# Patient Record
Sex: FEMALE | Race: BLACK OR AFRICAN AMERICAN | NOT HISPANIC OR LATINO | Employment: FULL TIME | URBAN - METROPOLITAN AREA
[De-identification: names, ages, dates, MRNs, and addresses within clinical notes are randomized per-mention and may not be internally consistent; named-entity substitution may affect disease eponyms.]

---

## 2017-01-03 ENCOUNTER — OFFICE VISIT (OUTPATIENT)
Dept: DERMATOLOGY | Facility: CLINIC | Age: 42
End: 2017-01-03
Payer: COMMERCIAL

## 2017-01-03 VITALS — BODY MASS INDEX: 25.34 KG/M2 | HEIGHT: 63 IN | WEIGHT: 143 LBS

## 2017-01-03 DIAGNOSIS — L82.1 SEBORRHEIC KERATOSES: ICD-10-CM

## 2017-01-03 DIAGNOSIS — L98.9 DISEASE OF SKIN AND SUBCUTANEOUS TISSUE: Primary | ICD-10-CM

## 2017-01-03 DIAGNOSIS — L91.8 SKIN TAGS, MULTIPLE ACQUIRED: ICD-10-CM

## 2017-01-03 DIAGNOSIS — L82.1 DERMATOSIS PAPULOSA NIGRA: ICD-10-CM

## 2017-01-03 PROCEDURE — 11100 PR BIOPSY OF SKIN LESION: CPT | Mod: S$GLB,,, | Performed by: DERMATOLOGY

## 2017-01-03 PROCEDURE — 99999 PR PBB SHADOW E&M-EST. PATIENT-LVL III: CPT | Mod: PBBFAC,,, | Performed by: DERMATOLOGY

## 2017-01-03 PROCEDURE — 88312 SPECIAL STAINS GROUP 1: CPT | Mod: 26,,, | Performed by: PATHOLOGY

## 2017-01-03 PROCEDURE — 88305 TISSUE EXAM BY PATHOLOGIST: CPT | Performed by: PATHOLOGY

## 2017-01-03 PROCEDURE — 1159F MED LIST DOCD IN RCRD: CPT | Mod: S$GLB,,, | Performed by: DERMATOLOGY

## 2017-01-03 PROCEDURE — 99202 OFFICE O/P NEW SF 15 MIN: CPT | Mod: 25,S$GLB,, | Performed by: DERMATOLOGY

## 2017-01-03 NOTE — PROGRESS NOTES
Subjective:       Patient ID:  Preeti Garvey is a 41 y.o. female who presents for   Chief Complaint   Patient presents with    Rash     Left breast, 1 month, redness, no tx     HPI Comments: Initial visit  Noticed red spot on inferior aspect of left breast one month ago, has been changing in size  Non pruritic, non bleeding, no other similar lesion  No h/o skin cancer         Current Outpatient Prescriptions: , non e new  Also OTC B complex vitamin supplement  Occasional aleve for HA    norgestimate-ethinyl estradiol (ORTHO TRI-CYCLEN LO) 0.18/0.215/0.25 mg-25 mcg (28) tablet, Take 1 tablet by mouth once daily., Disp: , Rfl:     cetirizine (ZYRTEC) 10 mg Cap, Take 1 capsule by mouth once daily., Disp: 30 capsule, Rfl: 11        Rash  - Initial  Affected locations: Left breast.  Duration: 1 month  Signs / symptoms: redness  Severity: mild  Timing: constant  Aggravated by: nothing  Relieving factors/Treatments tried: nothing        Review of Systems   Constitutional: Negative for fever and chills.   Skin: Positive for rash, daily sunscreen use (in makeup) and activity-related sunscreen use. Negative for itching.   Hematologic/Lymphatic: Does not bruise/bleed easily.        Objective:    Physical Exam   Constitutional: She appears well-developed and well-nourished. No distress.   Neurological: She is alert and oriented to person, place, and time. She is not disoriented.   Psychiatric: She has a normal mood and affect.   Skin:   Areas Examined (abnormalities noted in diagram):   Head / Face Inspection Performed  Neck Inspection Performed  Chest / Axilla Inspection Performed  Abdomen Inspection Performed                   Diagram Legend     Erythematous scaling macule/papule c/w actinic keratosis       Vascular papule c/w angioma      Pigmented verrucoid papule/plaque c/w seborrheic keratosis      Yellow umbilicated papule c/w sebaceous hyperplasia      Irregularly shaped tan macule c/w lentigo     1-2 mm  smooth white papules consistent with Milia      Movable subcutaneous cyst with punctum c/w epidermal inclusion cyst      Subcutaneous movable cyst c/w pilar cyst      Firm pink to brown papule c/w dermatofibroma      Pedunculated fleshy papule(s) c/w skin tag(s)      Evenly pigmented macule c/w junctional nevus     Mildly variegated pigmented, slightly irregular-bordered macule c/w mildly atypical nevus      Flesh colored to evenly pigmented papule c/w intradermal nevus       Pink pearly papule/plaque c/w basal cell carcinoma      Erythematous hyperkeratotic cursted plaque c/w SCC      Surgical scar with no sign of skin cancer recurrence      Open and closed comedones      Inflammatory papules and pustules      Verrucoid papule consistent consistent with wart     Erythematous eczematous patches and plaques     Dystrophic onycholytic nail with subungual debris c/w onychomycosis     Umbilicated papule    Erythematous-base heme-crusted tan verrucoid plaque consistent with inflamed seborrheic keratosis     Erythematous Silvery Scaling Plaque c/w Psoriasis     See annotation          Assessment / Plan:      Pathology Orders:      Normal Orders This Visit    Tissue Specimen To Pathology, Dermatology     Questions:    Directional Terms:  Other(comment)    Clinical information:  Isolated well circumscribed oval pink purple plaque with peripheral scale, non pruritic, EAC vs FDR vs other    Specific Site:  left breast        Disease of skin and subcutaneous tissue  -     Tissue Specimen To Pathology, Dermatology  Punch biopsy procedure note:  Punch biopsy performed after verbal consent obtained. Area marked and prepped with alcohol. Approximately 1cc of 1% lidocaine with epinephrine injected. 3 mm disposable punch used to remove lesion. Hemostasis obtained and biopsy site closed with 1 - 2 Prolene sutures. Wound care instructions reviewed with patient and handout given.    Seborrheic keratoses, Dermatosis papulosa  nigra  These are benign inherited growths without a malignant potential. Reassurance given to patient. No treatment is necessary.       Skin tags, multiple acquired  Reassurance given to patient. No treatment is necessary.   Treatment of benign, asymptomatic lesions may be considered cosmetic.    Patient instructed in importance in daily sun protection of at least spf 30. Sun avoidance and topical protection discussed.   Recommend Elta MD (physician office) COTZ sensitive (available online) for daily use on face and neck.  Patient encouraged to wear hat for all outdoor exposure.   Also discussed sun protective clothing.           Return in about 10 days (around 1/13/2017).

## 2017-01-03 NOTE — MR AVS SNAPSHOT
[facility]  [address]  [place] [address]  Phone: [phone]                  Preeti Gavrey   1/3/2017 10:45 AM   Office Visit    Description:  Female : 1975   Provider:  Jessica London MD   Department:  [facility]           Reason for Visit     Rash           Diagnoses this Visit        Comments    Disease of skin and subcutaneous tissue    -  Primary     Seborrheic keratoses         Dermatosis papulosa nigra         Skin tags, multiple acquired                To Do List           Future Appointments        Provider Department Dept Phone    2017 1:00 PM Jessica London MD [facility] [phone]      Goals (5 Years of Data)     None      Follow-Up and Disposition     Return in about 10 days (around 2017).      [facility]     [facility] Care [facility] - 24/ Assistance  Registered nurses in the [facility] On Call Center provide clinical advisement, health education, appointment booking, and other advisory services.  Call for this free service at [phone].             Medications           Message regarding Medications     Verify the changes and/or additions to your medication regime listed below are the same as discussed with your clinician today.  If any of these changes or additions are incorrect, please notify your healthcare provider.        STOP taking these medications     fluticasone (FLONASE) 50 mcg/actuation nasal spray 1 spray by Each Nare route once daily.           Verify that the below list of medications is an accurate representation of the medications you are currently taking.  If none reported, the list may be blank. If incorrect, please contact your healthcare provider. Carry this list with you in case of emergency.           Current Medications     norgestimate-ethinyl estradiol (ORTHO TRI-CYCLEN LO) 0.18/0.215/0.25 mg-25 mcg (28) tablet Take 1 tablet by mouth once daily.    cetirizine (ZYRTEC) 10 mg  "Cap Take 1 capsule by mouth once daily.           Clinical Reference Information           Vital Signs - Last Recorded  Most recent update: 1/3/2017 10:45 AM by Angelic Cantrell MA    Ht Wt LMP BMI       5' 3" (1.6 m) 64.9 kg (143 lb) 12/19/2016 25.33 kg/m2       Allergies as of 1/3/2017     Bactrim [Sulfamethoxazole-trimethoprim]      Immunizations Administered on Date of Encounter - 1/3/2017     None      Orders Placed During Today's Visit      Normal Orders This Visit    Tissue Specimen To Pathology, Dermatology       Instructions    Punch Biopsy Wound Care    Your doctor has performed a punch biopsy today.  A band aid and antibiotic ointment has been placed over the site.  This should remain in place for 24 hours.  It is recommended that you keep the area dry for the first 24 hours.  After 24 hours, you may remove the band aid and wash the area with warm soap and water and apply Vaseline jelly.  Many patients prefer to use Neosporin or Bacitracin ointment.  This is acceptable; however know that you can develop an allergy to this medication even if you have used it safely for years.  It is important to keep the area moist.  Letting it dry out and get air slows healing time, will worsen the scar, and make it more difficult to remove the stitches if they were placed.  Band aid is optional after first 24 hours.      If you notice increasing redness, tenderness, pain, or yellow drainage at the biopsy or surgical site, please notify your doctor.  These are signs of an infection.    If your biopsy/surgical site is bleeding, apply firm pressure for 15 minutes straight.  Repeat for another 15 minutes, if it is still bleeding.   If the surgical site continues to bleed, then please contact your doctor.     [facility]  [facility]  [address] [place] [address]  Dept: [phone]                "

## 2017-01-03 NOTE — PATIENT INSTRUCTIONS
Punch Biopsy Wound Care    Your doctor has performed a punch biopsy today.  A band aid and antibiotic ointment has been placed over the site.  This should remain in place for 24 hours.  It is recommended that you keep the area dry for the first 24 hours.  After 24 hours, you may remove the band aid and wash the area with warm soap and water and apply Vaseline jelly.  Many patients prefer to use Neosporin or Bacitracin ointment.  This is acceptable; however know that you can develop an allergy to this medication even if you have used it safely for years.  It is important to keep the area moist.  Letting it dry out and get air slows healing time, will worsen the scar, and make it more difficult to remove the stitches if they were placed.  Band aid is optional after first 24 hours.      If you notice increasing redness, tenderness, pain, or yellow drainage at the biopsy or surgical site, please notify your doctor.  These are signs of an infection.    If your biopsy/surgical site is bleeding, apply firm pressure for 15 minutes straight.  Repeat for another 15 minutes, if it is still bleeding.   If the surgical site continues to bleed, then please contact your doctor.     [facility]  [facility]  [address] [place] [address]  Dept: [phone]

## 2017-01-12 ENCOUNTER — TELEPHONE (OUTPATIENT)
Dept: DERMATOLOGY | Facility: CLINIC | Age: 42
End: 2017-01-12

## 2017-01-12 NOTE — TELEPHONE ENCOUNTER
----- Message from Maritza Garcia sent at 1/12/2017  1:39 PM CST -----  Call [phone]  Asking to reschedule Tuesday 01/17 appt to 01/16 th ...

## 2017-01-13 ENCOUNTER — OFFICE VISIT (OUTPATIENT)
Dept: DERMATOLOGY | Facility: CLINIC | Age: 42
End: 2017-01-13
Payer: COMMERCIAL

## 2017-01-13 VITALS — BODY MASS INDEX: 25.34 KG/M2 | HEIGHT: 63 IN | WEIGHT: 143 LBS

## 2017-01-13 DIAGNOSIS — T50.905D DRUG REACTION, SUBSEQUENT ENCOUNTER: ICD-10-CM

## 2017-01-13 DIAGNOSIS — T50.905A FIXED DRUG REACTION: Primary | ICD-10-CM

## 2017-01-13 PROCEDURE — 99999 PR PBB SHADOW E&M-EST. PATIENT-LVL II: CPT | Mod: PBBFAC,,, | Performed by: DERMATOLOGY

## 2017-01-13 PROCEDURE — 99213 OFFICE O/P EST LOW 20 MIN: CPT | Mod: S$GLB,,, | Performed by: DERMATOLOGY

## 2017-01-13 PROCEDURE — 1159F MED LIST DOCD IN RCRD: CPT | Mod: S$GLB,,, | Performed by: DERMATOLOGY

## 2017-01-13 RX ORDER — TRIAMCINOLONE ACETONIDE 1 MG/G
OINTMENT TOPICAL
Qty: 15 G | Refills: 1 | Status: SHIPPED | OUTPATIENT
Start: 2017-01-13 | End: 2017-03-24

## 2017-01-13 NOTE — PROGRESS NOTES
Subjective:       Patient ID:  Preeti Garvey is a 42 y.o. female who presents for   Chief Complaint   Patient presents with    Follow-up     BX results     HPI Comments: Patient last seen 01/03/2017 with isolated lesion left breast  Biopsy obtained    FINAL PATHOLOGIC DIAGNOSIS  1. Skin, left breast, punch biopsy:  - FEATURES MOST SUGGESTIVE OF A FIXED DRUG ERUPTION.  MICROSCOPIC DESCRIPTION: The biopsy shows compact orthokeratosis with focal parakeratosis. There is mild  epidermal acanthosis with elongation of rete ridges, mild spongiosis with exocytosis of lymphocytes and focal  vacuolar interface alteration along the dermoepidermal junction and the follicular epithelium with rare associated  apoptotic keratinocytes. There is an underlying superficial perivascular and perifollicular dermal lymphohistiocytic  infiltrate with scattered neutrophils and a few interstitial eosinophils. PAS stain is negative for fungi. Appropriately  reactive controls were reviewed. Multiple levels were examined.  Diagnosed by: Evy Caban M.D.  (Electronically Signed: 2017-01-09 12:34:28)      Review of Systems   Constitutional: Negative for fever and chills.   Skin: Negative for itching.        Objective:    Physical Exam   Constitutional: She appears well-developed and well-nourished. No distress.   Neurological: She is alert and oriented to person, place, and time. She is not disoriented.   Psychiatric: She has a normal mood and affect.   Skin:   Areas Examined (abnormalities noted in diagram):   Chest / Axilla Inspection Performed                   Diagram Legend     Erythematous scaling macule/papule c/w actinic keratosis       Vascular papule c/w angioma      Pigmented verrucoid papule/plaque c/w seborrheic keratosis      Yellow umbilicated papule c/w sebaceous hyperplasia      Irregularly shaped tan macule c/w lentigo     1-2 mm smooth white papules consistent with Milia      Movable subcutaneous cyst with punctum  c/w epidermal inclusion cyst      Subcutaneous movable cyst c/w pilar cyst      Firm pink to brown papule c/w dermatofibroma      Pedunculated fleshy papule(s) c/w skin tag(s)      Evenly pigmented macule c/w junctional nevus     Mildly variegated pigmented, slightly irregular-bordered macule c/w mildly atypical nevus      Flesh colored to evenly pigmented papule c/w intradermal nevus       Pink pearly papule/plaque c/w basal cell carcinoma      Erythematous hyperkeratotic cursted plaque c/w SCC      Surgical scar with no sign of skin cancer recurrence      Open and closed comedones      Inflammatory papules and pustules      Verrucoid papule consistent consistent with wart     Erythematous eczematous patches and plaques     Dystrophic onycholytic nail with subungual debris c/w onychomycosis     Umbilicated papule    Erythematous-base heme-crusted tan verrucoid plaque consistent with inflamed seborrheic keratosis     Erythematous Silvery Scaling Plaque c/w Psoriasis     See annotation      Assessment / Plan:        Fixed drug reaction  -     triamcinolone acetonide 0.1% (KENALOG) 0.1 % ointment; AAA bid  Dispense: 15 g; Refill: 1    Drug reaction, subsequent encounter    suspect aleve  Discussed possible culprits (NSAIDs, antibiotics, pseudoephedrine, fluconazole)  Medication avoidance  Topical steroid to attempt to speed resolution; asymptomatic           Return if symptoms worsen or fail to improve.

## 2017-01-23 ENCOUNTER — NURSE TRIAGE (OUTPATIENT)
Dept: ADMINISTRATIVE | Facility: CLINIC | Age: 42
End: 2017-01-23

## 2017-01-23 NOTE — TELEPHONE ENCOUNTER
Reason for Disposition   Severe eye pain    Protocols used:  EYE PAIN-A-OH    Preeti is calling to report she has pain behind the right eye that is unrelieved by OTC pain meds.  States has a pressure in the right side of her face and when she bends over feels she will fall over.  Advised ER.

## 2017-01-24 ENCOUNTER — TELEPHONE (OUTPATIENT)
Dept: FAMILY MEDICINE | Facility: CLINIC | Age: 42
End: 2017-01-24

## 2017-01-24 NOTE — TELEPHONE ENCOUNTER
----- Message from Elaina Galloway sent at 1/23/2017 12:06 PM CST -----  Contact: self: [phone]  Patient has an appointment scheduled tomorrow morning but her side of head, eye is hurting really bad at this point. She states she needs to be seen today. Please call with availability.

## 2017-03-22 ENCOUNTER — TELEPHONE (OUTPATIENT)
Dept: FAMILY MEDICINE | Facility: CLINIC | Age: 42
End: 2017-03-22

## 2017-03-22 NOTE — TELEPHONE ENCOUNTER
----- Message from Verito Chris sent at 3/21/2017  4:46 PM CDT -----  Contact: self  Patient called regarding a copy of immunizations records. Please fax # [phone] and contact before faxing or if possible to email : [email or web address]. Please contact [phone] (home)

## 2017-03-24 ENCOUNTER — OFFICE VISIT (OUTPATIENT)
Dept: DERMATOLOGY | Facility: CLINIC | Age: 42
End: 2017-03-24
Payer: COMMERCIAL

## 2017-03-24 VITALS — WEIGHT: 143 LBS | HEIGHT: 63 IN | BODY MASS INDEX: 25.34 KG/M2

## 2017-03-24 DIAGNOSIS — L27.1 DRUG ERUPTION, FIXED: ICD-10-CM

## 2017-03-24 DIAGNOSIS — B35.1 ONYCHOMYCOSIS: Primary | ICD-10-CM

## 2017-03-24 DIAGNOSIS — L91.8 SKIN TAGS, MULTIPLE ACQUIRED: ICD-10-CM

## 2017-03-24 PROCEDURE — 1160F RVW MEDS BY RX/DR IN RCRD: CPT | Mod: S$GLB,,, | Performed by: DERMATOLOGY

## 2017-03-24 PROCEDURE — 99213 OFFICE O/P EST LOW 20 MIN: CPT | Mod: 25,S$GLB,, | Performed by: DERMATOLOGY

## 2017-03-24 PROCEDURE — 11200 RMVL SKIN TAGS UP TO&INC 15: CPT | Mod: S$GLB,,, | Performed by: DERMATOLOGY

## 2017-03-24 PROCEDURE — 99999 PR PBB SHADOW E&M-EST. PATIENT-LVL II: CPT | Mod: PBBFAC,,, | Performed by: DERMATOLOGY

## 2017-03-24 RX ORDER — EFINACONAZOLE 100 MG/ML
SOLUTION TOPICAL
Qty: 8 ML | Refills: 3 | Status: SHIPPED | OUTPATIENT
Start: 2017-03-24 | End: 2017-07-27

## 2017-03-24 NOTE — PROGRESS NOTES
Subjective:       Patient ID:  Preeti Garvey is a 42 y.o. female who presents for   Chief Complaint   Patient presents with    Rash     R breast, L inner thigh    Skin Tags     neck, r side    Spot     L foot big toe     HPI Comments: Patient last seen 01/03/17 and 01/13/2017 with lesion clinically and dermpath c/w fixed drug reaction  Attributed to aleve, stopped  Now recall that aleve D was taken and that recent mucinex D taken  New spot on R inner thigh, similar to previous    aslo c/o bothersome tag on R neck, ithcy, cathces in jewelry  Lastly, c/o changes to Left big toenail, lifts nail, tender    Rash  - Initial  Affected locations: right upper leg (L breast)  Duration: 4 weeks  Signs / symptoms: redness and itching  Severity: mild to moderate  Timing: constant  Aggravated by: nothing  Treatments tried: OTC Aveno cream.  Improvement on treatment: mild    Skin Tags  - Initial  Affected locations: neck  Duration: 30 years  Signs / symptoms: irritated  Severity: mild to moderate  Timing: constant  Exacerbated by: necklace around neck.  Relieving factors/Treatments tried: nothing    Spot  - Initial  Affected locations: left foot (Big toenail)  Duration: 6 months  Signs and Symptoms: discoloration.  Severity: mild to moderate  Timing: constant  Aggravated by: nothing  Relieving factors/Treatments tried: nothing        Review of Systems   Constitutional: Negative for fever and chills.   Skin: Positive for rash. Negative for itching.        Objective:    Physical Exam   Constitutional: She appears well-developed and well-nourished. No distress.   Neurological: She is alert and oriented to person, place, and time. She is not disoriented.   Psychiatric: She has a normal mood and affect.   Skin:   Areas Examined (abnormalities noted in diagram):   Chest / Axilla Inspection Performed  Genitals / Buttocks / Groin Inspection Performed  RUE Inspected  LUE Inspection Performed                       Diagram Legend      Erythematous scaling macule/papule c/w actinic keratosis       Vascular papule c/w angioma      Pigmented verrucoid papule/plaque c/w seborrheic keratosis      Yellow umbilicated papule c/w sebaceous hyperplasia      Irregularly shaped tan macule c/w lentigo     1-2 mm smooth white papules consistent with Milia      Movable subcutaneous cyst with punctum c/w epidermal inclusion cyst      Subcutaneous movable cyst c/w pilar cyst      Firm pink to brown papule c/w dermatofibroma      Pedunculated fleshy papule(s) c/w skin tag(s)      Evenly pigmented macule c/w junctional nevus     Mildly variegated pigmented, slightly irregular-bordered macule c/w mildly atypical nevus      Flesh colored to evenly pigmented papule c/w intradermal nevus       Pink pearly papule/plaque c/w basal cell carcinoma      Erythematous hyperkeratotic cursted plaque c/w SCC      Surgical scar with no sign of skin cancer recurrence      Open and closed comedones      Inflammatory papules and pustules      Verrucoid papule consistent consistent with wart     Erythematous eczematous patches and plaques     Dystrophic onycholytic nail with subungual debris c/w onychomycosis     Umbilicated papule    Erythematous-base heme-crusted tan verrucoid plaque consistent with inflamed seborrheic keratosis     Erythematous Silvery Scaling Plaque c/w Psoriasis     See annotation      Assessment / Plan:        Fixed drug reaction  Avoid D part of medications (pseudoephedrin)    Onychomycosis, left big toe, tender nail, lifted by subungual debirs  -     JUBLIA 10 % Vianney; Apply to affected nail(s) qd  Dispense: 8 mL; Refill: 3    Skin tags, multiple acquired  On lesion R neck catches in clothing and jewelry, requests tx  Verbal consent obtained. 1 lesions removed with scissor snip removal after anesthesia with 1% lidocaine with epinephrine. Hemostasis achieved with aluminum chloride and hyfrecation. No complications.           Return if symptoms worsen or fail  to improve.

## 2017-03-24 NOTE — MR AVS SNAPSHOT
[facility]  [address] [place] [address]  Phone: [phone]                  Preeti Garvey   3/24/2017 8:15 AM   Office Visit    Description:  Female : 1975   Provider:  Jessica London MD   Department:  [facility]           Reason for Visit     Rash     Skin Tags     Spot           Diagnoses this Visit        Comments    Onychomycosis    -  Primary     Skin tags, multiple acquired         Drug eruption, fixed                To Do List           Goals (5 Years of Data)     None      Follow-Up and Disposition     Return if symptoms worsen or fail to improve.    Follow-up and Disposition History       These Medications        Disp Refills Start End    JUBLIA 10 % Vianney 8 mL 3 3/24/2017     Apply to affected nail(s) qd    Pharmacy: [id number]  [place], [place] - [address] [facility] [address] Ph #: [phone]         [facility] On Call     [facility] On Call Nurse [facility] -  Assistance  Registered nurses in the [facility] On Call Center provide clinical advisement, health education, appointment booking, and other advisory services.  Call for this free service at [phone].             Medications           Message regarding Medications     Verify the changes and/or additions to your medication regime listed below are the same as discussed with your clinician today.  If any of these changes or additions are incorrect, please notify your healthcare provider.        START taking these NEW medications        Refills    JUBLIA 10 % Vianney 3    Sig: Apply to affected nail(s) qd    Class: Normal      STOP taking these medications     triamcinolone acetonide 0.1% (KENALOG) 0.1 % ointment AAA bid           Verify that the below list of medications is an accurate representation of the medications you are currently taking.  If none reported, the list may be blank. If incorrect, please contact your healthcare provider. Carry this list with  "you in case of emergency.           Current Medications     cetirizine (ZYRTEC) 10 mg Cap Take 1 capsule by mouth once daily.    JUBLIA 10 % Vianney Apply to affected nail(s) qd    norgestimate-ethinyl estradiol (ORTHO TRI-CYCLEN LO) 0.18/0.215/0.25 mg-25 mcg (28) tablet Take 1 tablet by mouth once daily.           Clinical Reference Information           Your Vitals Were     Height Weight BMI          5' 3" (1.6 m) 64.9 kg (143 lb) 25.33 kg/m2        Allergies as of 3/24/2017     Bactrim [Sulfamethoxazole-trimethoprim]      Immunizations Administered on Date of Encounter - 3/24/2017     None      Language Assistance Services     ATTENTION: Language assistance services are available, free of charge. Please call [phone].      ATENCIÓN: Si felicitas caicedo, tiene a zaldivar disposición servicios gratuitos de asistencia lingüística. Llame al [phone].     CHÚ Ý: N?u b?n nói Ti?ng Vi?t, có các d?ch v? h? tr? ngôn ng? mi?n phí dành cho b?n. G?i s? [phone].         [facility] complies with applicable Federal civil rights laws and does not discriminate on the basis of race, color, national origin, age, disability, or sex.        "

## 2017-05-01 ENCOUNTER — TELEPHONE (OUTPATIENT)
Dept: DERMATOLOGY | Facility: CLINIC | Age: 42
End: 2017-05-01

## 2017-05-01 NOTE — TELEPHONE ENCOUNTER
Spoke to pt. Informed we didn't have anything available today. Offered appt tomorrow. Pt states she will be out of town. Advised pt to call back if it hasn't cleared once she comes back. Verbalized understanding.

## 2017-05-01 NOTE — TELEPHONE ENCOUNTER
----- Message from Tonja Ivey sent at 5/1/2017 11:51 AM CDT -----  Contact: Patient  Patient called advising that she has a rash on her right foot that is rising up her leg.  I advised the first available appointment is in June with Dr. London.  Patient advised she wishes to be seen today.  Patient advised that she does not want to see a Family Medicine doctor as well.  Please call patient back at [phone] to discuss.  Thank you!

## 2017-05-05 ENCOUNTER — TELEPHONE (OUTPATIENT)
Dept: DERMATOLOGY | Facility: CLINIC | Age: 42
End: 2017-05-05

## 2017-05-05 NOTE — TELEPHONE ENCOUNTER
----- Message from Lissy Arenas sent at 5/5/2017  8:09 AM CDT -----  Contact: patient  Patient calling in regards to scheduling a same day appt today. She has a rash on her right foot that is spreading up leg. Please advise.   Call back .  Thanks!

## 2017-05-08 ENCOUNTER — OFFICE VISIT (OUTPATIENT)
Dept: DERMATOLOGY | Facility: CLINIC | Age: 42
End: 2017-05-08
Payer: COMMERCIAL

## 2017-05-08 VITALS — BODY MASS INDEX: 25.34 KG/M2 | WEIGHT: 143 LBS | HEIGHT: 63 IN

## 2017-05-08 DIAGNOSIS — L27.1 DRUG ERUPTION, FIXED: Primary | ICD-10-CM

## 2017-05-08 DIAGNOSIS — L98.9 DISEASE OF SKIN AND SUBCUTANEOUS TISSUE: ICD-10-CM

## 2017-05-08 PROCEDURE — 99214 OFFICE O/P EST MOD 30 MIN: CPT | Mod: S$GLB,,, | Performed by: DERMATOLOGY

## 2017-05-08 PROCEDURE — 99999 PR PBB SHADOW E&M-EST. PATIENT-LVL III: CPT | Mod: PBBFAC,,, | Performed by: DERMATOLOGY

## 2017-05-08 PROCEDURE — 1160F RVW MEDS BY RX/DR IN RCRD: CPT | Mod: S$GLB,,, | Performed by: DERMATOLOGY

## 2017-05-08 NOTE — PROGRESS NOTES
Subjective:       Patient ID:  Preeti Garvey is a 42 y.o. female who presents for   Chief Complaint   Patient presents with    Rash     R arm, R leg, abdomen    Spot     boht underarms    Follow-up     drug reaction inner thigh     HPI Comments: Patient last seen 3/2017 with fixed drug reaction, presumed to pseudoephedrine  New spots on axillae and under R breast    C/o new rash, since Easter, started on R dorsal foot, now spreading up leg and R inner arm, itchy      No episodic medication (NSAIDs, no decongestant, no OTC supplement except briefly protein powder, has since stopped taking)  Current Outpatient Prescriptions:     JUBLIA 10 % Vianney, Apply to affected nail(s) qd, Disp: 8 mL, Rfl: 3    norgestimate-ethinyl estradiol (ORTHO TRI-CYCLEN LO) 0.18/0.215/0.25 mg-25 mcg (28) tablet, Take 1 tablet by mouth once daily., Disp: , Rfl:     cetirizine (ZYRTEC) 10 mg Cap, Take 1 capsule by mouth once daily., Disp: 30 capsule, Rfl: 11- TAKES PRN for seasonal allergies, not in recent months      Rash  - Initial  Affected locations: abdomen, right lower leg and right arm  Duration: 1 month  Signs / symptoms: spreading and itching  Severity: mild to moderate  Timing: constant  Aggravated by: nothing  Treatments tried: OTC aveeno.  Improvement on treatment: no relief    Spot  - Initial  Affected locations: right axilla and left axilla  Duration: 2 weeks  Signs / symptoms: redness  Severity: mild  Timing: constant  Aggravated by: nothing  Relieving factors/Treatments tried: nothing        Review of Systems   Constitutional: Negative for fever, chills, weight loss, weight gain, fatigue, night sweats and malaise.   Skin: Positive for itching, rash and wears hat.   Hematologic/Lymphatic: Does not bruise/bleed easily.        Objective:    Physical Exam   Constitutional: She appears well-developed and well-nourished. No distress.   Neurological: She is alert and oriented to person, place, and time. She is not  disoriented.   Psychiatric: She has a normal mood and affect.   Skin:   Areas Examined (abnormalities noted in diagram):   Chest / Axilla Inspection Performed  Genitals / Buttocks / Groin Inspection Performed  RUE Inspected  LUE Inspection Performed                       Diagram Legend     Erythematous scaling macule/papule c/w actinic keratosis       Vascular papule c/w angioma      Pigmented verrucoid papule/plaque c/w seborrheic keratosis      Yellow umbilicated papule c/w sebaceous hyperplasia      Irregularly shaped tan macule c/w lentigo     1-2 mm smooth white papules consistent with Milia      Movable subcutaneous cyst with punctum c/w epidermal inclusion cyst      Subcutaneous movable cyst c/w pilar cyst      Firm pink to brown papule c/w dermatofibroma      Pedunculated fleshy papule(s) c/w skin tag(s)      Evenly pigmented macule c/w junctional nevus     Mildly variegated pigmented, slightly irregular-bordered macule c/w mildly atypical nevus      Flesh colored to evenly pigmented papule c/w intradermal nevus       Pink pearly papule/plaque c/w basal cell carcinoma      Erythematous hyperkeratotic cursted plaque c/w SCC      Surgical scar with no sign of skin cancer recurrence      Open and closed comedones      Inflammatory papules and pustules      Verrucoid papule consistent consistent with wart     Erythematous eczematous patches and plaques     Dystrophic onycholytic nail with subungual debris c/w onychomycosis     Umbilicated papule    Erythematous-base heme-crusted tan verrucoid plaque consistent with inflamed seborrheic keratosis     Erythematous Silvery Scaling Plaque c/w Psoriasis     See annotation                          Assessment / Plan:        Drug eruption, fixed  New lesions axillae, inframammary folds, R inguinal fold  Had suspected pseudoephedrine, now presists recur off med x months  OCP next likely culprit  Plan to see GYN this week, discuss non OCP options (copper IUD vs  other)    Disease of skin and subcutaneous tissue, LP like, abdomen, lower leg, forearm  Declines bx at this time  Will try TAC ointment BID  Hold new [employer] protein supplement    Patient instructed in importance in daily sun protection of at least spf 30. Sun avoidance and topical protection discussed.   Recommend Elta MD (physician office) COTZ sensitive (available online) for daily use on face and neck.  Patient encouraged to wear hat for all outdoor exposure.   Also discussed sun protective clothing.             No Follow-up on file.

## 2017-06-27 ENCOUNTER — TELEPHONE (OUTPATIENT)
Dept: DERMATOLOGY | Facility: CLINIC | Age: 42
End: 2017-06-27

## 2017-06-27 NOTE — TELEPHONE ENCOUNTER
----- Message from Wendi Segundo sent at 6/26/2017  2:50 PM CDT -----  Contact: self  Patient needs first available appointment due to rash between her leg spreading. Please call patient at [phone]. Thanks!

## 2017-06-27 NOTE — TELEPHONE ENCOUNTER
Called and offered patient apt for today patient denied stated she was leaving for out of town will call back Friday to see if there are any cancelations.

## 2017-07-03 ENCOUNTER — TELEPHONE (OUTPATIENT)
Dept: DERMATOLOGY | Facility: CLINIC | Age: 42
End: 2017-07-03

## 2017-07-03 NOTE — TELEPHONE ENCOUNTER
----- Message from Pablo Miller sent at 7/3/2017  8:22 AM CDT -----  Contact: pt  Pt is calling to see if she can be seen before 9-1-17(rash on leg stomach and arm)  Call Back#[phone]  Thanks

## 2017-07-27 ENCOUNTER — OFFICE VISIT (OUTPATIENT)
Dept: DERMATOLOGY | Facility: CLINIC | Age: 42
End: 2017-07-27
Payer: COMMERCIAL

## 2017-07-27 VITALS — BODY MASS INDEX: 25.34 KG/M2 | WEIGHT: 143 LBS | HEIGHT: 63 IN

## 2017-07-27 DIAGNOSIS — D48.9 NEOPLASM OF UNCERTAIN BEHAVIOR: Primary | ICD-10-CM

## 2017-07-27 DIAGNOSIS — L98.9 DISEASE OF SKIN AND SUBCUTANEOUS TISSUE: ICD-10-CM

## 2017-07-27 PROCEDURE — 11100 PR BIOPSY OF SKIN LESION: CPT | Mod: S$GLB,,, | Performed by: DERMATOLOGY

## 2017-07-27 PROCEDURE — 88305 TISSUE EXAM BY PATHOLOGIST: CPT | Performed by: PATHOLOGY

## 2017-07-27 PROCEDURE — 99213 OFFICE O/P EST LOW 20 MIN: CPT | Mod: 25,S$GLB,, | Performed by: DERMATOLOGY

## 2017-07-27 PROCEDURE — 99999 PR PBB SHADOW E&M-EST. PATIENT-LVL III: CPT | Mod: PBBFAC,,, | Performed by: DERMATOLOGY

## 2017-07-27 RX ORDER — PREDNISONE 20 MG/1
TABLET ORAL
Qty: 25 TABLET | Refills: 0 | Status: SHIPPED | OUTPATIENT
Start: 2017-07-27 | End: 2017-09-01 | Stop reason: SDUPTHER

## 2017-07-27 NOTE — PROGRESS NOTES
"  Subjective:       Patient ID:  Preeti Garvey is a 42 y.o. 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, culprit not identified,  Returns with new complaint of erutpion on R inner arm, R leg and abdomen, non itchy, "spreading"  attirbutes to eating nuts  Also co/o changes to eyelids, attributes to eyeliner      Spot  - Initial  Affected locations: abdomen, right arm, right upper leg and left upper leg  Duration: 3 months  Signs / symptoms: spreading and redness (bumpy)  Severity: mild to moderate  Timing: constant  Aggravated by: nothing  Relieving factors/Treatments tried: nothing    Rash  - Initial  Affected locations: face  Duration: 2 months  Signs / symptoms: redness  Severity: mild  Timing: constant  Aggravated by: nothing  Treatments tried: elidel, triamcinolone ointment.      Current Outpatient Prescriptions:     norgestimate-ethinyl estradiol (ORTHO TRI-CYCLEN LO) 0.18/0.215/0.25 mg-25 mcg (28) tablet, Take 1 tablet by mouth once daily., Disp: , Rfl:     cetirizine (ZYRTEC) 10 mg Cap, Take 1 capsule by mouth once daily., Disp: 30 capsule, Rfl: 11          Spot     Rash         Review of Systems   Skin: Positive for rash.        Objective:    Physical Exam   Constitutional: She appears well-developed and well-nourished. No distress.   Neurological: She is alert and oriented to person, place, and time. She is not disoriented.   Psychiatric: She has a normal mood and affect.   Skin:   Areas Examined (abnormalities noted in diagram):   Head / Face Inspection Performed  Chest / Axilla Inspection Performed  Abdomen Inspection Performed  RUE Inspected  LUE Inspection Performed  RLE Inspected  LLE Inspection Performed                   Diagram Legend     Erythematous scaling macule/papule c/w actinic keratosis       Vascular papule c/w angioma      Pigmented verrucoid papule/plaque " c/w seborrheic keratosis      Yellow umbilicated papule c/w sebaceous hyperplasia      Irregularly shaped tan macule c/w lentigo     1-2 mm smooth white papules consistent with Milia      Movable subcutaneous cyst with punctum c/w epidermal inclusion cyst      Subcutaneous movable cyst c/w pilar cyst      Firm pink to brown papule c/w dermatofibroma      Pedunculated fleshy papule(s) c/w skin tag(s)      Evenly pigmented macule c/w junctional nevus     Mildly variegated pigmented, slightly irregular-bordered macule c/w mildly atypical nevus      Flesh colored to evenly pigmented papule c/w intradermal nevus       Pink pearly papule/plaque c/w basal cell carcinoma      Erythematous hyperkeratotic cursted plaque c/w SCC      Surgical scar with no sign of skin cancer recurrence      Open and closed comedones      Inflammatory papules and pustules      Verrucoid papule consistent consistent with wart     Erythematous eczematous patches and plaques     Dystrophic onycholytic nail with subungual debris c/w onychomycosis     Umbilicated papule    Erythematous-base heme-crusted tan verrucoid plaque consistent with inflamed seborrheic keratosis     Erythematous Silvery Scaling Plaque c/w Psoriasis     See annotation                  Assessment / Plan:      Pathology Orders:      Normal Orders This Visit    Tissue Specimen To Pathology, Dermatology     Questions:    Directional Terms:  Other(comment)    Clinical information:  Scattered lichenoid papules, some scaling, R dorsal foot and ankle, R inner forearm, abdomen, lichen planus vs lichenoid drug vs spong    Specific Site:  R dorsal foot        -     Tissue Specimen To Pathology, Dermatology    Disease of skin and subcutaneous tissue  -     predniSONE (DELTASONE) 20 MG tablet; 2 tabs PO QAM x 7 days, then 1 tab PO QAM x 7 days then 1/2 tab PO QAM x 4 days  Dispense: 25 tablet; Refill: 0  Favor LP, although not pruritic and more scaly     Patient will avoid nuts as she  feels this is a trigger         No Follow-up on file.   FINAL PATHOLOGIC DIAGNOSIS  1. Skin, right dorsal foot, shave biopsy:  - SPONGIOTIC AND LICHENOID DERMATITIS (SEE COMMENT).  MICROSCOPIC DESCRIPTION: The biopsy shows parakeratosis that is focally associated with neutrophils. The  epidermis is remarkable for prominent exocytosis of lymphocytes, mild spongiosis and vacuolar-interface changes  with dyskeratotic cells located at various levels throughout the epidermis. There is a superficial lichenoid and  perivascular lymphohistiocytic infiltrate with scattered eosinophils.  COMMENT: These histologic features are most suggestive of a lichenoid drug eruption or a photo drug eruption.  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 Clinical correlation is advised.  Diagnosed by: Evy Caban M.D.  (Electronically Signed: 2017-08-01 18:06:31)

## 2017-07-27 NOTE — PROGRESS NOTES
Subjective:       Patient ID:  Preeti Garvey is a 42 y.o. female who presents for   Chief Complaint   Patient presents with    Spot     R arm, abdomen, R leg    Rash     face     Spot  - Initial  Affected locations: abdomen, right arm, right upper leg and left upper leg  Duration: 3 months  Signs / symptoms: spreading and redness (bumpy)  Severity: mild to moderate  Timing: constant  Aggravated by: nothing  Relieving factors/Treatments tried: nothing    Rash  - Initial  Affected locations: face  Duration: 2 months  Signs / symptoms: redness  Severity: mild  Timing: constant  Aggravated by: nothing  Treatments tried: elidel, triamcinolone ointment.        Review of Systems   Constitutional: Negative for fever, chills, weight loss and night sweats.   Skin: Positive for itching and rash.   Hematologic/Lymphatic: Does not bruise/bleed easily.        Objective:    Physical Exam       Diagram Legend     Erythematous scaling macule/papule c/w actinic keratosis       Vascular papule c/w angioma      Pigmented verrucoid papule/plaque c/w seborrheic keratosis      Yellow umbilicated papule c/w sebaceous hyperplasia      Irregularly shaped tan macule c/w lentigo     1-2 mm smooth white papules consistent with Milia      Movable subcutaneous cyst with punctum c/w epidermal inclusion cyst      Subcutaneous movable cyst c/w pilar cyst      Firm pink to brown papule c/w dermatofibroma      Pedunculated fleshy papule(s) c/w skin tag(s)      Evenly pigmented macule c/w junctional nevus     Mildly variegated pigmented, slightly irregular-bordered macule c/w mildly atypical nevus      Flesh colored to evenly pigmented papule c/w intradermal nevus       Pink pearly papule/plaque c/w basal cell carcinoma      Erythematous hyperkeratotic cursted plaque c/w SCC      Surgical scar with no sign of skin cancer recurrence      Open and closed comedones      Inflammatory papules and pustules      Verrucoid papule consistent consistent  with wart     Erythematous eczematous patches and plaques     Dystrophic onycholytic nail with subungual debris c/w onychomycosis     Umbilicated papule    Erythematous-base heme-crusted tan verrucoid plaque consistent with inflamed seborrheic keratosis     Erythematous Silvery Scaling Plaque c/w Psoriasis     See annotation      Assessment / Plan:        There are no diagnoses linked to this encounter.         No Follow-up on file.

## 2017-07-31 NOTE — PATIENT INSTRUCTIONS
Shave Biopsy Wound Care    Your doctor has performed a shave biopsy today.  A band aid and vaseline ointment has been placed over the site.  This should remain in place for 24 hours.  It is recommended that you keep the area dry for the first 24 hours.  After 24 hours, you may remove the band aid and wash the area with warm soap and water and apply Vaseline jelly.  Many patients prefer to use Neosporin or Bacitracin ointment.  This is acceptable; however, know that you can develop an allergy to this medication even if you have used it safely for years.  It is important to keep the area moist.  Letting it dry out and get air slows healing time, and will worsen the scar.  Band aid is optional after first 24 hours.      If you notice increasing redness, tenderness, pain, or yellow drainage at the biopsy site, please notify your doctor.  These are signs of an infection.    If your biopsy site is bleeding, apply firm pressure for 15 minutes straight.  Repeat for another 15 minutes, if it is still bleeding.   If the surgical site continues to bleed, then please contact your doctor.       [facility]  [facility]  [address] [place] [address]  Dept: [phone]

## 2017-08-01 ENCOUNTER — TELEPHONE (OUTPATIENT)
Dept: DERMATOLOGY | Facility: CLINIC | Age: 42
End: 2017-08-01

## 2017-08-01 NOTE — TELEPHONE ENCOUNTER
Spoke with patient advised results not in yet, will contact when it is resulted. Patient verbally understood

## 2017-08-01 NOTE — TELEPHONE ENCOUNTER
----- Message from Corin Vu sent at 8/1/2017  9:17 AM CDT -----  Contact: self  Patient [phone] is calling for her biopsy results from last week/please call

## 2017-08-15 ENCOUNTER — OFFICE VISIT (OUTPATIENT)
Dept: DERMATOLOGY | Facility: CLINIC | Age: 42
End: 2017-08-15
Payer: COMMERCIAL

## 2017-08-15 VITALS — BODY MASS INDEX: 25.34 KG/M2 | HEIGHT: 63 IN | WEIGHT: 143 LBS

## 2017-08-15 DIAGNOSIS — L43.2 LICHENOID DRUG REACTION: Primary | ICD-10-CM

## 2017-08-15 PROCEDURE — 99999 PR PBB SHADOW E&M-EST. PATIENT-LVL III: CPT | Mod: PBBFAC,,, | Performed by: DERMATOLOGY

## 2017-08-15 PROCEDURE — 99213 OFFICE O/P EST LOW 20 MIN: CPT | Mod: S$GLB,,, | Performed by: DERMATOLOGY

## 2017-08-15 PROCEDURE — 3008F BODY MASS INDEX DOCD: CPT | Mod: S$GLB,,, | Performed by: DERMATOLOGY

## 2017-08-15 NOTE — PROGRESS NOTES
Subjective:       Patient ID:  Preeti Garvey is a 42 y.o. 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 Skin, right dorsal foot, shave biopsy:  - SPONGIOTIC AND LICHENOID DERMATITIS (SEE COMMENT).  MICROSCOPIC DESCRIPTION: The biopsy shows parakeratosis that is focally associated with neutrophils. The  epidermis is remarkable for prominent exocytosis of lymphocytes, mild spongiosis and vacuolar-interface changes  with dyskeratotic cells located at various levels throughout the epidermis. There is a superficial lichenoid and  perivascular lymphohistiocytic infiltrate with scattered eosinophils.  COMMENT: These histologic features are most suggestive of a lichenoid drug eruption or a photo drug eruption.  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 Clinical correlation is advised.  Diagnosed by: Evy Caban M.D.  (Electronically Signed: 2017-08-01 18:06:31)      Current Outpatient Prescriptions:     norgestimate-ethinyl estradiol (ORTHO TRI-CYCLEN LO) 0.18/0.215/0.25 mg-25 mcg (28) tablet, Take 1 tablet by mouth once daily., Disp: , Rfl:     predniSONE (DELTASONE) 20 MG tablet, 2 tabs PO QAM x 7 days, then 1 tab PO QAM x 7 days then 1/2 tab PO QAM x 4 days, Disp: 25 tablet, Rfl: 0    cetirizine (ZYRTEC) 10 mg Cap, Take 1 capsule by mouth once daily., Disp: 30 capsule, Rfl: 11          Review of Systems   Constitutional: Negative for fever, chills, weight loss and night sweats.   Skin: Positive for itching (lower R leg,  occasionally), rash and dry skin.   Hematologic/Lymphatic: Does not bruise/bleed easily.        Objective:    Physical Exam   Constitutional: She appears well-developed and well-nourished. No distress.   Neurological: She is alert and oriented to person, place, and time. She is not disoriented.   Psychiatric: She has a normal mood and affect.   Skin:   Areas Examined (abnormalities noted in diagram):   Head / Face Inspection Performed  Chest / Axilla Inspection Performed  Abdomen Inspection Performed  RUE Inspected  LUE Inspection Performed  RLE Inspected  LLE Inspection Performed                   Diagram Legend     Erythematous scaling macule/papule c/w actinic keratosis       Vascular papule c/w angioma      Pigmented verrucoid papule/plaque c/w seborrheic keratosis      Yellow umbilicated papule c/w sebaceous hyperplasia      Irregularly shaped tan macule c/w lentigo     1-2 mm smooth white papules consistent with Milia      Movable subcutaneous cyst with punctum c/w epidermal inclusion cyst      Subcutaneous movable cyst c/w pilar cyst      Firm pink to brown papule c/w dermatofibroma      Pedunculated fleshy papule(s) c/w skin tag(s)      Evenly pigmented macule c/w junctional nevus     Mildly variegated pigmented, slightly irregular-bordered macule c/w mildly atypical nevus      Flesh colored to evenly pigmented papule c/w intradermal nevus       Pink pearly papule/plaque c/w basal cell carcinoma      Erythematous hyperkeratotic cursted plaque c/w SCC      Surgical scar with no sign of skin cancer recurrence      Open and closed comedones      Inflammatory papules and pustules      Verrucoid papule consistent consistent with wart     Erythematous eczematous patches and plaques     Dystrophic onycholytic nail with subungual debris c/w onychomycosis     Umbilicated papule    Erythematous-base heme-crusted tan verrucoid plaque consistent with inflamed seborrheic keratosis     Erythematous Silvery Scaling Plaque  c/w Psoriasis     See annotation      Assessment / Plan:        Lichenoid drug reaction, h/o FDR  only PO med is OCP, needed for menorrhagia  Improved on PO prednisone, completing taper now  May continue topical steroids  Discuss alternative contraception method/ menorrhagia management (tranexamic acid?)           Return if symptoms worsen or fail to improve.

## 2017-08-29 DIAGNOSIS — L43.2 LICHENOID DRUG REACTION: Primary | ICD-10-CM

## 2017-08-29 RX ORDER — TRIAMCINOLONE ACETONIDE 1 MG/G
OINTMENT TOPICAL 2 TIMES DAILY
Qty: 15 G | Refills: 2 | Status: SHIPPED | OUTPATIENT
Start: 2017-08-29 | End: 2017-09-07 | Stop reason: SDUPTHER

## 2017-08-29 NOTE — TELEPHONE ENCOUNTER
Seen 8/15/2017  Requesting refill on TAC    Lichenoid drug reaction, h/o FDR  only PO med is OCP, needed for menorrhagia  Improved on PO prednisone, completing taper now  May continue topical steroids  Discuss alternative contraception method/ menorrhagia management (tranexamic acid?)

## 2017-09-01 ENCOUNTER — TELEPHONE (OUTPATIENT)
Dept: DERMATOLOGY | Facility: CLINIC | Age: 42
End: 2017-09-01

## 2017-09-01 DIAGNOSIS — L43.2 LICHENOID DRUG REACTION: Primary | ICD-10-CM

## 2017-09-01 DIAGNOSIS — L98.9 DISEASE OF SKIN AND SUBCUTANEOUS TISSUE: ICD-10-CM

## 2017-09-01 RX ORDER — PREDNISONE 20 MG/1
TABLET ORAL
Qty: 25 TABLET | Refills: 0 | Status: SHIPPED | OUTPATIENT
Start: 2017-09-01 | End: 2017-09-22

## 2017-09-01 NOTE — TELEPHONE ENCOUNTER
Patient now off OCP x few days  falred badly upon steroid taper discontinuation  Will allow additional PO steroids  If linked to OCP, should improve with time away from last dose. Lichen planus and pityriasis lichenoides also in clinical and histopath ddx  Other therapeutic consideration include plaquenil, metronidazole, tetracyclines

## 2017-09-01 NOTE — TELEPHONE ENCOUNTER
discussed need to take daily Vit D3 (1000IU) and calcium (1g)  Patient with no h/o gastric ulcers or GERD

## 2017-09-01 NOTE — TELEPHONE ENCOUNTER
Last seen 8/15/17  Biopsy supports SPONGIOTIC AND LICHENOID DERMATITIS   Currently spot treating with TAC  Pt requesting another round of pred. Please advise...

## 2017-09-01 NOTE — TELEPHONE ENCOUNTER
----- Message from Pepper Ratliff sent at 9/1/2017 10:29 AM CDT -----  Contact: patient  Patient called wanted to know if she can get back on the oral steroid that she had taken in the past?please call back at [phone] to advise. Thanks,

## 2017-09-07 DIAGNOSIS — L43.2 LICHENOID DRUG REACTION: ICD-10-CM

## 2017-09-07 RX ORDER — TRIAMCINOLONE ACETONIDE 1 MG/G
OINTMENT TOPICAL
Qty: 15 G | Refills: 0 | Status: SHIPPED | OUTPATIENT
Start: 2017-09-07 | End: 2019-01-30

## 2017-09-22 ENCOUNTER — OFFICE VISIT (OUTPATIENT)
Dept: DERMATOLOGY | Facility: CLINIC | Age: 42
End: 2017-09-22
Payer: COMMERCIAL

## 2017-09-22 VITALS — WEIGHT: 143 LBS | BODY MASS INDEX: 25.34 KG/M2 | HEIGHT: 63 IN

## 2017-09-22 DIAGNOSIS — L98.9 DISEASE OF SKIN AND SUBCUTANEOUS TISSUE: Primary | ICD-10-CM

## 2017-09-22 PROCEDURE — 88305 TISSUE EXAM BY PATHOLOGIST: CPT | Performed by: PATHOLOGY

## 2017-09-22 PROCEDURE — 11100 PR BIOPSY OF SKIN LESION: CPT | Mod: S$GLB,,, | Performed by: DERMATOLOGY

## 2017-09-22 PROCEDURE — 99999 PR PBB SHADOW E&M-EST. PATIENT-LVL III: CPT | Mod: PBBFAC,,, | Performed by: DERMATOLOGY

## 2017-09-22 PROCEDURE — 3008F BODY MASS INDEX DOCD: CPT | Mod: S$GLB,,, | Performed by: DERMATOLOGY

## 2017-09-22 PROCEDURE — 99213 OFFICE O/P EST LOW 20 MIN: CPT | Mod: 25,S$GLB,, | Performed by: DERMATOLOGY

## 2017-09-22 NOTE — PROGRESS NOTES
Subjective:       Patient ID:  Preeti Garvey is a 42 y.o. female who presents for   Chief Complaint   Patient presents with    Spot     L lower leg    Lesion     R arm     Follow-up     rash under breast     Patient last sen 8/2017 with lichenoid drug eruption, cleared with PO steroids and discontinuation of OCP  H/o FDR  One month ago, shaving accident on left calf, pink scaly mildly itchy, steroid cream did not help      Spot  - Initial  Affected locations: left lower leg  Signs / symptoms: dryness, redness and non-healing  Severity: mild  Timing: constant  Aggravated by: nothing    Lesion  - Initial  Affected locations: right arm  Signs / symptoms: dryness and redness  Severity: mild  Timing: constant  Aggravated by: nothing  Relieving factors/Treatments tried: nothing        Review of Systems   Constitutional: Negative for fever, chills, weight loss, weight gain and malaise.   Skin: Positive for rash and dry skin. Negative for itching.   Hematologic/Lymphatic: Does not bruise/bleed easily.        Objective:    Physical Exam   Constitutional: She appears well-developed and well-nourished. No distress.   Neurological: She is alert and oriented to person, place, and time. She is not disoriented.   Psychiatric: She has a normal mood and affect.   Skin:   Areas Examined (abnormalities noted in diagram):   Scalp / Hair Palpated and Inspected  Head / Face Inspection Performed  Neck Inspection Performed  Chest / Axilla Inspection Performed  Abdomen Inspection Performed  Genitals / Buttocks / Groin Inspection Performed  Back Inspection Performed  RUE Inspected  LUE Inspection Performed  RLE Inspected  LLE Inspection Performed  Nails and Digits Inspection Performed              Diagram Legend     Erythematous scaling macule/papule c/w actinic keratosis       Vascular papule c/w angioma      Pigmented verrucoid papule/plaque c/w seborrheic keratosis      Yellow umbilicated papule c/w sebaceous hyperplasia       Irregularly shaped tan macule c/w lentigo     1-2 mm smooth white papules consistent with Milia      Movable subcutaneous cyst with punctum c/w epidermal inclusion cyst      Subcutaneous movable cyst c/w pilar cyst      Firm pink to brown papule c/w dermatofibroma      Pedunculated fleshy papule(s) c/w skin tag(s)      Evenly pigmented macule c/w junctional nevus     Mildly variegated pigmented, slightly irregular-bordered macule c/w mildly atypical nevus      Flesh colored to evenly pigmented papule c/w intradermal nevus       Pink pearly papule/plaque c/w basal cell carcinoma      Erythematous hyperkeratotic cursted plaque c/w SCC      Surgical scar with no sign of skin cancer recurrence      Open and closed comedones      Inflammatory papules and pustules      Verrucoid papule consistent consistent with wart     Erythematous eczematous patches and plaques     Dystrophic onycholytic nail with subungual debris c/w onychomycosis     Umbilicated papule    Erythematous-base heme-crusted tan verrucoid plaque consistent with inflamed seborrheic keratosis     Erythematous Silvery Scaling Plaque c/w Psoriasis     See annotation                  Assessment / Plan:      Pathology Orders:      Normal Orders This Visit    Tissue Specimen To Pathology, Dermatology     Questions:    Directional Terms:  Other(comment)    Clinical information:  Scattered circular and oval erythematous indurated plaques with superficial scaling, biopsied in the past as FDR- patient on no PO med or supplement; now more psoriasiform and arcuate, koebnerize to areas of trauma, psoriasis vs Sweet vs other    Specific Site:  R upper arm        Disease of skin and subcutaneous tissue  -     Tissue Specimen To Pathology, Dermatology  -     CBC auto differential; Future  -     Comprehensive metabolic panel; Future  -     C-reactive protein; Future  -     Sedimentation rate, manual; Future    Punch biopsy procedure note:  Punch biopsy performed after  verbal consent obtained. Area marked and prepped with alcohol. Approximately 1cc of 1% lidocaine with epinephrine injected. 3 mm disposable punch used to remove lesion. Hemostasis obtained and biopsy site closed with 1 - 2 Prolene sutures. Wound care instructions reviewed with patient and handout given.    Plan to discuss at upcoming derm grand rounds 9/27/17 at [facility]    Recent lichenoid drug vs LP on R foot lower leg abdomen not fully resolved post DC OCP  Not responsive to PO and topical steroids  No fever, malaise, arthralgia    UTD with age appropriate cancer screenings    KOH negative           Return in about 1 week (around 9/29/2017).

## 2017-09-27 ENCOUNTER — OFFICE VISIT (OUTPATIENT)
Dept: DERMATOLOGY | Facility: CLINIC | Age: 42
End: 2017-09-27
Payer: COMMERCIAL

## 2017-09-27 DIAGNOSIS — L40.9 PSORIASIS: Primary | ICD-10-CM

## 2017-09-27 PROCEDURE — 99214 OFFICE O/P EST MOD 30 MIN: CPT | Mod: S$GLB,,, | Performed by: DERMATOLOGY

## 2017-09-27 PROCEDURE — 99999 PR PBB SHADOW E&M-EST. PATIENT-LVL II: CPT | Mod: PBBFAC,,, | Performed by: DERMATOLOGY

## 2017-09-27 PROCEDURE — 3008F BODY MASS INDEX DOCD: CPT | Mod: S$GLB,,, | Performed by: DERMATOLOGY

## 2017-09-27 RX ORDER — CALCIPOTRIENE, BETAMETHASONE DIPROPIONATE 50; .643 UG/G; MG/G
OINTMENT TOPICAL
Qty: 60 G | Refills: 1 | Status: SHIPPED | OUTPATIENT
Start: 2017-09-27 | End: 2017-12-27 | Stop reason: SDUPTHER

## 2017-09-27 NOTE — PATIENT INSTRUCTIONS
STARTING METHOTREXATE  What you need to know    Methotrexate (MTX) is a potent medication that can be very effective in treating dermatological diseases that does not respond to topical medications or light therapy. Your skin will usually improve in approximately 6-8 weeks with the use of this medication. You should NOT see an immediate improvement with MTX, and if this occurs, you should contact your physician. Like most medications, MTX has risks and side effects associated with it. By following the guidelines, we hope to improve your psoriasis and minimize your risks associated with MTX therapy.    1. MTX is given WEEKLY not daily. The weekly dose is either a single or divided dose and should be taken on the same day of the week. If you discover that you are taking your dose incorrectly, you should notify your physician immediately so that antidote therapy for overdose can be started as soon as possible.  2. You must not become pregnant while on this medication and for at least 12 weeks afterward. This includes men or women trying to conceive a child as MTX is known to cause birth defects and may cause miscarriage or stillbirth.  3. You should avoid alcoholic beverages while on MTX.  4. Some medications can increase side effects of MTX or decrease its potency. Be sure to tell all other treating physicians that you are on MTX prior to their prescribing a new drug for you. If in doubt, call our office to inform the nurse PRIOR to starting any new medication to ensure that it is safe to take with MTX. Certain drugs such as aspirin, non-steroidal anti-inflammatory drugs, and antibiotics that contain trimethoprim (sulfa drugs) cannot be taken with MTX.  5. You MUST see the doctor approximately every 4-6 weeks for follow-up examination and lab work to continue receiving your MTX.  6. Should you develop any infection which requires treatment with antibiotics, please notify our office for instructions regarding holding  your MTX until your infection is clear.  7. Most common side effects of MTX therapy are loss of appetite, fatigue, nausea, mouth sores, and abnormal liver function tests. Please discuss any complaints of nausea as splitting your MTX dose may help to minimize this. Periodic lab test are REQUIRED to monitor liver function and blood counts.  8. Folic acid is usually prescribed during MTX therapy to help prevent anemia. Take your folic acid EVERY DAY.  9. Long term therapy with MTX may require that you have a liver biopsy performed to ensure that the liver does not become scarred. Your physician will monitor your total dose of drug and discuss this with you should this be required. However, once your lesions clear, or goal is to taper you off of MTX (or use the smallest maintenance does possible) to avoid this if possible.  10. Avoid sunbathing or exposure to ultraviolet light white on MTX as you may be more sensitive to sunburn while on this drug.  11. Inform the nurse or physician of any changes in your health while on MTX.  12. Always inform any doctor who cares for you that you are on Methotrexate. Some antibiotics like Bactrim (trimethoprim-sulfamethoxazole) and Dapsone should not be taken while on MTX.    Please call our office @ [phone] for any questions, concerns, or problems regarding your medication.

## 2017-09-27 NOTE — PROGRESS NOTES
Subjective:       Patient ID:  Preeti Garvey is a 42 y.o. 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        Objective:    Physical Exam   Constitutional: She appears well-developed and well-nourished. No distress.   Neurological: She is alert and oriented to person, place, and time. She is not disoriented.   Psychiatric: She has a normal mood and affect.   Skin:   Areas Examined (abnormalities noted in diagram):   Scalp / Hair Palpated and Inspected  Head / Face Inspection Performed  Neck Inspection Performed  Chest / Axilla Inspection Performed  Abdomen Inspection Performed  Genitals / Buttocks / Groin Inspection Performed  Back Inspection Performed  RUE Inspected  LUE Inspection Performed  RLE Inspected  LLE Inspection Performed  Nails and Digits Inspection Performed              Diagram Legend     Erythematous scaling macule/papule c/w actinic keratosis       Vascular papule c/w angioma      Pigmented verrucoid papule/plaque c/w seborrheic keratosis      Yellow umbilicated papule c/w sebaceous hyperplasia      Irregularly shaped tan macule c/w lentigo     1-2 mm smooth white papules consistent with Milia      Movable subcutaneous cyst with punctum c/w epidermal inclusion cyst      Subcutaneous movable cyst c/w pilar cyst      Firm pink to brown papule c/w dermatofibroma      Pedunculated fleshy papule(s) c/w skin tag(s)      Evenly pigmented macule c/w junctional nevus     Mildly variegated pigmented, slightly irregular-bordered macule c/w mildly atypical nevus      Flesh  colored to evenly pigmented papule c/w intradermal nevus       Pink pearly papule/plaque c/w basal cell carcinoma      Erythematous hyperkeratotic cursted plaque c/w SCC      Surgical scar with no sign of skin cancer recurrence      Open and closed comedones      Inflammatory papules and pustules      Verrucoid papule consistent consistent with wart     Erythematous eczematous patches and plaques     Dystrophic onycholytic nail with subungual debris c/w onychomycosis     Umbilicated papule    Erythematous-base heme-crusted tan verrucoid plaque consistent with inflamed seborrheic keratosis     Erythematous Silvery Scaling Plaque c/w Psoriasis     See annotation      Assessment / Plan:        Psoriasis  approx 2% BSA, trunk extremities, inverse in axillae and inguinal folds  Discussed benefits and risks of MTX therapy including but not limited to oral ulcers, GI upset, fatigue, bone marrow suppression, pulmonary fibrosis, hepatotoxicity.  MTX brochure discussed and provided to patient.  Patient expresses understanding.    Check baseline CBC, CMP, Hep B, Hep C and Quantiferon gold.     If labs are WNL, start MTX at 10mg qwk taken in divided dose each week, Folic acid 1mg qd except on day taking MTX.    Will need CBC at 1 weeks after first dose then will need CBC and LFTs q 3 months and CMP q 6 months. Will need Quantiferon gold annually.    -     CBC auto differential; Future  -     Comprehensive metabolic panel; Future  -     Quantiferon Gold TB; Future  -     Hepatitis B surface antibody; Future  -     Hepatitis C antibody; Future  -     HEPATITIS B CORE ANTIBODY, TOTAL; Future    Addendum: FINAL PATHOLOGIC DIAGNOSIS  1. Skin, right upper arm, punch biopsy:  - PSORIASIFORM DERMATITIS (SEE COMMENT).  MICROSCOPIC DESCRIPTION: The biopsy shows confluent parakeratosis associated with neutrophils. The viable  epidermis exhibits hypogranulosis and acanthosis with regular elongation of the rete ridges with superimposed  mild  spongiosis. The epidermal suprapapillary plates appear thin and there are tortuous vessels in the dermal papillae.  Within the superficial dermis there is a fairly brisk superficial perivascular and focally band-like lymphohistiocytic  infiltrate with a few scattered neutrophils and a rare eosinophil.  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  Clinical correlation is advised.  Diagnosed by: Evy Caban M.D.  (Electronically Signed: 2017-09-27 09:35:03)         No Follow-up on file.

## 2017-09-29 ENCOUNTER — LAB VISIT (OUTPATIENT)
Dept: LAB | Facility: HOSPITAL | Age: 42
End: 2017-09-29
Attending: DERMATOLOGY
Payer: COMMERCIAL

## 2017-09-29 DIAGNOSIS — L40.9 PSORIASIS: ICD-10-CM

## 2017-09-29 DIAGNOSIS — L98.9 DISEASE OF SKIN AND SUBCUTANEOUS TISSUE: ICD-10-CM

## 2017-09-29 PROCEDURE — 36415 COLL VENOUS BLD VENIPUNCTURE: CPT | Mod: PO

## 2017-09-29 PROCEDURE — 86704 HEP B CORE ANTIBODY TOTAL: CPT

## 2017-09-29 PROCEDURE — 86803 HEPATITIS C AB TEST: CPT

## 2017-09-29 PROCEDURE — 85025 COMPLETE CBC W/AUTO DIFF WBC: CPT

## 2017-09-29 PROCEDURE — 80053 COMPREHEN METABOLIC PANEL: CPT

## 2017-09-29 PROCEDURE — 85651 RBC SED RATE NONAUTOMATED: CPT | Mod: PO

## 2017-09-29 PROCEDURE — 86706 HEP B SURFACE ANTIBODY: CPT

## 2017-10-02 ENCOUNTER — DOCUMENTATION ONLY (OUTPATIENT)
Dept: FAMILY MEDICINE | Facility: CLINIC | Age: 42
End: 2017-10-02

## 2017-10-02 LAB
ALBUMIN SERPL BCP-MCNC: 3.5 G/DL
ALP SERPL-CCNC: 100 U/L
ALT SERPL W/O P-5'-P-CCNC: 18 U/L
ANION GAP SERPL CALC-SCNC: 7 MMOL/L
AST SERPL-CCNC: 20 U/L
BASOPHILS # BLD AUTO: 0.05 K/UL
BASOPHILS NFR BLD: 1 %
BILIRUB SERPL-MCNC: 0.3 MG/DL
BUN SERPL-MCNC: 11 MG/DL
CALCIUM SERPL-MCNC: 9.4 MG/DL
CHLORIDE SERPL-SCNC: 104 MMOL/L
CO2 SERPL-SCNC: 28 MMOL/L
CREAT SERPL-MCNC: 0.8 MG/DL
DIFFERENTIAL METHOD: NORMAL
EOSINOPHIL # BLD AUTO: 0.3 K/UL
EOSINOPHIL NFR BLD: 5.6 %
ERYTHROCYTE [DISTWIDTH] IN BLOOD BY AUTOMATED COUNT: 12.5 %
ERYTHROCYTE [SEDIMENTATION RATE] IN BLOOD BY WESTERGREN METHOD: 15 MM/HR
EST. GFR  (AFRICAN AMERICAN): >60 ML/MIN/1.73 M^2
EST. GFR  (NON AFRICAN AMERICAN): >60 ML/MIN/1.73 M^2
GLUCOSE SERPL-MCNC: 96 MG/DL
HBV CORE AB SERPL QL IA: NEGATIVE
HBV SURFACE AB SER-ACNC: POSITIVE M[IU]/ML
HCT VFR BLD AUTO: 41.5 %
HCV AB SERPL QL IA: NEGATIVE
HGB BLD-MCNC: 13.6 G/DL
LYMPHOCYTES # BLD AUTO: 1.9 K/UL
LYMPHOCYTES NFR BLD: 37.3 %
MCH RBC QN AUTO: 28.9 PG
MCHC RBC AUTO-ENTMCNC: 32.8 G/DL
MCV RBC AUTO: 88 FL
MONOCYTES # BLD AUTO: 0.4 K/UL
MONOCYTES NFR BLD: 8.7 %
NEUTROPHILS # BLD AUTO: 2.3 K/UL
NEUTROPHILS NFR BLD: 47.2 %
PLATELET # BLD AUTO: 344 K/UL
PMV BLD AUTO: 9.3 FL
POTASSIUM SERPL-SCNC: 3.7 MMOL/L
PROT SERPL-MCNC: 7.4 G/DL
RBC # BLD AUTO: 4.71 M/UL
SODIUM SERPL-SCNC: 139 MMOL/L
WBC # BLD AUTO: 4.96 K/UL

## 2017-10-02 NOTE — PROGRESS NOTES
Health Maintenance Due   Topic Date Due    Lipid Panel  1974    TETANUS VACCINE  01/26/1992    Pap Smear with HPV Cotest  01/26/1995    Mammogram  01/26/2014    Influenza Vaccine  08/01/2017

## 2017-10-03 DIAGNOSIS — L40.9 PSORIASIS: Primary | ICD-10-CM

## 2017-10-03 DIAGNOSIS — L40.9 PSORIASIS: ICD-10-CM

## 2017-10-03 RX ORDER — FOLIC ACID 1 MG/1
TABLET ORAL
Qty: 30 TABLET | Refills: 5 | Status: SHIPPED | OUTPATIENT
Start: 2017-10-03 | End: 2017-10-03 | Stop reason: SDUPTHER

## 2017-10-03 RX ORDER — METHOTREXATE 2.5 MG/1
TABLET ORAL
Qty: 16 TABLET | Refills: 2 | Status: SHIPPED | OUTPATIENT
Start: 2017-10-03 | End: 2019-01-30

## 2017-10-03 NOTE — PROGRESS NOTES
CBC CMP hepatitis profile and quantiferon gold reviewed  Appears immunized against Hep B  No prior TB exposure  Okay to start MTX, medication sent to pharmacy  As discussed, weekly medication (can split 4 tabs between AM lunch and dinner), daily folic acid while on this medication  Test dose, then day prior to next dose, come to lab for CBC, wait for our call to take second dose.

## 2017-10-06 RX ORDER — FOLIC ACID 1 MG/1
TABLET ORAL
Qty: 90 TABLET | Refills: 5 | Status: SHIPPED | OUTPATIENT
Start: 2017-10-06 | End: 2019-01-30

## 2017-12-27 DIAGNOSIS — L40.9 PSORIASIS: ICD-10-CM

## 2017-12-27 NOTE — TELEPHONE ENCOUNTER
----- Message from Dahlia Mathews sent at 12/27/2017 11:33 AM CST -----  Contact: Preeti  Calling to get a refill on   Rx calcipotriene-betamethasone (TACLONEX) ointment    [facility] [id number]  [address] [address] [facility] [address] [place] [address]  Phone: [phone] Fax: [phone]

## 2017-12-29 RX ORDER — CALCIPOTRIENE, BETAMETHASONE DIPROPIONATE 50; .643 UG/G; MG/G
OINTMENT TOPICAL
Qty: 60 G | Refills: 1 | Status: SHIPPED | OUTPATIENT
Start: 2017-12-29 | End: 2019-01-30

## 2018-01-19 ENCOUNTER — OFFICE VISIT (OUTPATIENT)
Dept: FAMILY MEDICINE | Facility: CLINIC | Age: 43
End: 2018-01-19
Payer: COMMERCIAL

## 2018-01-19 ENCOUNTER — HOSPITAL ENCOUNTER (OUTPATIENT)
Dept: RADIOLOGY | Facility: CLINIC | Age: 43
Discharge: HOME | End: 2018-01-19
Attending: PHYSICIAN ASSISTANT
Payer: COMMERCIAL

## 2018-01-19 ENCOUNTER — HOSPITAL ENCOUNTER (EMERGENCY)
Facility: HOSPITAL | Age: 43
Discharge: HOME | End: 2018-01-19
Attending: EMERGENCY MEDICINE
Payer: COMMERCIAL

## 2018-01-19 VITALS
DIASTOLIC BLOOD PRESSURE: 54 MMHG | BODY MASS INDEX: 24.25 KG/M2 | HEART RATE: 81 BPM | HEIGHT: 63 IN | SYSTOLIC BLOOD PRESSURE: 84 MMHG | TEMPERATURE: 99 F | WEIGHT: 136.88 LBS

## 2018-01-19 VITALS
TEMPERATURE: 99 F | WEIGHT: 137 LBS | HEART RATE: 77 BPM | OXYGEN SATURATION: 98 % | DIASTOLIC BLOOD PRESSURE: 63 MMHG | HEIGHT: 62 IN | RESPIRATION RATE: 18 BRPM | SYSTOLIC BLOOD PRESSURE: 112 MMHG | BODY MASS INDEX: 25.21 KG/M2

## 2018-01-19 DIAGNOSIS — R05.9 COUGH: ICD-10-CM

## 2018-01-19 DIAGNOSIS — I95.9 HYPOTENSION, UNSPECIFIED HYPOTENSION TYPE: ICD-10-CM

## 2018-01-19 DIAGNOSIS — R68.89 FLU-LIKE SYMPTOMS: Primary | ICD-10-CM

## 2018-01-19 DIAGNOSIS — J11.1 INFLUENZA: Primary | ICD-10-CM

## 2018-01-19 LAB
ALBUMIN SERPL BCP-MCNC: 3.8 G/DL
ALP SERPL-CCNC: 101 U/L
ALT SERPL W/O P-5'-P-CCNC: 22 U/L
ANION GAP SERPL CALC-SCNC: 7 MMOL/L
AST SERPL-CCNC: 24 U/L
B-HCG UR QL: NEGATIVE
BASOPHILS # BLD AUTO: 0 K/UL
BASOPHILS NFR BLD: 0.2 %
BILIRUB SERPL-MCNC: 0.1 MG/DL
BUN SERPL-MCNC: 10 MG/DL
CALCIUM SERPL-MCNC: 9.3 MG/DL
CHLORIDE SERPL-SCNC: 103 MMOL/L
CO2 SERPL-SCNC: 24 MMOL/L
CREAT SERPL-MCNC: 0.8 MG/DL
CTP QC/QA: YES
DIFFERENTIAL METHOD: ABNORMAL
EOSINOPHIL # BLD AUTO: 0.1 K/UL
EOSINOPHIL NFR BLD: 2.8 %
ERYTHROCYTE [DISTWIDTH] IN BLOOD BY AUTOMATED COUNT: 11.9 %
EST. GFR  (AFRICAN AMERICAN): >60 ML/MIN/1.73 M^2
EST. GFR  (NON AFRICAN AMERICAN): >60 ML/MIN/1.73 M^2
GLUCOSE SERPL-MCNC: 100 MG/DL
HCT VFR BLD AUTO: 41.6 %
HGB BLD-MCNC: 14.1 G/DL
LYMPHOCYTES # BLD AUTO: 0.6 K/UL
LYMPHOCYTES NFR BLD: 13.6 %
MCH RBC QN AUTO: 29 PG
MCHC RBC AUTO-ENTMCNC: 34 G/DL
MCV RBC AUTO: 85 FL
MONOCYTES # BLD AUTO: 0.4 K/UL
MONOCYTES NFR BLD: 9.5 %
NEUTROPHILS # BLD AUTO: 3.3 K/UL
NEUTROPHILS NFR BLD: 73.9 %
PLATELET # BLD AUTO: 261 K/UL
PMV BLD AUTO: 7.7 FL
POTASSIUM SERPL-SCNC: 3.7 MMOL/L
PROT SERPL-MCNC: 7.8 G/DL
RBC # BLD AUTO: 4.88 M/UL
SODIUM SERPL-SCNC: 134 MMOL/L
TSH SERPL DL<=0.005 MIU/L-ACNC: 1.86 UIU/ML
WBC # BLD AUTO: 4.4 K/UL

## 2018-01-19 PROCEDURE — 71046 X-RAY EXAM CHEST 2 VIEWS: CPT | Mod: TC,FY,PO

## 2018-01-19 PROCEDURE — 99283 EMERGENCY DEPT VISIT LOW MDM: CPT

## 2018-01-19 PROCEDURE — 99214 OFFICE O/P EST MOD 30 MIN: CPT | Mod: S$GLB,,, | Performed by: PHYSICIAN ASSISTANT

## 2018-01-19 PROCEDURE — 36415 COLL VENOUS BLD VENIPUNCTURE: CPT

## 2018-01-19 PROCEDURE — 25000003 PHARM REV CODE 250: Performed by: EMERGENCY MEDICINE

## 2018-01-19 PROCEDURE — 80053 COMPREHEN METABOLIC PANEL: CPT

## 2018-01-19 PROCEDURE — 71046 X-RAY EXAM CHEST 2 VIEWS: CPT | Mod: 26,,, | Performed by: RADIOLOGY

## 2018-01-19 PROCEDURE — 84443 ASSAY THYROID STIM HORMONE: CPT

## 2018-01-19 PROCEDURE — 85025 COMPLETE CBC W/AUTO DIFF WBC: CPT

## 2018-01-19 PROCEDURE — 99999 PR PBB SHADOW E&M-EST. PATIENT-LVL IV: CPT | Mod: PBBFAC,,, | Performed by: PHYSICIAN ASSISTANT

## 2018-01-19 PROCEDURE — 81025 URINE PREGNANCY TEST: CPT | Performed by: EMERGENCY MEDICINE

## 2018-01-19 RX ORDER — ACETAMINOPHEN 500 MG
1000 TABLET ORAL
Status: COMPLETED | OUTPATIENT
Start: 2018-01-19 | End: 2018-01-19

## 2018-01-19 RX ORDER — OSELTAMIVIR PHOSPHATE 75 MG/1
75 CAPSULE ORAL 2 TIMES DAILY
Qty: 10 CAPSULE | Refills: 0 | Status: SHIPPED | OUTPATIENT
Start: 2018-01-19 | End: 2018-01-24

## 2018-01-19 RX ADMIN — ACETAMINOPHEN 1000 MG: 500 TABLET, FILM COATED ORAL at 10:01

## 2018-01-19 RX ADMIN — SODIUM CHLORIDE 1000 ML: 0.9 INJECTION, SOLUTION INTRAVENOUS at 10:01

## 2018-01-19 NOTE — ED NOTES
"Onset of flu like symptoms 2 or 3 days ago.  Sent by clinic for "low blood pressure".  Requesting labs for "feeling tired lately".  Unsure of reason for being tired.  Denies any nausea, vomiting, or diarrhea. Decreased appetite.  No SOB.  No chest pain.  Reports chills, body aches.  Family present during exam.    "

## 2018-01-19 NOTE — ED NOTES
Upon discharge, patient is AAOx4, no cardiac or respiratory complications. Follow up care and  Medications have been reviewed with patient and has been instructed to return to the ER if needed. Patient verbalized understanding and ambulated to the lobby without difficulty. [facility].

## 2018-01-19 NOTE — PROGRESS NOTES
Subjective:       Patient ID: Preeti Garvey is a 42 y.o. female.    Chief Complaint: Chest Pain; Nasal Congestion; Dizziness; Sinus Problem; Sore Throat; and Cough    Mrs. Garvey is a 42 year old female who presents to clinic for evaluation of cough. She complains of abrupt onset of fever (100.8), chills, cough, shortness of breath, ear fullness, body aches, fatigue, and lightheadedness. She states she typically eats a lot of food, but she has not been doing so due to her symptoms. She admits to mild sinus pressure and drainage and headache, which has now resolved. She states that her  and son were recently ill as well. She has tried OTC mucinex and theraflu with mild improvement.       Review of Systems   Constitutional: Positive for chills and fever. Negative for fatigue.   HENT: Positive for congestion, ear pain, rhinorrhea and sore throat. Negative for ear discharge, nosebleeds, postnasal drip, sinus pressure, sneezing, trouble swallowing and voice change.    Eyes: Negative.    Respiratory: Positive for cough and shortness of breath. Negative for chest tightness and wheezing.    Cardiovascular: Negative for palpitations and leg swelling.   Gastrointestinal: Negative for abdominal pain, constipation, diarrhea, nausea and vomiting.   Musculoskeletal: Positive for myalgias. Negative for arthralgias. Joint swelling: chest wall pain.   Allergic/Immunologic: Negative for environmental allergies and immunocompromised state.   Neurological: Positive for headaches. Negative for dizziness, syncope and light-headedness.   Hematological: Negative for adenopathy.       Objective:      Vitals:    01/19/18 0827   BP: (!) 84/54   Pulse:    Temp:      Physical Exam   Constitutional: She is oriented to person, place, and time. She appears well-developed and well-nourished. She appears ill.   HENT:   Head: Normocephalic and atraumatic.   Right Ear: Hearing, tympanic membrane, external ear and ear canal normal.    Left Ear: Hearing, tympanic membrane, external ear and ear canal normal.   Nose: Nose normal. Right sinus exhibits no maxillary sinus tenderness and no frontal sinus tenderness. Left sinus exhibits no maxillary sinus tenderness and no frontal sinus tenderness.   Mouth/Throat: Mucous membranes are normal. Posterior oropharyngeal erythema present.   Fluid behind bilateral TM.   Eyes: Conjunctivae, EOM and lids are normal. Pupils are equal, round, and reactive to light.   Cardiovascular: Normal rate, regular rhythm, normal heart sounds and intact distal pulses.    Pulmonary/Chest: Effort normal and breath sounds normal.   Lymphadenopathy:     She has no cervical adenopathy.   Neurological: She is alert and oriented to person, place, and time.   Skin: Skin is warm.       Assessment:       1. Influenza    2. Cough    3. Hypotension, unspecified hypotension type        Plan:       Influenza  -     oseltamivir (TAMIFLU) 75 MG capsule; Take 1 capsule (75 mg total) by mouth 2 (two) times daily.  Dispense: 10 capsule; Refill: 0    Cough  -     X-Ray Chest PA And Lateral; Future; Expected date: 01/19/2018. CXR is negative for pneumonia.     Hypotension, unspecified hypotension type        - Likely related to dehydration.     Return to clinic if symptoms worsen or do not improve with treatment    Addendum: Patient went to CXR and nearly had a near syncopal episode. Repeat BP in the clinic 84/54. Advised patient to go to ER for IV fluid hydration and further evaluation.

## 2018-01-19 NOTE — ED PROVIDER NOTES
Encounter Date: 1/19/2018    SCRIBE #1 NOTE: I, Luisa Rios, am scribing for, and in the presence of, Dr. Ramos.       History     Chief Complaint   Patient presents with    Influenza     fever, sore throat, cough & bodyaches x 2 days       01/19/2018 9:04 AM     Chief complaint: Low blood pressure      Preeti Garvey is a 42 y.o. female with a history of psoriasis who presents to the ED for evaluation of episodes of low blood pressure x1 day per advice from her PCP. She reports that her lowest known blood pressure reading was about 84/54. She also reports constant fatigue x1 month, and states that her blood pressure is typically within normal limits when she has it taken at the doctor. The patient reports that she went to the physician for treatment of flu-like symptoms that she had x2 days including mild fever, chills, headache, chest tightness when breathing, and cough. She denies dysuria, chest pain, melena, blood in stools, and loss of appetite. Her  and son are both experiencing flu-like symptoms. There is no pertinent SHx noted.      The history is provided by the patient.     Review of patient's allergies indicates:   Allergen Reactions    Bactrim [sulfamethoxazole-trimethoprim] Rash     History reviewed. No pertinent past medical history.  History reviewed. No pertinent surgical history.  Family History   Problem Relation Age of Onset    Melanoma Neg Hx     Psoriasis Neg Hx     Lupus Neg Hx     Eczema Neg Hx      Social History   Substance Use Topics    Smoking status: Never Smoker    Smokeless tobacco: Never Used    Alcohol use Yes      Comment: once a month     Review of Systems   Constitutional: Positive for chills, fatigue and fever ( mild). Negative for appetite change.        Positive for low blood pressure reading PTA   HENT: Negative for sore throat.    Respiratory: Positive for cough and chest tightness ( when breathing). Negative for shortness of breath.    Cardiovascular:  Negative for chest pain.   Gastrointestinal: Negative for blood in stool and nausea.        Negative for melena.   Genitourinary: Negative for dysuria.   Musculoskeletal: Negative for back pain.   Skin: Negative for rash.   Neurological: Positive for headaches. Negative for weakness.   Hematological: Does not bruise/bleed easily.       Physical Exam     Initial Vitals [01/19/18 0846]   BP Pulse Resp Temp SpO2   (!) 110/57 83 18 99.1 °F (37.3 °C) 99 %      MAP       74.67         Physical Exam    Nursing note and vitals reviewed.  Constitutional: She appears well-developed.   HENT:   Head: Normocephalic and atraumatic.   Eyes: EOM are normal. Pupils are equal, round, and reactive to light.   Neck: Neck supple.   Cardiovascular: Normal rate, regular rhythm, normal heart sounds and intact distal pulses. Exam reveals no gallop and no friction rub.    No murmur heard.  There is no peripheral edema noted.   Pulmonary/Chest: Breath sounds normal. No respiratory distress. She has no decreased breath sounds. She has no wheezes. She has no rhonchi. She has no rales.   Abdominal: Soft. Bowel sounds are normal. There is no tenderness.   Musculoskeletal: Normal range of motion.   Neurological: She is alert and oriented to person, place, and time.   Skin: Skin is warm and dry.   There are psoriasis plaques noted on the right lower leg.   Psychiatric: She has a normal mood and affect.         ED Course   Procedures  Labs Reviewed - No data to display          Medical Decision Making:   History:   Old Medical Records: I decided to obtain old medical records.  Clinical Tests:   Lab Tests: Ordered and Reviewed  Patient appears much better.  She is likely slightly dehydrated from a viral illness that could be the flu but she is otherwise healthy and I don't think she is a Tamiflu candidate.  Her vital signs of improved.  Her labs all normal except for some very mild hyponatremia.  I feel that she can be safer discharge with  instructions to follow-up with primary care as needed with over-the-counter medicines.            Scribe Attestation:   Scribe #1: I performed the above scribed service and the documentation accurately describes the services I performed. I attest to the accuracy of the note.    I, Dr. Barrera Ramos personally performed the services described in this documentation. All medical record entries made by the scribe were at my direction and in my presence.  I have reviewed the chart and agree that the record reflects my personal performance and is accurate and complete. Barrera Ramos MD.  10:56 AM 01/19/2018    DISCLAIMER: This note was prepared with Dragon NaturallySpeaking voice recognition transcription software. Garbled syntax, mangled pronouns, and other bizarre constructions may be attributed to that software system         ED Course      Clinical Impression:   The encounter diagnosis was Flu-like symptoms.                           Barrera Ramos MD  01/19/18 5633

## 2018-05-14 LAB — HUMAN PAPILLOMAVIRUS (HPV): NORMAL

## 2018-09-14 ENCOUNTER — OFFICE VISIT (OUTPATIENT)
Dept: FAMILY MEDICINE | Facility: CLINIC | Age: 43
End: 2018-09-14
Payer: COMMERCIAL

## 2018-09-14 VITALS
HEIGHT: 62 IN | RESPIRATION RATE: 16 BRPM | TEMPERATURE: 98 F | OXYGEN SATURATION: 98 % | WEIGHT: 141.75 LBS | HEART RATE: 57 BPM | DIASTOLIC BLOOD PRESSURE: 64 MMHG | BODY MASS INDEX: 26.09 KG/M2 | SYSTOLIC BLOOD PRESSURE: 120 MMHG

## 2018-09-14 DIAGNOSIS — H69.92 ACUTE DYSFUNCTION OF LEFT EUSTACHIAN TUBE: Primary | ICD-10-CM

## 2018-09-14 PROCEDURE — 99213 OFFICE O/P EST LOW 20 MIN: CPT | Mod: S$GLB,,, | Performed by: PHYSICIAN ASSISTANT

## 2018-09-14 PROCEDURE — 3008F BODY MASS INDEX DOCD: CPT | Mod: CPTII,S$GLB,, | Performed by: PHYSICIAN ASSISTANT

## 2018-09-14 PROCEDURE — 99999 PR PBB SHADOW E&M-EST. PATIENT-LVL IV: CPT | Mod: PBBFAC,,, | Performed by: PHYSICIAN ASSISTANT

## 2018-09-14 NOTE — PROGRESS NOTES
Subjective:       Patient ID: Preeti Garvey is a 43 y.o. female.    Chief Complaint: Sore Throat and Otalgia (both ears )    Ear Fullness    There is pain in both ears. This is a new problem. The current episode started in the past 7 days. The problem occurs constantly. The problem has been gradually worsening. There has been no fever. The pain is moderate. Associated symptoms include a sore throat. Pertinent negatives include no abdominal pain, coughing, diarrhea, ear discharge, headaches, neck pain, rash, rhinorrhea or vomiting. She has tried nothing for the symptoms.     Review of Systems   Constitutional: Negative for activity change, appetite change and fever.   HENT: Positive for sinus pressure and sore throat. Negative for ear discharge, postnasal drip and rhinorrhea.    Eyes: Negative for visual disturbance.   Respiratory: Negative for cough and shortness of breath.    Cardiovascular: Negative for chest pain.   Gastrointestinal: Negative for abdominal distention, abdominal pain, diarrhea and vomiting.   Genitourinary: Negative for difficulty urinating and dysuria.   Musculoskeletal: Negative for arthralgias, myalgias and neck pain.   Skin: Negative for rash.   Neurological: Negative for headaches.   Hematological: Negative for adenopathy.   Psychiatric/Behavioral: The patient is not nervous/anxious.        Objective:      Physical Exam   Constitutional: She is oriented to person, place, and time.   HENT:   Mouth/Throat: Oropharynx is clear and moist. No oropharyngeal exudate.   Bilateral TM bulging with fluid bubbles present   Eyes: Conjunctivae are normal. Pupils are equal, round, and reactive to light.   Cardiovascular: Normal rate and regular rhythm.   Pulmonary/Chest: Effort normal and breath sounds normal. She has no wheezes.   Musculoskeletal: She exhibits no edema.   Lymphadenopathy:     She has no cervical adenopathy.   Neurological: She is alert and oriented to person, place, and time.   Skin:  No erythema.   Psychiatric: Her behavior is normal.       Assessment:       1. Acute dysfunction of left eustachian tube        Plan:   Preeti was seen today for sore throat and otalgia.    Diagnoses and all orders for this visit:    Acute dysfunction of left eustachian tube  Allegra 180mg OTC  Flonase OTC  If symptoms do not improve, call clinic for further evaluation and treatment.

## 2019-01-02 ENCOUNTER — TELEPHONE (OUTPATIENT)
Dept: FAMILY MEDICINE | Facility: CLINIC | Age: 44
End: 2019-01-02

## 2019-01-03 NOTE — TELEPHONE ENCOUNTER
----- Message from RT Alberto sent at 1/2/2019 11:27 AM CST -----  Contact: pt    pt , requesting an appt to be worked in today, injured her back and having pain issues, thanks.

## 2019-01-03 NOTE — TELEPHONE ENCOUNTER
Called and spoke to patient regarding appointment. Appointment scheduled to see Everett Mccormick NP on Friday 1/4/19

## 2019-01-16 ENCOUNTER — TELEPHONE (OUTPATIENT)
Dept: ADMINISTRATIVE | Facility: HOSPITAL | Age: 44
End: 2019-01-16

## 2019-01-16 ENCOUNTER — PATIENT OUTREACH (OUTPATIENT)
Dept: ADMINISTRATIVE | Facility: HOSPITAL | Age: 44
End: 2019-01-16

## 2019-01-16 NOTE — LETTER
January 16, 2019    Preeti Garvey  [address]  [place] [address]             [facility]  [address]  [facility] [address]  Phone: [phone] Dear Preeti Garvey,      [facility] is committed to your overall health.  To help you get the most out of each of your visits, we will review your information to make sure you are up to date on all of your recommended tests and/or procedures.      As a new patient to Dr. RAFA KIRK, we may not have your complete medical records.  She has found that your chart shows you may be due for a:    LIPID PANEL  PAP SMEAR  MAMMOGRAM    If you have had any of the above done at another facility, please bring the records or information with you so that your record at [facility] will be complete.      If you are currently taking medication, please bring it with you to your appointment for review.    Also, if you have any type of Advanced Directives, please bring them with you to your office visit so we may scan them into your chart.        Johanna Shields LPN Clinical Care Coordinator  [facility] [address] [place] [address]  Phone [phone]  Fax [phone]

## 2019-01-30 ENCOUNTER — OFFICE VISIT (OUTPATIENT)
Dept: FAMILY MEDICINE | Facility: CLINIC | Age: 44
End: 2019-01-30
Payer: COMMERCIAL

## 2019-01-30 VITALS
BODY MASS INDEX: 26.29 KG/M2 | RESPIRATION RATE: 12 BRPM | TEMPERATURE: 98 F | OXYGEN SATURATION: 98 % | DIASTOLIC BLOOD PRESSURE: 65 MMHG | HEIGHT: 62 IN | HEART RATE: 74 BPM | WEIGHT: 142.88 LBS | SYSTOLIC BLOOD PRESSURE: 109 MMHG

## 2019-01-30 DIAGNOSIS — Z12.39 SCREENING FOR BREAST CANCER: ICD-10-CM

## 2019-01-30 DIAGNOSIS — Z00.00 ANNUAL PHYSICAL EXAM: Primary | ICD-10-CM

## 2019-01-30 DIAGNOSIS — Z00.00 ROUTINE ADULT HEALTH MAINTENANCE: ICD-10-CM

## 2019-01-30 DIAGNOSIS — L40.9 PSORIASIS: ICD-10-CM

## 2019-01-30 DIAGNOSIS — J01.00 ACUTE NON-RECURRENT MAXILLARY SINUSITIS: ICD-10-CM

## 2019-01-30 PROCEDURE — 99999 PR PBB SHADOW E&M-EST. PATIENT-LVL IV: CPT | Mod: PBBFAC,,, | Performed by: FAMILY MEDICINE

## 2019-01-30 PROCEDURE — 99999 PR PBB SHADOW E&M-EST. PATIENT-LVL IV: ICD-10-PCS | Mod: PBBFAC,,, | Performed by: FAMILY MEDICINE

## 2019-01-30 PROCEDURE — 99396 PR PREVENTIVE VISIT,EST,40-64: ICD-10-PCS | Mod: S$GLB,,, | Performed by: FAMILY MEDICINE

## 2019-01-30 PROCEDURE — 99396 PREV VISIT EST AGE 40-64: CPT | Mod: S$GLB,,, | Performed by: FAMILY MEDICINE

## 2019-01-30 RX ORDER — AZITHROMYCIN 250 MG/1
TABLET, FILM COATED ORAL
Qty: 6 TABLET | Refills: 0 | Status: SHIPPED | OUTPATIENT
Start: 2019-01-30 | End: 2019-02-04

## 2019-01-30 NOTE — PROGRESS NOTES
Subjective:       Patient ID: Preeti Garvey is a 44 y.o. female.    Chief Complaint: Establish Care    HPI  Review of Systems   Constitutional: Positive for fatigue. Negative for chills and fever.   HENT: Positive for congestion, postnasal drip, rhinorrhea, sneezing and sore throat. Negative for ear discharge, ear pain and sinus pressure.    Respiratory: Positive for cough. Negative for shortness of breath and wheezing.        Patient Active Problem List   Diagnosis    Allergic rhinitis due to pollen    Muscle spasm    Psoriasis     Patient is here for a chronic conditions follow up.    On otezla for psoriasis Derm Peggy Santos- Daniel utd pap and mammo  TDap UTD 2 years ago    URI sx x 1 week-fever up to 100.6, chest congestion/tightness and occ wheezing. Prod yellow sputum. No HA. No n/v/d  Objective:      Physical Exam   Constitutional: She appears well-developed and well-nourished.   HENT:   Right Ear: Tympanic membrane and ear canal normal.   Left Ear: Tympanic membrane and ear canal normal.   Nose: Mucosal edema and rhinorrhea present. Right sinus exhibits no maxillary sinus tenderness and no frontal sinus tenderness. Left sinus exhibits no maxillary sinus tenderness and no frontal sinus tenderness.   Mouth/Throat: Posterior oropharyngeal erythema present. No oropharyngeal exudate, posterior oropharyngeal edema or tonsillar abscesses.   Cardiovascular: Normal rate, regular rhythm and normal heart sounds.   Pulmonary/Chest: Effort normal and breath sounds normal.   Skin: Skin is warm and dry.   Psychiatric: She has a normal mood and affect.   Nursing note and vitals reviewed.      Assessment:       1. Annual physical exam    2. Screening for breast cancer    3. Routine adult health maintenance    4. Psoriasis    5. Acute non-recurrent maxillary sinusitis        Plan:       1. Annual physical exam  Discussed health maintenance guidelines appropriate for age.  Printed materials  given.        2. Screening for breast cancer  Done 8/18    3. Routine adult health maintenance  See above    4. Psoriasis  Cont derm treatment    5. Acute non-recurrent maxillary sinusitis  I counseled the patient on general home care guidelines for cough and congestion including increasing fluid intake, getting plenty of rest and use of OTC cough and cold medications.  I recommended guafenesin for congestion and dextromethorphan as directed for cough.  A brand like Mucinex DM is recommended.  Avoidance of decongestants is recommended for patients with heart problems and hypertension.  Extra vitamin C may also benefit.  Return to clinic if symptoms last longer than 10 days or sooner if worsen with symptoms like fever > 100.4, severe sinus pain or headache, thick yellow nasal discharge or sputum, dehydration or lethargy.    If sx persist > 10 days or worsen then start  - azithromycin (Z-VASU) 250 MG tablet; Take 2 tablets by mouth on day 1; Take 1 tablet by mouth on days 2-5  Dispense: 6 tablet; Refill: 0        Time spent with patient: 20 minutes    Patient with be reevaluated in 1 year or sooner prn    Greater than 50% of this visit was spent counseling as described in above documentation:Yes

## 2019-11-22 ENCOUNTER — TELEPHONE (OUTPATIENT)
Dept: FAMILY MEDICINE | Facility: CLINIC | Age: 44
End: 2019-11-22

## 2019-11-22 ENCOUNTER — OFFICE VISIT (OUTPATIENT)
Dept: FAMILY MEDICINE | Facility: CLINIC | Age: 44
End: 2019-11-22
Payer: COMMERCIAL

## 2019-11-22 VITALS
HEIGHT: 62 IN | TEMPERATURE: 98 F | HEART RATE: 75 BPM | SYSTOLIC BLOOD PRESSURE: 100 MMHG | DIASTOLIC BLOOD PRESSURE: 70 MMHG | OXYGEN SATURATION: 98 % | WEIGHT: 139.31 LBS | RESPIRATION RATE: 12 BRPM | BODY MASS INDEX: 25.64 KG/M2

## 2019-11-22 DIAGNOSIS — J06.9 UPPER RESPIRATORY TRACT INFECTION, UNSPECIFIED TYPE: Primary | ICD-10-CM

## 2019-11-22 DIAGNOSIS — B35.1 ONYCHOMYCOSIS: ICD-10-CM

## 2019-11-22 LAB
INFLUENZA A, MOLECULAR: NEGATIVE
INFLUENZA B, MOLECULAR: NEGATIVE
SPECIMEN SOURCE: NORMAL

## 2019-11-22 PROCEDURE — 3008F PR BODY MASS INDEX (BMI) DOCUMENTED: ICD-10-PCS | Mod: CPTII,S$GLB,, | Performed by: FAMILY MEDICINE

## 2019-11-22 PROCEDURE — 99213 PR OFFICE/OUTPT VISIT, EST, LEVL III, 20-29 MIN: ICD-10-PCS | Mod: S$GLB,,, | Performed by: FAMILY MEDICINE

## 2019-11-22 PROCEDURE — 87502 INFLUENZA DNA AMP PROBE: CPT | Mod: PO

## 2019-11-22 PROCEDURE — 99999 PR PBB SHADOW E&M-EST. PATIENT-LVL III: ICD-10-PCS | Mod: PBBFAC,,, | Performed by: FAMILY MEDICINE

## 2019-11-22 PROCEDURE — 3008F BODY MASS INDEX DOCD: CPT | Mod: CPTII,S$GLB,, | Performed by: FAMILY MEDICINE

## 2019-11-22 PROCEDURE — 99213 OFFICE O/P EST LOW 20 MIN: CPT | Mod: S$GLB,,, | Performed by: FAMILY MEDICINE

## 2019-11-22 PROCEDURE — 99999 PR PBB SHADOW E&M-EST. PATIENT-LVL III: CPT | Mod: PBBFAC,,, | Performed by: FAMILY MEDICINE

## 2019-11-22 RX ORDER — BENZONATATE 200 MG/1
200 CAPSULE ORAL 3 TIMES DAILY PRN
Qty: 30 CAPSULE | Refills: 0 | Status: SHIPPED | OUTPATIENT
Start: 2019-11-22 | End: 2019-12-02

## 2019-11-22 RX ORDER — CICLOPIROX 80 MG/ML
SOLUTION TOPICAL NIGHTLY
Qty: 6.6 ML | Refills: 5 | Status: SHIPPED | OUTPATIENT
Start: 2019-11-22 | End: 2023-05-10

## 2019-11-22 NOTE — TELEPHONE ENCOUNTER
----- Message from Benji Urbina sent at 11/22/2019  1:37 PM CST -----  Contact: [phone] / self  Patient would like to get an antibiotic called in for discolored phlem. Please Advise.

## 2019-11-22 NOTE — PROGRESS NOTES
Subjective:       Patient ID: Preeti Garvey is a 44 y.o. female.    Chief Complaint: Cough congestion    HPI  Review of Systems   Constitutional: Positive for fatigue. Negative for chills and fever.   HENT: Positive for congestion, postnasal drip, rhinorrhea, sneezing and sore throat. Negative for ear discharge, ear pain and sinus pressure.    Respiratory: Positive for cough. Negative for shortness of breath and wheezing.        Patient Active Problem List   Diagnosis    Allergic rhinitis due to pollen    Muscle spasm    Psoriasis     Patient is here for URI sx x 2 days. Cough -wet, congestion, malaise. No HA. No fever. No sinus pain.  No ST. Mild left earache.      .    On otezla for psoriasis Derm Peggy Santos- Daniel utd pap and mammnnamdi  TDap UTD 2 years ago  Objective:      Physical Exam   Constitutional: She appears well-developed and well-nourished.   HENT:   Right Ear: Tympanic membrane and ear canal normal.   Left Ear: Tympanic membrane and ear canal normal.   Nose: Mucosal edema and rhinorrhea present. Right sinus exhibits no maxillary sinus tenderness and no frontal sinus tenderness. Left sinus exhibits no maxillary sinus tenderness and no frontal sinus tenderness.   Mouth/Throat: Posterior oropharyngeal erythema present. No oropharyngeal exudate, posterior oropharyngeal edema or tonsillar abscesses.   Cardiovascular: Normal rate, regular rhythm and normal heart sounds.   Pulmonary/Chest: Effort normal and breath sounds normal.   Skin: Skin is warm and dry.        Psychiatric: She has a normal mood and affect.   Nursing note and vitals reviewed.      Assessment:       1. Upper respiratory tract infection, unspecified type    2. Onychomycosis        Plan:       1. Upper respiratory tract infection, unspecified type  I counseled the patient on general home care guidelines for cough and congestion including increasing fluid intake, getting plenty of rest and use of OTC cough and cold  medications.  I recommended guafenesin for congestion and dextromethorphan as directed for cough.  A brand like Mucinex DM is recommended.  Avoidance of decongestants is recommended for patients with heart problems and hypertension.  Extra vitamin C may also benefit.  Return to clinic if symptoms last longer than 10 days or sooner if worsen with symptoms like fever > 100.4, severe sinus pain or headache, thick yellow nasal discharge or sputum, dehydration or lethargy.      - Influenza A & B by Molecular  - benzonatate (TESSALON) 200 MG capsule; Take 1 capsule (200 mg total) by mouth 3 (three) times daily as needed for Cough.  Dispense: 30 capsule; Refill: 0    2. Onychomycosis  Apply as directed until healthy nail grows out  - ciclopirox (PENLAC) 8 % Soln; Apply topically nightly.  Dispense: 6.6 mL; Refill: 5      Time spent with patient: 20 minutes    Patient with be reevaluated in 6 months or sooner prn    Greater than 50% of this visit was spent counseling as described in above documentation:Yes

## 2020-09-18 DIAGNOSIS — Z12.39 BREAST CANCER SCREENING: ICD-10-CM

## 2020-09-30 ENCOUNTER — PATIENT OUTREACH (OUTPATIENT)
Dept: ADMINISTRATIVE | Facility: HOSPITAL | Age: 45
End: 2020-09-30

## 2021-01-04 ENCOUNTER — PATIENT MESSAGE (OUTPATIENT)
Dept: ADMINISTRATIVE | Facility: HOSPITAL | Age: 46
End: 2021-01-04

## 2021-04-05 ENCOUNTER — PATIENT MESSAGE (OUTPATIENT)
Dept: ADMINISTRATIVE | Facility: HOSPITAL | Age: 46
End: 2021-04-05

## 2021-06-09 ENCOUNTER — TELEPHONE (OUTPATIENT)
Dept: FAMILY MEDICINE | Facility: CLINIC | Age: 46
End: 2021-06-09

## 2021-06-09 ENCOUNTER — OFFICE VISIT (OUTPATIENT)
Dept: FAMILY MEDICINE | Facility: CLINIC | Age: 46
End: 2021-06-09
Payer: COMMERCIAL

## 2021-06-09 VITALS
HEIGHT: 62 IN | TEMPERATURE: 98 F | WEIGHT: 138.88 LBS | SYSTOLIC BLOOD PRESSURE: 118 MMHG | HEART RATE: 67 BPM | OXYGEN SATURATION: 98 % | BODY MASS INDEX: 25.55 KG/M2 | DIASTOLIC BLOOD PRESSURE: 74 MMHG

## 2021-06-09 DIAGNOSIS — J30.9 ALLERGIC SINUSITIS: Primary | ICD-10-CM

## 2021-06-09 DIAGNOSIS — E66.3 OVERWEIGHT (BMI 25.0-29.9): ICD-10-CM

## 2021-06-09 PROCEDURE — 3008F BODY MASS INDEX DOCD: CPT | Mod: CPTII,S$GLB,, | Performed by: NURSE PRACTITIONER

## 2021-06-09 PROCEDURE — 3008F PR BODY MASS INDEX (BMI) DOCUMENTED: ICD-10-PCS | Mod: CPTII,S$GLB,, | Performed by: NURSE PRACTITIONER

## 2021-06-09 PROCEDURE — 99999 PR PBB SHADOW E&M-EST. PATIENT-LVL III: CPT | Mod: PBBFAC,,, | Performed by: NURSE PRACTITIONER

## 2021-06-09 PROCEDURE — 99999 PR PBB SHADOW E&M-EST. PATIENT-LVL III: ICD-10-PCS | Mod: PBBFAC,,, | Performed by: NURSE PRACTITIONER

## 2021-06-09 PROCEDURE — 99213 OFFICE O/P EST LOW 20 MIN: CPT | Mod: S$GLB,,, | Performed by: NURSE PRACTITIONER

## 2021-06-09 PROCEDURE — 99213 PR OFFICE/OUTPT VISIT, EST, LEVL III, 20-29 MIN: ICD-10-PCS | Mod: S$GLB,,, | Performed by: NURSE PRACTITIONER

## 2021-06-09 RX ORDER — CETIRIZINE HYDROCHLORIDE 10 MG/1
1 CAPSULE, LIQUID FILLED ORAL DAILY
Qty: 30 CAPSULE | Refills: 11 | Status: SHIPPED | OUTPATIENT
Start: 2021-06-09 | End: 2022-06-09

## 2021-10-07 ENCOUNTER — PATIENT MESSAGE (OUTPATIENT)
Dept: ADMINISTRATIVE | Facility: HOSPITAL | Age: 46
End: 2021-10-07

## 2021-10-20 ENCOUNTER — PATIENT MESSAGE (OUTPATIENT)
Dept: DERMATOLOGY | Facility: CLINIC | Age: 46
End: 2021-10-20
Payer: COMMERCIAL

## 2021-12-08 ENCOUNTER — OFFICE VISIT (OUTPATIENT)
Dept: FAMILY MEDICINE | Facility: CLINIC | Age: 46
End: 2021-12-08
Payer: COMMERCIAL

## 2021-12-08 VITALS
WEIGHT: 143.75 LBS | HEIGHT: 62 IN | BODY MASS INDEX: 26.45 KG/M2 | SYSTOLIC BLOOD PRESSURE: 120 MMHG | DIASTOLIC BLOOD PRESSURE: 68 MMHG | OXYGEN SATURATION: 98 % | TEMPERATURE: 98 F | HEART RATE: 87 BPM

## 2021-12-08 DIAGNOSIS — M79.10 MYALGIA: ICD-10-CM

## 2021-12-08 DIAGNOSIS — R05.9 COUGH: ICD-10-CM

## 2021-12-08 DIAGNOSIS — R53.83 FATIGUE, UNSPECIFIED TYPE: Primary | ICD-10-CM

## 2021-12-08 DIAGNOSIS — E66.3 OVERWEIGHT (BMI 25.0-29.9): ICD-10-CM

## 2021-12-08 LAB
INFLUENZA A, MOLECULAR: NEGATIVE
INFLUENZA B, MOLECULAR: NEGATIVE
SPECIMEN SOURCE: NORMAL

## 2021-12-08 PROCEDURE — 99999 PR PBB SHADOW E&M-EST. PATIENT-LVL III: ICD-10-PCS | Mod: PBBFAC,,, | Performed by: NURSE PRACTITIONER

## 2021-12-08 PROCEDURE — 87502 INFLUENZA DNA AMP PROBE: CPT | Mod: PO | Performed by: NURSE PRACTITIONER

## 2021-12-08 PROCEDURE — 99213 PR OFFICE/OUTPT VISIT, EST, LEVL III, 20-29 MIN: ICD-10-PCS | Mod: S$GLB,,, | Performed by: NURSE PRACTITIONER

## 2021-12-08 PROCEDURE — 99999 PR PBB SHADOW E&M-EST. PATIENT-LVL III: CPT | Mod: PBBFAC,,, | Performed by: NURSE PRACTITIONER

## 2021-12-08 PROCEDURE — 99213 OFFICE O/P EST LOW 20 MIN: CPT | Mod: S$GLB,,, | Performed by: NURSE PRACTITIONER

## 2022-04-18 ENCOUNTER — PATIENT MESSAGE (OUTPATIENT)
Dept: ADMINISTRATIVE | Facility: OTHER | Age: 47
End: 2022-04-18
Payer: COMMERCIAL

## 2022-04-18 ENCOUNTER — PATIENT OUTREACH (OUTPATIENT)
Dept: ADMINISTRATIVE | Facility: OTHER | Age: 47
End: 2022-04-18
Payer: COMMERCIAL

## 2022-04-18 DIAGNOSIS — Z12.31 ENCOUNTER FOR SCREENING MAMMOGRAM FOR MALIGNANT NEOPLASM OF BREAST: ICD-10-CM

## 2022-04-18 DIAGNOSIS — Z12.11 ENCOUNTER FOR FIT (FECAL IMMUNOCHEMICAL TEST) SCREENING: Primary | ICD-10-CM

## 2022-04-18 NOTE — PROGRESS NOTES
Chart was reviewed for overdue [facility] ([facility])  topics  Updates were requested from care everywhere  Health Maintenance has been updated  [place] immunization registry triggered   mammogram ordered and tasked  Fit kit ordered

## 2022-05-31 ENCOUNTER — PATIENT MESSAGE (OUTPATIENT)
Dept: ADMINISTRATIVE | Facility: HOSPITAL | Age: 47
End: 2022-05-31
Payer: COMMERCIAL

## 2022-06-13 ENCOUNTER — OFFICE VISIT (OUTPATIENT)
Dept: FAMILY MEDICINE | Facility: CLINIC | Age: 47
End: 2022-06-13
Payer: COMMERCIAL

## 2022-06-13 ENCOUNTER — LAB VISIT (OUTPATIENT)
Dept: LAB | Facility: HOSPITAL | Age: 47
End: 2022-06-13
Attending: FAMILY MEDICINE
Payer: COMMERCIAL

## 2022-06-13 ENCOUNTER — TELEPHONE (OUTPATIENT)
Dept: FAMILY MEDICINE | Facility: CLINIC | Age: 47
End: 2022-06-13

## 2022-06-13 VITALS
WEIGHT: 146.19 LBS | DIASTOLIC BLOOD PRESSURE: 70 MMHG | SYSTOLIC BLOOD PRESSURE: 110 MMHG | HEIGHT: 62 IN | OXYGEN SATURATION: 97 % | RESPIRATION RATE: 16 BRPM | HEART RATE: 75 BPM | TEMPERATURE: 99 F | BODY MASS INDEX: 26.9 KG/M2

## 2022-06-13 DIAGNOSIS — Z00.00 ANNUAL PHYSICAL EXAM: Primary | ICD-10-CM

## 2022-06-13 DIAGNOSIS — R53.83 FATIGUE, UNSPECIFIED TYPE: ICD-10-CM

## 2022-06-13 DIAGNOSIS — Z11.4 ENCOUNTER FOR SCREENING FOR HIV: ICD-10-CM

## 2022-06-13 DIAGNOSIS — Z12.11 COLON CANCER SCREENING: ICD-10-CM

## 2022-06-13 DIAGNOSIS — Z00.00 ANNUAL PHYSICAL EXAM: ICD-10-CM

## 2022-06-13 DIAGNOSIS — R23.2 HOT FLASHES: ICD-10-CM

## 2022-06-13 DIAGNOSIS — N92.0 MENORRHAGIA WITH REGULAR CYCLE: ICD-10-CM

## 2022-06-13 LAB
25(OH)D3+25(OH)D2 SERPL-MCNC: 28 NG/ML (ref 30–96)
ALBUMIN SERPL BCP-MCNC: 3.7 G/DL (ref 3.5–5.2)
ALP SERPL-CCNC: 86 U/L (ref 55–135)
ALT SERPL W/O P-5'-P-CCNC: 17 U/L (ref 10–44)
ANION GAP SERPL CALC-SCNC: 10 MMOL/L (ref 8–16)
AST SERPL-CCNC: 18 U/L (ref 10–40)
BASOPHILS # BLD AUTO: 0.05 K/UL (ref 0–0.2)
BASOPHILS NFR BLD: 0.8 % (ref 0–1.9)
BILIRUB SERPL-MCNC: 0.2 MG/DL (ref 0.1–1)
BUN SERPL-MCNC: 12 MG/DL (ref 6–20)
CALCIUM SERPL-MCNC: 9.6 MG/DL (ref 8.7–10.5)
CHLORIDE SERPL-SCNC: 107 MMOL/L (ref 95–110)
CHOLEST SERPL-MCNC: 171 MG/DL (ref 120–199)
CHOLEST/HDLC SERPL: 3.7 {RATIO} (ref 2–5)
CO2 SERPL-SCNC: 21 MMOL/L (ref 23–29)
CREAT SERPL-MCNC: 0.7 MG/DL (ref 0.5–1.4)
DIFFERENTIAL METHOD: ABNORMAL
EOSINOPHIL # BLD AUTO: 0.2 K/UL (ref 0–0.5)
EOSINOPHIL NFR BLD: 3 % (ref 0–8)
ERYTHROCYTE [DISTWIDTH] IN BLOOD BY AUTOMATED COUNT: 16.8 % (ref 11.5–14.5)
EST. GFR  (AFRICAN AMERICAN): >60 ML/MIN/1.73 M^2
EST. GFR  (NON AFRICAN AMERICAN): >60 ML/MIN/1.73 M^2
ESTRADIOL SERPL-MCNC: 81 PG/ML
FERRITIN SERPL-MCNC: 7 NG/ML (ref 20–300)
FSH SERPL-ACNC: 5.2 MIU/ML
GLUCOSE SERPL-MCNC: 92 MG/DL (ref 70–110)
HCT VFR BLD AUTO: 39.6 % (ref 37–48.5)
HDLC SERPL-MCNC: 46 MG/DL (ref 40–75)
HDLC SERPL: 26.9 % (ref 20–50)
HGB BLD-MCNC: 11.5 G/DL (ref 12–16)
IMM GRANULOCYTES # BLD AUTO: 0.01 K/UL (ref 0–0.04)
IMM GRANULOCYTES NFR BLD AUTO: 0.2 % (ref 0–0.5)
IRON SERPL-MCNC: 19 UG/DL (ref 30–160)
LDLC SERPL CALC-MCNC: 101.8 MG/DL (ref 63–159)
LYMPHOCYTES # BLD AUTO: 1.9 K/UL (ref 1–4.8)
LYMPHOCYTES NFR BLD: 29.6 % (ref 18–48)
MCH RBC QN AUTO: 22.7 PG (ref 27–31)
MCHC RBC AUTO-ENTMCNC: 29 G/DL (ref 32–36)
MCV RBC AUTO: 78 FL (ref 82–98)
MONOCYTES # BLD AUTO: 0.6 K/UL (ref 0.3–1)
MONOCYTES NFR BLD: 9.2 % (ref 4–15)
NEUTROPHILS # BLD AUTO: 3.6 K/UL (ref 1.8–7.7)
NEUTROPHILS NFR BLD: 57.2 % (ref 38–73)
NONHDLC SERPL-MCNC: 125 MG/DL
NRBC BLD-RTO: 0 /100 WBC
PLATELET # BLD AUTO: 416 K/UL (ref 150–450)
PMV BLD AUTO: 9.2 FL (ref 9.2–12.9)
POTASSIUM SERPL-SCNC: 4.2 MMOL/L (ref 3.5–5.1)
PROGEST SERPL-MCNC: 4.1 NG/ML
PROT SERPL-MCNC: 7.4 G/DL (ref 6–8.4)
RBC # BLD AUTO: 5.06 M/UL (ref 4–5.4)
SATURATED IRON: 4 % (ref 20–50)
SODIUM SERPL-SCNC: 138 MMOL/L (ref 136–145)
TOTAL IRON BINDING CAPACITY: 503 UG/DL (ref 250–450)
TRANSFERRIN SERPL-MCNC: 340 MG/DL (ref 200–375)
TRIGL SERPL-MCNC: 116 MG/DL (ref 30–150)
TSH SERPL DL<=0.005 MIU/L-ACNC: 1.66 UIU/ML (ref 0.4–4)
VIT B12 SERPL-MCNC: 1521 PG/ML (ref 210–950)
WBC # BLD AUTO: 6.28 K/UL (ref 3.9–12.7)

## 2022-06-13 PROCEDURE — 99999 PR PBB SHADOW E&M-EST. PATIENT-LVL IV: CPT | Mod: PBBFAC,,, | Performed by: FAMILY MEDICINE

## 2022-06-13 PROCEDURE — 80061 LIPID PANEL: CPT | Performed by: FAMILY MEDICINE

## 2022-06-13 PROCEDURE — 3008F PR BODY MASS INDEX (BMI) DOCUMENTED: ICD-10-PCS | Mod: CPTII,S$GLB,, | Performed by: FAMILY MEDICINE

## 2022-06-13 PROCEDURE — 83001 ASSAY OF GONADOTROPIN (FSH): CPT | Performed by: FAMILY MEDICINE

## 2022-06-13 PROCEDURE — 1160F PR REVIEW ALL MEDS BY PRESCRIBER/CLIN PHARMACIST DOCUMENTED: ICD-10-PCS | Mod: CPTII,S$GLB,, | Performed by: FAMILY MEDICINE

## 2022-06-13 PROCEDURE — 82306 VITAMIN D 25 HYDROXY: CPT | Performed by: FAMILY MEDICINE

## 2022-06-13 PROCEDURE — 1159F PR MEDICATION LIST DOCUMENTED IN MEDICAL RECORD: ICD-10-PCS | Mod: CPTII,S$GLB,, | Performed by: FAMILY MEDICINE

## 2022-06-13 PROCEDURE — 3074F PR MOST RECENT SYSTOLIC BLOOD PRESSURE < 130 MM HG: ICD-10-PCS | Mod: CPTII,S$GLB,, | Performed by: FAMILY MEDICINE

## 2022-06-13 PROCEDURE — 3078F DIAST BP <80 MM HG: CPT | Mod: CPTII,S$GLB,, | Performed by: FAMILY MEDICINE

## 2022-06-13 PROCEDURE — 82670 ASSAY OF TOTAL ESTRADIOL: CPT | Performed by: FAMILY MEDICINE

## 2022-06-13 PROCEDURE — 3074F SYST BP LT 130 MM HG: CPT | Mod: CPTII,S$GLB,, | Performed by: FAMILY MEDICINE

## 2022-06-13 PROCEDURE — 99396 PREV VISIT EST AGE 40-64: CPT | Mod: S$GLB,,, | Performed by: FAMILY MEDICINE

## 2022-06-13 PROCEDURE — 85025 COMPLETE CBC W/AUTO DIFF WBC: CPT | Performed by: FAMILY MEDICINE

## 2022-06-13 PROCEDURE — 84144 ASSAY OF PROGESTERONE: CPT | Performed by: FAMILY MEDICINE

## 2022-06-13 PROCEDURE — 82728 ASSAY OF FERRITIN: CPT | Performed by: FAMILY MEDICINE

## 2022-06-13 PROCEDURE — 99396 PR PREVENTIVE VISIT,EST,40-64: ICD-10-PCS | Mod: S$GLB,,, | Performed by: FAMILY MEDICINE

## 2022-06-13 PROCEDURE — 84443 ASSAY THYROID STIM HORMONE: CPT | Performed by: FAMILY MEDICINE

## 2022-06-13 PROCEDURE — 3008F BODY MASS INDEX DOCD: CPT | Mod: CPTII,S$GLB,, | Performed by: FAMILY MEDICINE

## 2022-06-13 PROCEDURE — 1160F RVW MEDS BY RX/DR IN RCRD: CPT | Mod: CPTII,S$GLB,, | Performed by: FAMILY MEDICINE

## 2022-06-13 PROCEDURE — 87389 HIV-1 AG W/HIV-1&-2 AB AG IA: CPT | Performed by: FAMILY MEDICINE

## 2022-06-13 PROCEDURE — 80053 COMPREHEN METABOLIC PANEL: CPT | Performed by: FAMILY MEDICINE

## 2022-06-13 PROCEDURE — 3078F PR MOST RECENT DIASTOLIC BLOOD PRESSURE < 80 MM HG: ICD-10-PCS | Mod: CPTII,S$GLB,, | Performed by: FAMILY MEDICINE

## 2022-06-13 PROCEDURE — 82607 VITAMIN B-12: CPT | Performed by: FAMILY MEDICINE

## 2022-06-13 PROCEDURE — 84466 ASSAY OF TRANSFERRIN: CPT | Performed by: FAMILY MEDICINE

## 2022-06-13 PROCEDURE — 99999 PR PBB SHADOW E&M-EST. PATIENT-LVL IV: ICD-10-PCS | Mod: PBBFAC,,, | Performed by: FAMILY MEDICINE

## 2022-06-13 PROCEDURE — 1159F MED LIST DOCD IN RCRD: CPT | Mod: CPTII,S$GLB,, | Performed by: FAMILY MEDICINE

## 2022-06-13 PROCEDURE — 36415 COLL VENOUS BLD VENIPUNCTURE: CPT | Mod: PO | Performed by: FAMILY MEDICINE

## 2022-06-13 NOTE — TELEPHONE ENCOUNTER
----- Message from Corinna Noriega sent at 6/13/2022  3:35 PM CDT -----  Contact: self  Patient has an ultrasound of her pelvis in epic but she needs the order faxed to [facility] on [address] in [place].  Then please call the patient  back at [phone] (home) so she can go get this done and thanks

## 2022-06-13 NOTE — TELEPHONE ENCOUNTER
----- Message from Arely Lewis sent at 6/13/2022  9:25 AM CDT -----  Regarding: Call Back  Who Called: pt          What is the reason for the call: pt calling to have orders faxed for US TO [facility] on [address] .. fax [phone]. Please fax          Can patient be contacted on [employer]: Yes          Call back number: [phone]

## 2022-06-13 NOTE — TELEPHONE ENCOUNTER
----- Message from Eda  sent at 6/13/2022 10:25 AM CDT -----  Regarding: orders sent over  Type: Needs Medical Advice    Who Called:      Best Call Back Number: [phone]  Additional Information: Requesting a call back from Nurse, regarding pt needs orders sent over to [facility] ,please advise       Fax number-

## 2022-06-13 NOTE — PROGRESS NOTES
Subjective:       Patient ID: Preeti Garvey is a 47 y.o. female.    Chief Complaint: Fatigue and Hot Flashes    HPI  Review of Systems   Constitutional: Negative for fatigue and unexpected weight change.   Respiratory: Negative for chest tightness and shortness of breath.    Cardiovascular: Negative for chest pain, palpitations and leg swelling.   Gastrointestinal: Negative for abdominal pain.   Musculoskeletal: Negative for arthralgias.   Neurological: Positive for light-headedness. Negative for dizziness, syncope and headaches.       Patient Active Problem List   Diagnosis    Allergic rhinitis due to pollen    Muscle spasm    Psoriasis     Patient is here for a chronic conditions follow up.    C/o episode of severe lightheadedness, flushing then sweats lasting up to 40 min worsening in intensity x 1 year. Sometimes occurring in night.  No fainting. No CP/sob. Fatigue. Periods are very heavy, lasting 1 week, passing clots, pain in llq 1 week before, had 2 periods in 1 month last month. Has gyn she sees 1 x year. No abnl paps    On otezla for psoriasis Derm Peggy Sanchez utd pap and mammo 2021  TDap UTD 2017  Objective:      Physical Exam  Vitals and nursing note reviewed.   Constitutional:       Appearance: She is well-developed.   HENT:      Right Ear: Tympanic membrane and ear canal normal.      Left Ear: Tympanic membrane and ear canal normal.   Neck:      Thyroid: No thyromegaly.   Cardiovascular:      Rate and Rhythm: Normal rate and regular rhythm.      Heart sounds: Normal heart sounds.   Pulmonary:      Effort: Pulmonary effort is normal.      Breath sounds: Normal breath sounds.   Abdominal:      General: Abdomen is flat. Bowel sounds are normal. There is no distension.      Tenderness: There is abdominal tenderness (llq).   Lymphadenopathy:      Cervical: Cervical adenopathy present.   Skin:     General: Skin is warm and dry.   Neurological:      Mental Status: She is alert and  oriented to person, place, and time.   Psychiatric:         Mood and Affect: Mood normal.         Thought Content: Thought content normal.         Assessment:       1. Annual physical exam    2. Fatigue, unspecified type    3. Hot flashes    4. Encounter for screening for HIV    5. Menorrhagia with regular cycle    6. Colon cancer screening        Plan:         1. Annual physical exam  Discussed health maintenance guidelines appropriate for age.      - CBC Auto Differential; Future  - Comprehensive Metabolic Panel; Future  - Lipid Panel; Future    2. Fatigue, unspecified type  Screen and treat as indicated:    - TSH; Future  - Vitamin D; Future  - Vitamin B12; Future    3. Hot flashes  Screen and treat as indicated:    - FOLLICLE STIMULATING HORMONE; Future  - Estradiol; Future  - PROGESTERONE; Future    4. Encounter for screening for HIV  Screen and treat as indicated:    - HIV 1/2 Ag/Ab (4th Gen); Future    5. Menorrhagia with regular cycle  Screen and treat as indicated:  Consider gyn referral based on findings- Dr. Santos  - Ferritin; Future  - Iron and TIBC; Future  - US Pelvis Complete Non OB; Future    6. Colon cancer screening  Refer for screening colonoscopy  - Case Request Endoscopy: COLONOSCOPY        Time spent with patient: 20 minutes    Patient with be reevaluated in 3 months or sooner prn    Greater than 50% of this visit was spent counseling as described in above documentation:Yes

## 2022-06-14 ENCOUNTER — TELEPHONE (OUTPATIENT)
Dept: INTERVENTIONAL RADIOLOGY/VASCULAR | Facility: HOSPITAL | Age: 47
End: 2022-06-14
Payer: COMMERCIAL

## 2022-06-14 ENCOUNTER — TELEPHONE (OUTPATIENT)
Dept: FAMILY MEDICINE | Facility: CLINIC | Age: 47
End: 2022-06-14
Payer: COMMERCIAL

## 2022-06-14 ENCOUNTER — PATIENT MESSAGE (OUTPATIENT)
Dept: GASTROENTEROLOGY | Facility: CLINIC | Age: 47
End: 2022-06-14
Payer: COMMERCIAL

## 2022-06-14 ENCOUNTER — PATIENT MESSAGE (OUTPATIENT)
Dept: FAMILY MEDICINE | Facility: CLINIC | Age: 47
End: 2022-06-14
Payer: COMMERCIAL

## 2022-06-14 DIAGNOSIS — D50.9 IRON DEFICIENCY ANEMIA, UNSPECIFIED IRON DEFICIENCY ANEMIA TYPE: Primary | ICD-10-CM

## 2022-06-14 LAB — HIV 1+2 AB+HIV1 P24 AG SERPL QL IA: NEGATIVE

## 2022-06-14 RX ORDER — FERROUS SULFATE 325(65) MG
325 TABLET, DELAYED RELEASE (ENTERIC COATED) ORAL DAILY
Qty: 90 TABLET | Refills: 3 | Status: SHIPPED | OUTPATIENT
Start: 2022-06-14 | End: 2022-12-14 | Stop reason: SDUPTHER

## 2022-06-14 NOTE — TELEPHONE ENCOUNTER
----- Message from Radha Suarez sent at 6/14/2022 10:57 AM CDT -----  .Type:  Patient Call Back    Who Called: PT       Does the patient know what this is regarding?: PT RECEIVED HER LAB RESULTS SHE WOULD LIKE TO DISCUSS THEM WITH THE OFFICE     Would the patient rather a call back YES     Best Call Back Number: [phone]    Additional Information: Thank You

## 2022-06-15 DIAGNOSIS — Z12.31 OTHER SCREENING MAMMOGRAM: ICD-10-CM

## 2022-06-20 ENCOUNTER — PATIENT MESSAGE (OUTPATIENT)
Dept: ADMINISTRATIVE | Facility: HOSPITAL | Age: 47
End: 2022-06-20
Payer: COMMERCIAL

## 2022-08-24 ENCOUNTER — PATIENT MESSAGE (OUTPATIENT)
Dept: ADMINISTRATIVE | Facility: HOSPITAL | Age: 47
End: 2022-08-24
Payer: COMMERCIAL

## 2022-09-08 ENCOUNTER — TELEPHONE (OUTPATIENT)
Dept: FAMILY MEDICINE | Facility: CLINIC | Age: 47
End: 2022-09-08
Payer: COMMERCIAL

## 2022-09-08 DIAGNOSIS — D50.9 IRON DEFICIENCY ANEMIA, UNSPECIFIED IRON DEFICIENCY ANEMIA TYPE: Primary | ICD-10-CM

## 2022-09-08 NOTE — TELEPHONE ENCOUNTER
----- Message from Talia Bundy sent at 9/8/2022 11:40 AM CDT -----  Type: Patient Call Back         Who called: Pt          What is the request in detail: pt called in regarding concerns on wondering if she has to do blood work for up coming follow up visit on 9/13/22         Can the clinic reply by [facility]?no          Would the patient rather a call back or a response via [facility]?call back          Best call back number:[phone] (mobile)          Additional Information:           Thank You

## 2022-09-09 ENCOUNTER — LAB VISIT (OUTPATIENT)
Dept: LAB | Facility: HOSPITAL | Age: 47
End: 2022-09-09
Attending: FAMILY MEDICINE
Payer: COMMERCIAL

## 2022-09-09 DIAGNOSIS — D50.9 IRON DEFICIENCY ANEMIA, UNSPECIFIED IRON DEFICIENCY ANEMIA TYPE: ICD-10-CM

## 2022-09-09 PROCEDURE — 36415 COLL VENOUS BLD VENIPUNCTURE: CPT | Mod: PO | Performed by: FAMILY MEDICINE

## 2022-09-09 PROCEDURE — 82728 ASSAY OF FERRITIN: CPT | Performed by: FAMILY MEDICINE

## 2022-09-09 PROCEDURE — 85025 COMPLETE CBC W/AUTO DIFF WBC: CPT | Performed by: FAMILY MEDICINE

## 2022-09-09 PROCEDURE — 84466 ASSAY OF TRANSFERRIN: CPT | Performed by: FAMILY MEDICINE

## 2022-09-10 LAB
BASOPHILS # BLD AUTO: 0.06 K/UL (ref 0–0.2)
BASOPHILS NFR BLD: 0.8 % (ref 0–1.9)
DIFFERENTIAL METHOD: ABNORMAL
EOSINOPHIL # BLD AUTO: 0.2 K/UL (ref 0–0.5)
EOSINOPHIL NFR BLD: 2.3 % (ref 0–8)
ERYTHROCYTE [DISTWIDTH] IN BLOOD BY AUTOMATED COUNT: 16.4 % (ref 11.5–14.5)
FERRITIN SERPL-MCNC: 9 NG/ML (ref 20–300)
HCT VFR BLD AUTO: 40.2 % (ref 37–48.5)
HGB BLD-MCNC: 12.9 G/DL (ref 12–16)
IMM GRANULOCYTES # BLD AUTO: 0.02 K/UL (ref 0–0.04)
IMM GRANULOCYTES NFR BLD AUTO: 0.3 % (ref 0–0.5)
IRON SERPL-MCNC: 60 UG/DL (ref 30–160)
LYMPHOCYTES # BLD AUTO: 2.3 K/UL (ref 1–4.8)
LYMPHOCYTES NFR BLD: 31.3 % (ref 18–48)
MCH RBC QN AUTO: 26.5 PG (ref 27–31)
MCHC RBC AUTO-ENTMCNC: 32.1 G/DL (ref 32–36)
MCV RBC AUTO: 83 FL (ref 82–98)
MONOCYTES # BLD AUTO: 0.7 K/UL (ref 0.3–1)
MONOCYTES NFR BLD: 9.8 % (ref 4–15)
NEUTROPHILS # BLD AUTO: 4.1 K/UL (ref 1.8–7.7)
NEUTROPHILS NFR BLD: 55.5 % (ref 38–73)
NRBC BLD-RTO: 0 /100 WBC
PLATELET # BLD AUTO: 367 K/UL (ref 150–450)
PMV BLD AUTO: 9.6 FL (ref 9.2–12.9)
RBC # BLD AUTO: 4.87 M/UL (ref 4–5.4)
SATURATED IRON: 14 % (ref 20–50)
TOTAL IRON BINDING CAPACITY: 440 UG/DL (ref 250–450)
TRANSFERRIN SERPL-MCNC: 297 MG/DL (ref 200–375)
WBC # BLD AUTO: 7.42 K/UL (ref 3.9–12.7)

## 2022-09-13 ENCOUNTER — OFFICE VISIT (OUTPATIENT)
Dept: FAMILY MEDICINE | Facility: CLINIC | Age: 47
End: 2022-09-13
Payer: COMMERCIAL

## 2022-09-13 VITALS
TEMPERATURE: 99 F | DIASTOLIC BLOOD PRESSURE: 60 MMHG | SYSTOLIC BLOOD PRESSURE: 110 MMHG | OXYGEN SATURATION: 97 % | BODY MASS INDEX: 27.1 KG/M2 | HEIGHT: 62 IN | HEART RATE: 86 BPM | WEIGHT: 147.25 LBS | RESPIRATION RATE: 14 BRPM

## 2022-09-13 DIAGNOSIS — Z12.31 ENCOUNTER FOR SCREENING MAMMOGRAM FOR MALIGNANT NEOPLASM OF BREAST: ICD-10-CM

## 2022-09-13 DIAGNOSIS — E55.9 VITAMIN D DEFICIENCY: ICD-10-CM

## 2022-09-13 DIAGNOSIS — E53.8 B12 DEFICIENCY: ICD-10-CM

## 2022-09-13 DIAGNOSIS — Z00.00 ANNUAL PHYSICAL EXAM: ICD-10-CM

## 2022-09-13 DIAGNOSIS — D50.9 IRON DEFICIENCY ANEMIA, UNSPECIFIED IRON DEFICIENCY ANEMIA TYPE: Primary | ICD-10-CM

## 2022-09-13 DIAGNOSIS — Z12.11 COLON CANCER SCREENING: ICD-10-CM

## 2022-09-13 PROCEDURE — 3008F BODY MASS INDEX DOCD: CPT | Mod: CPTII,S$GLB,, | Performed by: FAMILY MEDICINE

## 2022-09-13 PROCEDURE — 99999 PR PBB SHADOW E&M-EST. PATIENT-LVL IV: ICD-10-PCS | Mod: PBBFAC,,, | Performed by: FAMILY MEDICINE

## 2022-09-13 PROCEDURE — 3074F PR MOST RECENT SYSTOLIC BLOOD PRESSURE < 130 MM HG: ICD-10-PCS | Mod: CPTII,S$GLB,, | Performed by: FAMILY MEDICINE

## 2022-09-13 PROCEDURE — 1159F PR MEDICATION LIST DOCUMENTED IN MEDICAL RECORD: ICD-10-PCS | Mod: CPTII,S$GLB,, | Performed by: FAMILY MEDICINE

## 2022-09-13 PROCEDURE — 3074F SYST BP LT 130 MM HG: CPT | Mod: CPTII,S$GLB,, | Performed by: FAMILY MEDICINE

## 2022-09-13 PROCEDURE — 99214 PR OFFICE/OUTPT VISIT, EST, LEVL IV, 30-39 MIN: ICD-10-PCS | Mod: S$GLB,,, | Performed by: FAMILY MEDICINE

## 2022-09-13 PROCEDURE — 3078F DIAST BP <80 MM HG: CPT | Mod: CPTII,S$GLB,, | Performed by: FAMILY MEDICINE

## 2022-09-13 PROCEDURE — 1160F RVW MEDS BY RX/DR IN RCRD: CPT | Mod: CPTII,S$GLB,, | Performed by: FAMILY MEDICINE

## 2022-09-13 PROCEDURE — 99214 OFFICE O/P EST MOD 30 MIN: CPT | Mod: S$GLB,,, | Performed by: FAMILY MEDICINE

## 2022-09-13 PROCEDURE — 3078F PR MOST RECENT DIASTOLIC BLOOD PRESSURE < 80 MM HG: ICD-10-PCS | Mod: CPTII,S$GLB,, | Performed by: FAMILY MEDICINE

## 2022-09-13 PROCEDURE — 99999 PR PBB SHADOW E&M-EST. PATIENT-LVL IV: CPT | Mod: PBBFAC,,, | Performed by: FAMILY MEDICINE

## 2022-09-13 PROCEDURE — 1160F PR REVIEW ALL MEDS BY PRESCRIBER/CLIN PHARMACIST DOCUMENTED: ICD-10-PCS | Mod: CPTII,S$GLB,, | Performed by: FAMILY MEDICINE

## 2022-09-13 PROCEDURE — 1159F MED LIST DOCD IN RCRD: CPT | Mod: CPTII,S$GLB,, | Performed by: FAMILY MEDICINE

## 2022-09-13 PROCEDURE — 3008F PR BODY MASS INDEX (BMI) DOCUMENTED: ICD-10-PCS | Mod: CPTII,S$GLB,, | Performed by: FAMILY MEDICINE

## 2022-09-13 NOTE — PROGRESS NOTES
Subjective:       Patient ID: Preeti Garvey is a 47 y.o. female.    Chief Complaint: Follow-up (3mth f/u)    HPI  Review of Systems   Constitutional:  Negative for fatigue and unexpected weight change.   Respiratory:  Negative for chest tightness and shortness of breath.    Cardiovascular:  Negative for chest pain, palpitations and leg swelling.   Gastrointestinal:  Negative for abdominal pain.   Musculoskeletal:  Negative for arthralgias.   Neurological:  Negative for dizziness, syncope, weakness, light-headedness and headaches.     Patient Active Problem List   Diagnosis    Allergic rhinitis due to pollen    Muscle spasm    Psoriasis    Iron deficiency anemia     Patient is here for a chronic conditions follow up.    Reviewed labs 9/2022-    At last visit 6/2022:c/o episode of severe lightheadedness, flushing then sweats lasting up to 40 min worsening in intensity x 1 year. Sometimes occurring in night.  No fainting. No CP/sob. Fatigue. Periods are very heavy, lasting 1 week, passing clots, pain in llq 1 week before, had 2 periods in 1 month last month. Has gyn she sees 1 x year. No abnl paps. Found to have mild iron def anemia. No further spells since last appt     On otezla for psoriasis Derm Peggy Sanchez utd pap and mammo 2021  Did follow up with GYN and had medicine progresterone for a week. Cleared lining and has reduced severity of periods    TDap UTD 2017  Objective:      Physical Exam  Vitals and nursing note reviewed.   Constitutional:       Appearance: She is well-developed.   Cardiovascular:      Rate and Rhythm: Normal rate and regular rhythm.      Heart sounds: Normal heart sounds.   Pulmonary:      Effort: Pulmonary effort is normal.      Breath sounds: Normal breath sounds.   Skin:     General: Skin is warm and dry.   Neurological:      Mental Status: She is alert and oriented to person, place, and time.       Assessment:       1. Iron deficiency anemia, unspecified iron  deficiency anemia type    2. Annual physical exam    3. Vitamin D deficiency    4. B12 deficiency    5. Colon cancer screening    6. Encounter for screening mammogram for malignant neoplasm of breast        Plan:         1. Iron deficiency anemia, unspecified iron deficiency anemia type  Improving. Cont iron supplement and monitor.  - CBC Auto Differential; Future  - Comprehensive Metabolic Panel; Future  - Ferritin; Future  - Iron and TIBC; Future    2. Annual physical exam  Screen and treat as indicated:    - Lipid Panel; Future    3. Vitamin D deficiency  Screen and treat as indicated:    - Vitamin D; Future    4. B12 deficiency  Screen and treat as indicated:    - Vitamin B12; Future    5. Colon cancer screening  Screen and treat as indicated:    - Case Request Endoscopy: COLONOSCOPY    6. Encounter for screening mammogram for malignant neoplasm of breast  Screen and treat as indicated:    - Mammo Digital Screening Bilat; Future  - Mammo Digital Screening Bilat      Time spent with patient: 20 minutes    Patient with be reevaluated in 6 months or sooner prn    Greater than 50% of this visit was spent counseling as described in above documentation:Yes

## 2022-09-14 ENCOUNTER — PATIENT MESSAGE (OUTPATIENT)
Dept: GASTROENTEROLOGY | Facility: CLINIC | Age: 47
End: 2022-09-14
Payer: COMMERCIAL

## 2022-09-14 ENCOUNTER — TELEPHONE (OUTPATIENT)
Dept: GASTROENTEROLOGY | Facility: CLINIC | Age: 47
End: 2022-09-14
Payer: COMMERCIAL

## 2022-09-14 NOTE — TELEPHONE ENCOUNTER
Patient refused colonoscopy scheduling at this time she will call back when ready contact information sent to portal.

## 2022-09-16 LAB — BCS RECOMMENDATION EXT: NORMAL

## 2022-10-06 ENCOUNTER — OFFICE VISIT (OUTPATIENT)
Dept: DERMATOLOGY | Facility: CLINIC | Age: 47
End: 2022-10-06
Payer: COMMERCIAL

## 2022-10-06 DIAGNOSIS — L30.9 DERMATITIS: Primary | ICD-10-CM

## 2022-10-06 DIAGNOSIS — L81.9 HYPERPIGMENTATION: ICD-10-CM

## 2022-10-06 DIAGNOSIS — L40.9 PSORIASIS: ICD-10-CM

## 2022-10-06 DIAGNOSIS — L82.1 SEBORRHEIC KERATOSES: ICD-10-CM

## 2022-10-06 PROCEDURE — 1160F PR REVIEW ALL MEDS BY PRESCRIBER/CLIN PHARMACIST DOCUMENTED: ICD-10-PCS | Mod: CPTII,S$GLB,, | Performed by: STUDENT IN AN ORGANIZED HEALTH CARE EDUCATION/TRAINING PROGRAM

## 2022-10-06 PROCEDURE — 99203 PR OFFICE/OUTPT VISIT, NEW, LEVL III, 30-44 MIN: ICD-10-PCS | Mod: S$GLB,,, | Performed by: STUDENT IN AN ORGANIZED HEALTH CARE EDUCATION/TRAINING PROGRAM

## 2022-10-06 PROCEDURE — 99203 OFFICE O/P NEW LOW 30 MIN: CPT | Mod: S$GLB,,, | Performed by: STUDENT IN AN ORGANIZED HEALTH CARE EDUCATION/TRAINING PROGRAM

## 2022-10-06 PROCEDURE — 1159F PR MEDICATION LIST DOCUMENTED IN MEDICAL RECORD: ICD-10-PCS | Mod: CPTII,S$GLB,, | Performed by: STUDENT IN AN ORGANIZED HEALTH CARE EDUCATION/TRAINING PROGRAM

## 2022-10-06 PROCEDURE — 1159F MED LIST DOCD IN RCRD: CPT | Mod: CPTII,S$GLB,, | Performed by: STUDENT IN AN ORGANIZED HEALTH CARE EDUCATION/TRAINING PROGRAM

## 2022-10-06 PROCEDURE — 99999 PR PBB SHADOW E&M-EST. PATIENT-LVL III: CPT | Mod: PBBFAC,,, | Performed by: STUDENT IN AN ORGANIZED HEALTH CARE EDUCATION/TRAINING PROGRAM

## 2022-10-06 PROCEDURE — 99999 PR PBB SHADOW E&M-EST. PATIENT-LVL III: ICD-10-PCS | Mod: PBBFAC,,, | Performed by: STUDENT IN AN ORGANIZED HEALTH CARE EDUCATION/TRAINING PROGRAM

## 2022-10-06 PROCEDURE — 1160F RVW MEDS BY RX/DR IN RCRD: CPT | Mod: CPTII,S$GLB,, | Performed by: STUDENT IN AN ORGANIZED HEALTH CARE EDUCATION/TRAINING PROGRAM

## 2022-10-06 RX ORDER — CLOBETASOL PROPIONATE 0.5 MG/G
CREAM TOPICAL 2 TIMES DAILY
Qty: 60 G | Refills: 0 | Status: SHIPPED | OUTPATIENT
Start: 2022-10-06 | End: 2023-05-10

## 2022-10-06 RX ORDER — TRIAMCINOLONE ACETONIDE 0.25 MG/G
CREAM TOPICAL 2 TIMES DAILY
Qty: 15 G | Refills: 0 | Status: SHIPPED | OUTPATIENT
Start: 2022-10-06 | End: 2023-05-10

## 2022-10-06 NOTE — PATIENT INSTRUCTIONS
- [place] [facility] for laser hair    - clobetasol cream twice daily for right elbow for 2 weeks    - face- triamcinolone cream twice daily for 2 weeks

## 2022-10-06 NOTE — PROGRESS NOTES
Subjective:       Patient ID:  Preeti Garvey is a 47 y.o. female who presents for   Chief Complaint   Patient presents with    Spot     Above right eye, under chin, left shoulder     LOV: 9/27/17 [place]    Patient here today for spot above right eye x 1 week. Red. Uses OTC hydrocortisone cream.   Has hx of psoriasis. Follows with Dr. Carmona. She is taking otzela BID x >1 year, tolerating well. Still has single plaque on her right elbow.     Spot on left chest x years. Asymptomatic.     Complains of dark spots under right chin/ neck that first appeared after she plucked some hairs.   Wears sunscreen regularly.             Review of Systems   Constitutional:  Negative for fever and chills.   Respiratory:  Negative for cough and shortness of breath.    Gastrointestinal:  Negative for nausea and vomiting.   Skin:  Positive for itching, rash and dry skin.      Objective:    Physical Exam   Constitutional: She appears well-developed and well-nourished.   Neurological: She is alert and oriented to person, place, and time.   Psychiatric: She has a normal mood and affect.   Skin:   Areas Examined (abnormalities noted in diagram):   Head / Face Inspection Performed  Neck Inspection Performed  Chest / Axilla Inspection Performed  RUE Inspected            Diagram Legend     Erythematous scaling macule/papule c/w actinic keratosis       Vascular papule c/w angioma      Pigmented verrucoid papule/plaque c/w seborrheic keratosis      Yellow umbilicated papule c/w sebaceous hyperplasia      Irregularly shaped tan macule c/w lentigo     1-2 mm smooth white papules consistent with Milia      Movable subcutaneous cyst with punctum c/w epidermal inclusion cyst      Subcutaneous movable cyst c/w pilar cyst      Firm pink to brown papule c/w dermatofibroma      Pedunculated fleshy papule(s) c/w skin tag(s)      Evenly pigmented macule c/w junctional nevus     Mildly variegated pigmented, slightly irregular-bordered macule c/w  mildly atypical nevus      Flesh colored to evenly pigmented papule c/w intradermal nevus       Pink pearly papule/plaque c/w basal cell carcinoma      Erythematous hyperkeratotic cursted plaque c/w SCC      Surgical scar with no sign of skin cancer recurrence      Open and closed comedones      Inflammatory papules and pustules      Verrucoid papule consistent consistent with wart     Erythematous eczematous patches and plaques     Dystrophic onycholytic nail with subungual debris c/w onychomycosis     Umbilicated papule    Erythematous-base heme-crusted tan verrucoid plaque consistent with inflamed seborrheic keratosis     Erythematous Silvery Scaling Plaque c/w Psoriasis     See annotation            Assessment / Plan:        Dermatitis  -     triamcinolone acetonide 0.025% (KENALOG) 0.025 % cream; Apply topically 2 (two) times daily.  Dispense: 15 g; Refill: 0  Unclear if psoriasis, appears more eczematous  Use on AA BID x 2 weeks   Can use vaseline or aquaphor as well    Psoriasis  -     clobetasoL (TEMOVATE) 0.05 % cream; Apply topically 2 (two) times daily.  Dispense: 60 g; Refill: 0  Continue to f/u with Dr. Carmona for otzela BID  - clobetasol bid x 2 weeks for plaque    Seborrheic keratoses  These are benign inherited growths without a malignant potential. Reassurance given to patient. No treatment is necessary.     Hyperpigmentation  -secondary to plucking hairs, recommend [facility] w/ [place] [facility]           No follow-ups on file.

## 2022-10-11 ENCOUNTER — PATIENT MESSAGE (OUTPATIENT)
Dept: ADMINISTRATIVE | Facility: HOSPITAL | Age: 47
End: 2022-10-11
Payer: COMMERCIAL

## 2022-10-12 ENCOUNTER — PATIENT OUTREACH (OUTPATIENT)
Dept: ADMINISTRATIVE | Facility: HOSPITAL | Age: 47
End: 2022-10-12
Payer: COMMERCIAL

## 2022-12-14 DIAGNOSIS — D50.9 IRON DEFICIENCY ANEMIA, UNSPECIFIED IRON DEFICIENCY ANEMIA TYPE: ICD-10-CM

## 2022-12-14 RX ORDER — FERROUS SULFATE 325(65) MG
325 TABLET, DELAYED RELEASE (ENTERIC COATED) ORAL DAILY
Qty: 90 TABLET | Refills: 3 | Status: SHIPPED | OUTPATIENT
Start: 2022-12-14 | End: 2023-05-10

## 2022-12-14 NOTE — TELEPHONE ENCOUNTER
Refill Routing Note   Medication(s) are not appropriate for processing by [facility] for the following reason(s):      - Outside of protocol    [facility] action(s):  Route          Medication reconciliation completed: No     Appointments  past 12m or future 3m with PCP    Date Provider   Last Visit   9/13/2022 Carina He MD   Next Visit   3/13/2023 Carina He MD   ED visits in past 90 days: 0        Note composed:1:47 PM 12/14/2022

## 2022-12-14 NOTE — TELEPHONE ENCOUNTER
----- Message from Glen Michele, Patient Care Assistant sent at 12/14/2022  9:36 AM CST -----  Contact: Pt  Type:  RX Refill Request    Who Called:  Pt  Refill or New Rx:  Refill  RX Name and Strength:  Vitamin D  How is the patient currently taking it? (ex. 1XDay):  As Directed  Is this a 30 day or 90 day RX:  90  Preferred Pharmacy with phone number:    [id number] - [place], [place] - [address] AT [address]  [address]  [place] [address]  Phone: [phone] Fax: [phone]  Local or Mail Order:  local  Ordering Provider:  Neri Malloy Call Back Number:  [phone]  Additional Information:  Please contact pt upon completion-Thank you~

## 2023-01-03 ENCOUNTER — OFFICE VISIT (OUTPATIENT)
Dept: FAMILY MEDICINE | Facility: CLINIC | Age: 48
End: 2023-01-03
Payer: COMMERCIAL

## 2023-01-03 VITALS
WEIGHT: 150.13 LBS | BODY MASS INDEX: 27.63 KG/M2 | SYSTOLIC BLOOD PRESSURE: 130 MMHG | HEIGHT: 62 IN | OXYGEN SATURATION: 98 % | TEMPERATURE: 98 F | RESPIRATION RATE: 14 BRPM | DIASTOLIC BLOOD PRESSURE: 60 MMHG | HEART RATE: 64 BPM

## 2023-01-03 DIAGNOSIS — R05.9 COUGH: ICD-10-CM

## 2023-01-03 DIAGNOSIS — J06.9 UPPER RESPIRATORY TRACT INFECTION, UNSPECIFIED TYPE: Primary | ICD-10-CM

## 2023-01-03 LAB
INFLUENZA A, MOLECULAR: NEGATIVE
INFLUENZA B, MOLECULAR: NEGATIVE
SPECIMEN SOURCE: NORMAL

## 2023-01-03 PROCEDURE — 1159F PR MEDICATION LIST DOCUMENTED IN MEDICAL RECORD: ICD-10-PCS | Mod: CPTII,S$GLB,, | Performed by: FAMILY MEDICINE

## 2023-01-03 PROCEDURE — 1159F MED LIST DOCD IN RCRD: CPT | Mod: CPTII,S$GLB,, | Performed by: FAMILY MEDICINE

## 2023-01-03 PROCEDURE — 99213 PR OFFICE/OUTPT VISIT, EST, LEVL III, 20-29 MIN: ICD-10-PCS | Mod: S$GLB,,, | Performed by: FAMILY MEDICINE

## 2023-01-03 PROCEDURE — 3008F BODY MASS INDEX DOCD: CPT | Mod: CPTII,S$GLB,, | Performed by: FAMILY MEDICINE

## 2023-01-03 PROCEDURE — 3008F PR BODY MASS INDEX (BMI) DOCUMENTED: ICD-10-PCS | Mod: CPTII,S$GLB,, | Performed by: FAMILY MEDICINE

## 2023-01-03 PROCEDURE — 87502 INFLUENZA DNA AMP PROBE: CPT | Mod: PO | Performed by: FAMILY MEDICINE

## 2023-01-03 PROCEDURE — U0005 INFEC AGEN DETEC AMPLI PROBE: HCPCS | Performed by: FAMILY MEDICINE

## 2023-01-03 PROCEDURE — 3075F SYST BP GE 130 - 139MM HG: CPT | Mod: CPTII,S$GLB,, | Performed by: FAMILY MEDICINE

## 2023-01-03 PROCEDURE — 3075F PR MOST RECENT SYSTOLIC BLOOD PRESS GE 130-139MM HG: ICD-10-PCS | Mod: CPTII,S$GLB,, | Performed by: FAMILY MEDICINE

## 2023-01-03 PROCEDURE — U0003 INFECTIOUS AGENT DETECTION BY NUCLEIC ACID (DNA OR RNA); SEVERE ACUTE RESPIRATORY SYNDROME CORONAVIRUS 2 (SARS-COV-2) (CORONAVIRUS DISEASE [COVID-19]), AMPLIFIED PROBE TECHNIQUE, MAKING USE OF HIGH THROUGHPUT TECHNOLOGIES AS DESCRIBED BY CMS-2020-01-R: HCPCS | Performed by: FAMILY MEDICINE

## 2023-01-03 PROCEDURE — 99213 OFFICE O/P EST LOW 20 MIN: CPT | Mod: S$GLB,,, | Performed by: FAMILY MEDICINE

## 2023-01-03 PROCEDURE — 99999 PR PBB SHADOW E&M-EST. PATIENT-LVL III: CPT | Mod: PBBFAC,,, | Performed by: FAMILY MEDICINE

## 2023-01-03 PROCEDURE — 1160F RVW MEDS BY RX/DR IN RCRD: CPT | Mod: CPTII,S$GLB,, | Performed by: FAMILY MEDICINE

## 2023-01-03 PROCEDURE — 3078F DIAST BP <80 MM HG: CPT | Mod: CPTII,S$GLB,, | Performed by: FAMILY MEDICINE

## 2023-01-03 PROCEDURE — 3078F PR MOST RECENT DIASTOLIC BLOOD PRESSURE < 80 MM HG: ICD-10-PCS | Mod: CPTII,S$GLB,, | Performed by: FAMILY MEDICINE

## 2023-01-03 PROCEDURE — 1160F PR REVIEW ALL MEDS BY PRESCRIBER/CLIN PHARMACIST DOCUMENTED: ICD-10-PCS | Mod: CPTII,S$GLB,, | Performed by: FAMILY MEDICINE

## 2023-01-03 PROCEDURE — 99999 PR PBB SHADOW E&M-EST. PATIENT-LVL III: ICD-10-PCS | Mod: PBBFAC,,, | Performed by: FAMILY MEDICINE

## 2023-01-03 RX ORDER — PROMETHAZINE HYDROCHLORIDE AND DEXTROMETHORPHAN HYDROBROMIDE 6.25; 15 MG/5ML; MG/5ML
5 SYRUP ORAL EVERY 4 HOURS PRN
Qty: 180 ML | Refills: 0 | Status: SHIPPED | OUTPATIENT
Start: 2023-01-03 | End: 2023-01-13

## 2023-01-03 NOTE — PROGRESS NOTES
Subjective:       Patient ID: Preeti Garvey is a 47 y.o. female.    Chief Complaint: Nasal Congestion    HPI  Review of Systems   Constitutional:  Positive for fatigue. Negative for chills and fever.   HENT:  Positive for congestion, postnasal drip, rhinorrhea, sneezing and sore throat. Negative for ear discharge, ear pain and sinus pressure.    Respiratory:  Positive for cough. Negative for shortness of breath and wheezing.      Patient Active Problem List   Diagnosis    Allergic rhinitis due to pollen    Muscle spasm    Psoriasis    Iron deficiency anemia     Patient is here for a problem visit  Uri x 2 days. No fever.  Cough and nasal congestion- thick clear d/c.  Mild st and HA.    Objective:      Physical Exam  Vitals and nursing note reviewed.   Constitutional:       Appearance: She is well-developed.   HENT:      Right Ear: Tympanic membrane and ear canal normal.      Left Ear: Tympanic membrane and ear canal normal.      Nose: Mucosal edema and rhinorrhea present.      Right Sinus: No maxillary sinus tenderness or frontal sinus tenderness.      Left Sinus: No maxillary sinus tenderness or frontal sinus tenderness.      Mouth/Throat:      Pharynx: Posterior oropharyngeal erythema present. No oropharyngeal exudate.      Tonsils: No tonsillar abscesses.   Cardiovascular:      Rate and Rhythm: Normal rate and regular rhythm.      Heart sounds: Normal heart sounds.   Pulmonary:      Effort: Pulmonary effort is normal.      Breath sounds: Normal breath sounds.   Skin:     General: Skin is warm and dry.       Assessment:       1. Upper respiratory tract infection, unspecified type        Plan:       1. Upper respiratory tract infection, unspecified type  General home care guidelines for cough and congestion:increase fluid intake, get plenty of rest, and use OTC cough and cold medications for relief of symptoms.  I recommended guafenesin for congestion and dextromethorphan as directed for cough.  Brands like  Mucinex DM or Chloricidin HBP or similar are recommended.  Avoidance of decongestants is recommended for patients with heart problems and hypertension.  Extra vitamin C may also benefit.  Return to clinic if symptoms last longer than 10 days or sooner if worsen with symptoms like fever > 100.4, severe sinus pain or headache, thick yellow nasal discharge or sputum, dehydration, sudden confusion or mental status changes, shortness of breath, labored breathing, or lethargy. Anti-fever medications can be alternated like OTC ibuprofen or tylenol as needed and as directed unless told to avoid these by your doctor.   Screen and treat as indicated:    - COVID-19 Routine Screening; Future  - Influenza A & B by Molecular  - promethazine-dextromethorphan (PROMETHAZINE-DM) 6.25-15 mg/5 mL Syrp; Take 5 mLs by mouth every 4 (four) hours as needed (cough).  Dispense: 180 mL; Refill: 0

## 2023-01-04 LAB
SARS-COV-2 RNA RESP QL NAA+PROBE: NOT DETECTED
SARS-COV-2- CYCLE NUMBER: NORMAL

## 2023-04-03 ENCOUNTER — TELEPHONE (OUTPATIENT)
Dept: DERMATOLOGY | Facility: CLINIC | Age: 48
End: 2023-04-03
Payer: COMMERCIAL

## 2023-04-03 NOTE — TELEPHONE ENCOUNTER
----- Message from Annalisa Chow sent at 4/3/2023  8:15 AM CDT -----  Contact: Self  Type:  Same Day Appointment Request    Caller is requesting a same day appointment.  Caller declined first available appointment listed below.      Name of Caller:  Patient  When is the first available appointment?  07/05  Symptoms:  cyst under breast where bra rubs that hurts and feels it may be infected  Best Call Back Number:  [phone]  Additional Information:   Thank You.

## 2023-04-03 NOTE — TELEPHONE ENCOUNTER
"RN Review of Medical History / Physical Health Screen  [facility]      CLIENT'S NAME: Dash Arizmendi  MRN:   [id number]  :   1966 AGE:52 year old SEX: male    DATE OF DIAGNOSTIC ASSESSMENT: 10/09/2018  DATE OF ADMISSION: 10/11/2018  PROGRAM: [facility] ()      Following admission, the RN reviewed the following:    - Medical History / Physical Health Screen completed during the DA noted above.  - Immediate Health Concerns as noted on the Initial Individual Treatment Plan.     10/11/18 0910   Height and Weight   Height 5' 7\" (1.702 m)   Height Method Stated   Weight 206 lb (93.4 kg)   Weight Method Standing scale   BSA (Calculated - sq m) 2.1   BMI (Calculated) 32.33     BMI Review:  Was the patient informed of BMI? yes      Findings Above,  General nutrition education       RN Recommendations include: General nutrition education provided. Will continue to monitor weight and if additional weight gain is measured, will refer to primary provider for follow-up care.    Rachel Muniz RN         " Pt contacted and requested to be seen today. Notified here that there are no available appts today but did offer her an appt on another day.  She stated that she will call the [place] office.

## 2023-04-11 ENCOUNTER — PATIENT MESSAGE (OUTPATIENT)
Dept: ADMINISTRATIVE | Facility: HOSPITAL | Age: 48
End: 2023-04-11
Payer: COMMERCIAL

## 2023-05-05 ENCOUNTER — LAB VISIT (OUTPATIENT)
Dept: LAB | Facility: HOSPITAL | Age: 48
End: 2023-05-05
Payer: COMMERCIAL

## 2023-05-05 DIAGNOSIS — E55.9 VITAMIN D DEFICIENCY: ICD-10-CM

## 2023-05-05 DIAGNOSIS — E53.8 B12 DEFICIENCY: ICD-10-CM

## 2023-05-05 DIAGNOSIS — D50.9 IRON DEFICIENCY ANEMIA, UNSPECIFIED IRON DEFICIENCY ANEMIA TYPE: ICD-10-CM

## 2023-05-05 DIAGNOSIS — Z00.00 ANNUAL PHYSICAL EXAM: ICD-10-CM

## 2023-05-05 LAB
25(OH)D3+25(OH)D2 SERPL-MCNC: 28 NG/ML (ref 30–96)
ALBUMIN SERPL BCP-MCNC: 3.7 G/DL (ref 3.5–5.2)
ALP SERPL-CCNC: 92 U/L (ref 55–135)
ALT SERPL W/O P-5'-P-CCNC: 20 U/L (ref 10–44)
ANION GAP SERPL CALC-SCNC: 9 MMOL/L (ref 8–16)
AST SERPL-CCNC: 17 U/L (ref 10–40)
BASOPHILS # BLD AUTO: 0.03 K/UL (ref 0–0.2)
BASOPHILS NFR BLD: 0.6 % (ref 0–1.9)
BILIRUB SERPL-MCNC: 0.3 MG/DL (ref 0.1–1)
BUN SERPL-MCNC: 10 MG/DL (ref 6–20)
CALCIUM SERPL-MCNC: 9.7 MG/DL (ref 8.7–10.5)
CHLORIDE SERPL-SCNC: 104 MMOL/L (ref 95–110)
CHOLEST SERPL-MCNC: 173 MG/DL (ref 120–199)
CHOLEST/HDLC SERPL: 3.7 {RATIO} (ref 2–5)
CO2 SERPL-SCNC: 25 MMOL/L (ref 23–29)
CREAT SERPL-MCNC: 0.7 MG/DL (ref 0.5–1.4)
DIFFERENTIAL METHOD: ABNORMAL
EOSINOPHIL # BLD AUTO: 0.3 K/UL (ref 0–0.5)
EOSINOPHIL NFR BLD: 5.2 % (ref 0–8)
ERYTHROCYTE [DISTWIDTH] IN BLOOD BY AUTOMATED COUNT: 12.1 % (ref 11.5–14.5)
EST. GFR  (NO RACE VARIABLE): >60 ML/MIN/1.73 M^2
FERRITIN SERPL-MCNC: 11 NG/ML (ref 20–300)
GLUCOSE SERPL-MCNC: 90 MG/DL (ref 70–110)
HCT VFR BLD AUTO: 42.9 % (ref 37–48.5)
HDLC SERPL-MCNC: 47 MG/DL (ref 40–75)
HDLC SERPL: 27.2 % (ref 20–50)
HGB BLD-MCNC: 13.1 G/DL (ref 12–16)
IMM GRANULOCYTES # BLD AUTO: 0.01 K/UL (ref 0–0.04)
IMM GRANULOCYTES NFR BLD AUTO: 0.2 % (ref 0–0.5)
IRON SERPL-MCNC: 52 UG/DL (ref 30–160)
LDLC SERPL CALC-MCNC: 99.8 MG/DL (ref 63–159)
LYMPHOCYTES # BLD AUTO: 1.5 K/UL (ref 1–4.8)
LYMPHOCYTES NFR BLD: 28.3 % (ref 18–48)
MCH RBC QN AUTO: 27 PG (ref 27–31)
MCHC RBC AUTO-ENTMCNC: 30.5 G/DL (ref 32–36)
MCV RBC AUTO: 89 FL (ref 82–98)
MONOCYTES # BLD AUTO: 0.5 K/UL (ref 0.3–1)
MONOCYTES NFR BLD: 9.1 % (ref 4–15)
NEUTROPHILS # BLD AUTO: 3.1 K/UL (ref 1.8–7.7)
NEUTROPHILS NFR BLD: 56.6 % (ref 38–73)
NONHDLC SERPL-MCNC: 126 MG/DL
NRBC BLD-RTO: 0 /100 WBC
PLATELET # BLD AUTO: 363 K/UL (ref 150–450)
PMV BLD AUTO: 9.3 FL (ref 9.2–12.9)
POTASSIUM SERPL-SCNC: 4.4 MMOL/L (ref 3.5–5.1)
PROT SERPL-MCNC: 7.3 G/DL (ref 6–8.4)
RBC # BLD AUTO: 4.85 M/UL (ref 4–5.4)
SATURATED IRON: 12 % (ref 20–50)
SODIUM SERPL-SCNC: 138 MMOL/L (ref 136–145)
TOTAL IRON BINDING CAPACITY: 429 UG/DL (ref 250–450)
TRANSFERRIN SERPL-MCNC: 290 MG/DL (ref 200–375)
TRIGL SERPL-MCNC: 131 MG/DL (ref 30–150)
VIT B12 SERPL-MCNC: 1181 PG/ML (ref 210–950)
WBC # BLD AUTO: 5.41 K/UL (ref 3.9–12.7)

## 2023-05-05 PROCEDURE — 80061 LIPID PANEL: CPT | Performed by: FAMILY MEDICINE

## 2023-05-05 PROCEDURE — 82728 ASSAY OF FERRITIN: CPT | Performed by: FAMILY MEDICINE

## 2023-05-05 PROCEDURE — 36415 COLL VENOUS BLD VENIPUNCTURE: CPT | Mod: PO | Performed by: FAMILY MEDICINE

## 2023-05-05 PROCEDURE — 85025 COMPLETE CBC W/AUTO DIFF WBC: CPT | Performed by: FAMILY MEDICINE

## 2023-05-05 PROCEDURE — 80053 COMPREHEN METABOLIC PANEL: CPT | Performed by: FAMILY MEDICINE

## 2023-05-05 PROCEDURE — 84466 ASSAY OF TRANSFERRIN: CPT | Performed by: FAMILY MEDICINE

## 2023-05-05 PROCEDURE — 82306 VITAMIN D 25 HYDROXY: CPT | Performed by: FAMILY MEDICINE

## 2023-05-05 PROCEDURE — 82607 VITAMIN B-12: CPT | Performed by: FAMILY MEDICINE

## 2023-05-10 ENCOUNTER — OFFICE VISIT (OUTPATIENT)
Dept: FAMILY MEDICINE | Facility: CLINIC | Age: 48
End: 2023-05-10
Payer: COMMERCIAL

## 2023-05-10 VITALS
HEIGHT: 62 IN | TEMPERATURE: 98 F | SYSTOLIC BLOOD PRESSURE: 116 MMHG | DIASTOLIC BLOOD PRESSURE: 76 MMHG | WEIGHT: 151.44 LBS | OXYGEN SATURATION: 96 % | BODY MASS INDEX: 27.87 KG/M2 | HEART RATE: 92 BPM

## 2023-05-10 DIAGNOSIS — E55.9 VITAMIN D DEFICIENCY: ICD-10-CM

## 2023-05-10 DIAGNOSIS — D50.9 IRON DEFICIENCY ANEMIA, UNSPECIFIED IRON DEFICIENCY ANEMIA TYPE: Primary | ICD-10-CM

## 2023-05-10 DIAGNOSIS — Z12.11 COLON CANCER SCREENING: ICD-10-CM

## 2023-05-10 DIAGNOSIS — L40.9 PSORIASIS: ICD-10-CM

## 2023-05-10 DIAGNOSIS — N93.8 DUB (DYSFUNCTIONAL UTERINE BLEEDING): ICD-10-CM

## 2023-05-10 DIAGNOSIS — Z13.1 DIABETES MELLITUS SCREENING: ICD-10-CM

## 2023-05-10 PROCEDURE — 3074F SYST BP LT 130 MM HG: CPT | Mod: CPTII,S$GLB,, | Performed by: FAMILY MEDICINE

## 2023-05-10 PROCEDURE — 1160F RVW MEDS BY RX/DR IN RCRD: CPT | Mod: CPTII,S$GLB,, | Performed by: FAMILY MEDICINE

## 2023-05-10 PROCEDURE — 3078F DIAST BP <80 MM HG: CPT | Mod: CPTII,S$GLB,, | Performed by: FAMILY MEDICINE

## 2023-05-10 PROCEDURE — 3074F PR MOST RECENT SYSTOLIC BLOOD PRESSURE < 130 MM HG: ICD-10-PCS | Mod: CPTII,S$GLB,, | Performed by: FAMILY MEDICINE

## 2023-05-10 PROCEDURE — 99999 PR PBB SHADOW E&M-EST. PATIENT-LVL III: ICD-10-PCS | Mod: PBBFAC,,, | Performed by: FAMILY MEDICINE

## 2023-05-10 PROCEDURE — 3078F PR MOST RECENT DIASTOLIC BLOOD PRESSURE < 80 MM HG: ICD-10-PCS | Mod: CPTII,S$GLB,, | Performed by: FAMILY MEDICINE

## 2023-05-10 PROCEDURE — 99999 PR PBB SHADOW E&M-EST. PATIENT-LVL III: CPT | Mod: PBBFAC,,, | Performed by: FAMILY MEDICINE

## 2023-05-10 PROCEDURE — 99214 PR OFFICE/OUTPT VISIT, EST, LEVL IV, 30-39 MIN: ICD-10-PCS | Mod: S$GLB,,, | Performed by: FAMILY MEDICINE

## 2023-05-10 PROCEDURE — 3008F BODY MASS INDEX DOCD: CPT | Mod: CPTII,S$GLB,, | Performed by: FAMILY MEDICINE

## 2023-05-10 PROCEDURE — 3008F PR BODY MASS INDEX (BMI) DOCUMENTED: ICD-10-PCS | Mod: CPTII,S$GLB,, | Performed by: FAMILY MEDICINE

## 2023-05-10 PROCEDURE — 1159F PR MEDICATION LIST DOCUMENTED IN MEDICAL RECORD: ICD-10-PCS | Mod: CPTII,S$GLB,, | Performed by: FAMILY MEDICINE

## 2023-05-10 PROCEDURE — 1159F MED LIST DOCD IN RCRD: CPT | Mod: CPTII,S$GLB,, | Performed by: FAMILY MEDICINE

## 2023-05-10 PROCEDURE — 99214 OFFICE O/P EST MOD 30 MIN: CPT | Mod: S$GLB,,, | Performed by: FAMILY MEDICINE

## 2023-05-10 PROCEDURE — 1160F PR REVIEW ALL MEDS BY PRESCRIBER/CLIN PHARMACIST DOCUMENTED: ICD-10-PCS | Mod: CPTII,S$GLB,, | Performed by: FAMILY MEDICINE

## 2023-05-10 RX ORDER — DESONIDE 0.5 MG/G
CREAM TOPICAL
COMMUNITY
Start: 2023-04-25

## 2023-05-10 RX ORDER — FERROUS SULFATE 324(65)MG
324 TABLET, DELAYED RELEASE (ENTERIC COATED) ORAL DAILY
Qty: 90 TABLET | Refills: 3 | Status: SHIPPED | OUTPATIENT
Start: 2023-05-10

## 2023-05-10 RX ORDER — ERGOCALCIFEROL 1.25 MG/1
50000 CAPSULE ORAL
Qty: 12 CAPSULE | Refills: 1 | Status: SHIPPED | OUTPATIENT
Start: 2023-05-10

## 2023-05-10 NOTE — PROGRESS NOTES
Subjective:       Patient ID: Preeti Garvey is a 48 y.o. female.    Chief Complaint: Annual Exam    HPI  Review of Systems   Constitutional:  Negative for fatigue and unexpected weight change.   Respiratory:  Negative for chest tightness and shortness of breath.    Cardiovascular:  Negative for chest pain, palpitations and leg swelling.   Gastrointestinal:  Negative for abdominal pain.   Musculoskeletal:  Negative for arthralgias.   Neurological:  Negative for dizziness, syncope, light-headedness and headaches.     Patient Active Problem List   Diagnosis    Allergic rhinitis due to pollen    Muscle spasm    Psoriasis    Iron deficiency anemia     Patient is here for a chronic conditions follow up.    Reviewed labs 5/23-low vit d, low ferritin. Has not been on any supplements 3 months      GYN [facility] following for dysmenorrhea and heavy bleeding.  Has iron def anemia  Dr. Carlos Sanchez utd pap and mammo 9/22. Takes progesterone for 10 days for very heavy periods which are improving on their own. Last dose 2 months ago     On otezla for psoriasis Derm Peggy Carmona          TDap UTD 2017  Objective:      Physical Exam  Vitals and nursing note reviewed.   Constitutional:       Appearance: She is well-developed.   Cardiovascular:      Rate and Rhythm: Normal rate and regular rhythm.      Heart sounds: Normal heart sounds.   Pulmonary:      Effort: Pulmonary effort is normal.      Breath sounds: Normal breath sounds.   Skin:     General: Skin is warm and dry.   Neurological:      Mental Status: She is alert and oriented to person, place, and time.       Assessment:       1. Iron deficiency anemia, unspecified iron deficiency anemia type    2. Psoriasis    3. Vitamin D deficiency    4. DUB (dysfunctional uterine bleeding)    5. Diabetes mellitus screening    6. Colon cancer screening        Plan:         1. Iron deficiency anemia, unspecified iron deficiency anemia type  Restart and monitor  - ferrous sulfate  324 mg (65 mg iron) TbEC; Take 1 tablet (324 mg total) by mouth once daily.  Dispense: 90 tablet; Refill: 3  - CBC Auto Differential; Future  - Ferritin; Future  - Iron and TIBC; Future    2. Psoriasis  Cont current mgmt    3. Vitamin D deficiency  Add and monitor  - ergocalciferol (ERGOCALCIFEROL) 50,000 unit Cap; Take 1 capsule (50,000 Units total) by mouth every 7 days.  Dispense: 12 capsule; Refill: 1  - Comprehensive Metabolic Panel; Future  - Vitamin D; Future    4. DUB (dysfunctional uterine bleeding)  Cont gyn care and mgmt    5. Diabetes mellitus screening  Screen and treat as indicated:    - Hemoglobin A1C; Future    6. Colon cancer screening  Refer for screening colonoscopy  - Case Request Endoscopy: COLONOSCOPY      Time spent with patient: 20 minutes    Patient with be reevaluated in 6 months or sooner prn    Greater than 50% of this visit was spent counseling as described in above documentation:Yes

## 2023-05-12 ENCOUNTER — PATIENT MESSAGE (OUTPATIENT)
Dept: GASTROENTEROLOGY | Facility: CLINIC | Age: 48
End: 2023-05-12
Payer: COMMERCIAL

## 2023-05-24 ENCOUNTER — TELEPHONE (OUTPATIENT)
Dept: GASTROENTEROLOGY | Facility: CLINIC | Age: 48
End: 2023-05-24
Payer: COMMERCIAL

## 2023-08-28 DIAGNOSIS — D50.9 IRON DEFICIENCY ANEMIA, UNSPECIFIED IRON DEFICIENCY ANEMIA TYPE: ICD-10-CM

## 2023-08-28 RX ORDER — FERROUS SULFATE 325(65) MG
TABLET, DELAYED RELEASE (ENTERIC COATED) ORAL
Qty: 90 TABLET | Refills: 3 | Status: SHIPPED | OUTPATIENT
Start: 2023-08-28

## 2023-10-18 ENCOUNTER — PATIENT MESSAGE (OUTPATIENT)
Dept: GASTROENTEROLOGY | Facility: CLINIC | Age: 48
End: 2023-10-18
Payer: COMMERCIAL

## 2024-04-08 ENCOUNTER — TELEPHONE (OUTPATIENT)
Dept: FAMILY MEDICINE | Facility: CLINIC | Age: 49
End: 2024-04-08
Payer: COMMERCIAL

## 2024-04-08 DIAGNOSIS — Z00.00 ANNUAL PHYSICAL EXAM: Primary | ICD-10-CM

## 2024-04-08 NOTE — TELEPHONE ENCOUNTER
----- Message from Constance Meza sent at 4/8/2024 10:36 AM CDT -----  Contact: self  Type:  Needs Medical Advice    Who Called: self  Symptoms (please be specific): pt is requesting labs be put in her chart to schedule. Pt sts she wants a full panel. Wants to check cholesterol. Pts appt with  is on Friday wants labs to be Wednesday.      Would the patient rather a call back or a response via [facility]? call  Best Call Back Number: [phone] (home)     Additional Information: please advise and thank you.

## 2024-04-09 ENCOUNTER — LAB VISIT (OUTPATIENT)
Dept: LAB | Facility: HOSPITAL | Age: 49
End: 2024-04-09
Attending: FAMILY MEDICINE
Payer: COMMERCIAL

## 2024-04-09 DIAGNOSIS — D50.9 IRON DEFICIENCY ANEMIA, UNSPECIFIED IRON DEFICIENCY ANEMIA TYPE: ICD-10-CM

## 2024-04-09 DIAGNOSIS — E55.9 VITAMIN D DEFICIENCY: ICD-10-CM

## 2024-04-09 DIAGNOSIS — Z00.00 ANNUAL PHYSICAL EXAM: ICD-10-CM

## 2024-04-09 DIAGNOSIS — Z13.1 DIABETES MELLITUS SCREENING: ICD-10-CM

## 2024-04-09 LAB
25(OH)D3+25(OH)D2 SERPL-MCNC: 26 NG/ML (ref 30–96)
ALBUMIN SERPL BCP-MCNC: 3.6 G/DL (ref 3.5–5.2)
ALP SERPL-CCNC: 96 U/L (ref 55–135)
ALT SERPL W/O P-5'-P-CCNC: 12 U/L (ref 10–44)
ANION GAP SERPL CALC-SCNC: 10 MMOL/L (ref 8–16)
AST SERPL-CCNC: 15 U/L (ref 10–40)
BASOPHILS # BLD AUTO: 0.05 K/UL (ref 0–0.2)
BASOPHILS NFR BLD: 0.8 % (ref 0–1.9)
BILIRUB SERPL-MCNC: 0.3 MG/DL (ref 0.1–1)
BUN SERPL-MCNC: 9 MG/DL (ref 6–20)
CALCIUM SERPL-MCNC: 10.1 MG/DL (ref 8.7–10.5)
CHLORIDE SERPL-SCNC: 105 MMOL/L (ref 95–110)
CHOLEST SERPL-MCNC: 173 MG/DL (ref 120–199)
CHOLEST/HDLC SERPL: 3.8 {RATIO} (ref 2–5)
CO2 SERPL-SCNC: 21 MMOL/L (ref 23–29)
CREAT SERPL-MCNC: 0.7 MG/DL (ref 0.5–1.4)
DIFFERENTIAL METHOD BLD: ABNORMAL
EOSINOPHIL # BLD AUTO: 0.3 K/UL (ref 0–0.5)
EOSINOPHIL NFR BLD: 4.9 % (ref 0–8)
ERYTHROCYTE [DISTWIDTH] IN BLOOD BY AUTOMATED COUNT: 13.8 % (ref 11.5–14.5)
EST. GFR  (NO RACE VARIABLE): >60 ML/MIN/1.73 M^2
ESTIMATED AVG GLUCOSE: 117 MG/DL (ref 68–131)
FERRITIN SERPL-MCNC: 8 NG/ML (ref 20–300)
GLUCOSE SERPL-MCNC: 90 MG/DL (ref 70–110)
HBA1C MFR BLD: 5.7 % (ref 4–5.6)
HCT VFR BLD AUTO: 38.8 % (ref 37–48.5)
HDLC SERPL-MCNC: 46 MG/DL (ref 40–75)
HDLC SERPL: 26.6 % (ref 20–50)
HGB BLD-MCNC: 11.7 G/DL (ref 12–16)
IMM GRANULOCYTES # BLD AUTO: 0.01 K/UL (ref 0–0.04)
IMM GRANULOCYTES NFR BLD AUTO: 0.2 % (ref 0–0.5)
IRON SERPL-MCNC: 62 UG/DL (ref 30–160)
LDLC SERPL CALC-MCNC: 102.6 MG/DL (ref 63–159)
LYMPHOCYTES # BLD AUTO: 1.7 K/UL (ref 1–4.8)
LYMPHOCYTES NFR BLD: 27.9 % (ref 18–48)
MCH RBC QN AUTO: 24.2 PG (ref 27–31)
MCHC RBC AUTO-ENTMCNC: 30.2 G/DL (ref 32–36)
MCV RBC AUTO: 80 FL (ref 82–98)
MONOCYTES # BLD AUTO: 0.6 K/UL (ref 0.3–1)
MONOCYTES NFR BLD: 9.2 % (ref 4–15)
NEUTROPHILS # BLD AUTO: 3.5 K/UL (ref 1.8–7.7)
NEUTROPHILS NFR BLD: 57 % (ref 38–73)
NONHDLC SERPL-MCNC: 127 MG/DL
NRBC BLD-RTO: 0 /100 WBC
PLATELET # BLD AUTO: 427 K/UL (ref 150–450)
PMV BLD AUTO: 9.1 FL (ref 9.2–12.9)
POTASSIUM SERPL-SCNC: 3.9 MMOL/L (ref 3.5–5.1)
PROT SERPL-MCNC: 7.4 G/DL (ref 6–8.4)
RBC # BLD AUTO: 4.83 M/UL (ref 4–5.4)
SATURATED IRON: 13 % (ref 20–50)
SODIUM SERPL-SCNC: 136 MMOL/L (ref 136–145)
TOTAL IRON BINDING CAPACITY: 465 UG/DL (ref 250–450)
TRANSFERRIN SERPL-MCNC: 314 MG/DL (ref 200–375)
TRIGL SERPL-MCNC: 122 MG/DL (ref 30–150)
WBC # BLD AUTO: 6.09 K/UL (ref 3.9–12.7)

## 2024-04-09 PROCEDURE — 83540 ASSAY OF IRON: CPT | Performed by: FAMILY MEDICINE

## 2024-04-09 PROCEDURE — 80053 COMPREHEN METABOLIC PANEL: CPT | Performed by: FAMILY MEDICINE

## 2024-04-09 PROCEDURE — 82306 VITAMIN D 25 HYDROXY: CPT | Performed by: FAMILY MEDICINE

## 2024-04-09 PROCEDURE — 82728 ASSAY OF FERRITIN: CPT | Performed by: FAMILY MEDICINE

## 2024-04-09 PROCEDURE — 85025 COMPLETE CBC W/AUTO DIFF WBC: CPT | Performed by: FAMILY MEDICINE

## 2024-04-09 PROCEDURE — 36415 COLL VENOUS BLD VENIPUNCTURE: CPT | Mod: PO | Performed by: FAMILY MEDICINE

## 2024-04-09 PROCEDURE — 80061 LIPID PANEL: CPT | Performed by: FAMILY MEDICINE

## 2024-04-09 PROCEDURE — 83036 HEMOGLOBIN GLYCOSYLATED A1C: CPT | Performed by: FAMILY MEDICINE

## 2024-04-11 ENCOUNTER — TELEPHONE (OUTPATIENT)
Dept: FAMILY MEDICINE | Facility: CLINIC | Age: 49
End: 2024-04-11
Payer: COMMERCIAL

## 2024-04-11 NOTE — TELEPHONE ENCOUNTER
Spoke with pt. Informed pt of address change to [address] for appt tomorrow. Pt MIROSLAVA.

## 2024-04-11 NOTE — TELEPHONE ENCOUNTER
----- Message from Rylee Sandoval sent at 4/11/2024  4:29 PM CDT -----  Type:  Patient Returning Call    Who Called:pt     Who Left Message for Patient:LOMBARDDAO    Does the patient know what this is regarding?:yes     Would the patient rather a call back or a response via [facility]? Callback     Best Call Back Number:[phone] (home)       Additional Information: please call pt to let her the new address for her appt  please advise thank you

## 2024-04-12 ENCOUNTER — OFFICE VISIT (OUTPATIENT)
Dept: FAMILY MEDICINE | Facility: CLINIC | Age: 49
End: 2024-04-12
Payer: COMMERCIAL

## 2024-04-12 VITALS
HEIGHT: 62 IN | WEIGHT: 145.31 LBS | RESPIRATION RATE: 12 BRPM | OXYGEN SATURATION: 98 % | HEART RATE: 87 BPM | SYSTOLIC BLOOD PRESSURE: 112 MMHG | BODY MASS INDEX: 26.74 KG/M2 | DIASTOLIC BLOOD PRESSURE: 80 MMHG

## 2024-04-12 DIAGNOSIS — Z12.11 COLON CANCER SCREENING: ICD-10-CM

## 2024-04-12 DIAGNOSIS — Z00.00 ANNUAL PHYSICAL EXAM: Primary | ICD-10-CM

## 2024-04-12 DIAGNOSIS — D50.9 IRON DEFICIENCY ANEMIA, UNSPECIFIED IRON DEFICIENCY ANEMIA TYPE: ICD-10-CM

## 2024-04-12 DIAGNOSIS — E55.9 VITAMIN D DEFICIENCY: ICD-10-CM

## 2024-04-12 DIAGNOSIS — D84.821 DRUG-INDUCED IMMUNODEFICIENCY: ICD-10-CM

## 2024-04-12 DIAGNOSIS — Z79.899 DRUG-INDUCED IMMUNODEFICIENCY: ICD-10-CM

## 2024-04-12 DIAGNOSIS — Z12.31 ENCOUNTER FOR SCREENING MAMMOGRAM FOR MALIGNANT NEOPLASM OF BREAST: ICD-10-CM

## 2024-04-12 DIAGNOSIS — N93.8 DUB (DYSFUNCTIONAL UTERINE BLEEDING): ICD-10-CM

## 2024-04-12 PROCEDURE — 3079F DIAST BP 80-89 MM HG: CPT | Mod: CPTII,S$GLB,, | Performed by: FAMILY MEDICINE

## 2024-04-12 PROCEDURE — 3008F BODY MASS INDEX DOCD: CPT | Mod: CPTII,S$GLB,, | Performed by: FAMILY MEDICINE

## 2024-04-12 PROCEDURE — 3044F HG A1C LEVEL LT 7.0%: CPT | Mod: CPTII,S$GLB,, | Performed by: FAMILY MEDICINE

## 2024-04-12 PROCEDURE — 1160F RVW MEDS BY RX/DR IN RCRD: CPT | Mod: CPTII,S$GLB,, | Performed by: FAMILY MEDICINE

## 2024-04-12 PROCEDURE — 3074F SYST BP LT 130 MM HG: CPT | Mod: CPTII,S$GLB,, | Performed by: FAMILY MEDICINE

## 2024-04-12 PROCEDURE — 1159F MED LIST DOCD IN RCRD: CPT | Mod: CPTII,S$GLB,, | Performed by: FAMILY MEDICINE

## 2024-04-12 PROCEDURE — 99396 PREV VISIT EST AGE 40-64: CPT | Mod: S$GLB,,, | Performed by: FAMILY MEDICINE

## 2024-04-12 PROCEDURE — 99999 PR PBB SHADOW E&M-EST. PATIENT-LVL IV: CPT | Mod: PBBFAC,,, | Performed by: FAMILY MEDICINE

## 2024-04-12 RX ORDER — FERROUS SULFATE 324(65)MG
324 TABLET, DELAYED RELEASE (ENTERIC COATED) ORAL DAILY
Qty: 90 TABLET | Refills: 3 | Status: SHIPPED | OUTPATIENT
Start: 2024-04-12

## 2024-04-12 RX ORDER — ERGOCALCIFEROL 1.25 MG/1
50000 CAPSULE ORAL
Qty: 12 CAPSULE | Refills: 1 | Status: SHIPPED | OUTPATIENT
Start: 2024-04-12

## 2024-04-12 RX ORDER — PROGESTERONE 100 MG/1
CAPSULE ORAL
Qty: 30 CAPSULE | Refills: 3 | Status: SHIPPED | OUTPATIENT
Start: 2024-04-12

## 2024-04-12 NOTE — PROGRESS NOTES
Subjective:       Patient ID: Preeti Garvey is a 49 y.o. female.    Chief Complaint: Annual Exam    HPI  Review of Systems   Constitutional:  Negative for fatigue and unexpected weight change.   Respiratory:  Negative for chest tightness and shortness of breath.    Cardiovascular:  Negative for chest pain, palpitations and leg swelling.   Gastrointestinal:  Negative for abdominal pain.   Musculoskeletal:  Negative for arthralgias.   Neurological:  Negative for dizziness, syncope, light-headedness and headaches.       Patient Active Problem List   Diagnosis    Allergic rhinitis due to pollen    Muscle spasm    Psoriasis    Iron deficiency anemia    Drug-induced immunodeficiency     Patient is here for a chronic conditions follow up.    Reviewed labs 4/24   Prediabetes A1c 5.7  Iron and vit D are low. Mild anemia    GYN [facility] following for dysmenorrhea and heavy bleeding.  Has iron def anemia  Dr. Carlos Sanchez utd pap and mammo. Takes progesterone for 10 days for very heavy periods which are improving on their own. Last dose 2 months ago     On otezla for psoriasis Derm [place] Dr. Darrell Manzano will scheduled in [facility] 2017  Objective:      Physical Exam  Vitals and nursing note reviewed.   Constitutional:       Appearance: She is well-developed.   Cardiovascular:      Rate and Rhythm: Normal rate and regular rhythm.      Heart sounds: Normal heart sounds.   Pulmonary:      Effort: Pulmonary effort is normal.      Breath sounds: Normal breath sounds.   Skin:     General: Skin is warm and dry.   Neurological:      Mental Status: She is alert and oriented to person, place, and time.         Assessment:       1. Annual physical exam    2. Vitamin D deficiency    3. Iron deficiency anemia, unspecified iron deficiency anemia type    4. Encounter for screening mammogram for malignant neoplasm of breast    5. Colon cancer screening    6. DUB (dysfunctional uterine bleeding)    7. Drug-induced  immunodeficiency        Plan:         1. Vitamin D deficiency  Treat and monitor  - ergocalciferol (ERGOCALCIFEROL) 50,000 unit Cap; Take 1 capsule (50,000 Units total) by mouth every 7 days.  Dispense: 12 capsule; Refill: 1    2. Iron deficiency anemia, unspecified iron deficiency anemia type  Treat and monitor  - ferrous sulfate 324 mg (65 mg iron) TbEC; Take 1 tablet (324 mg total) by mouth once daily.  Dispense: 90 tablet; Refill: 3  - CBC Auto Differential; Future  - Ferritin; Future  - Iron and TIBC; Future  - Comprehensive Metabolic Panel; Future    3. Encounter for screening mammogram for malignant neoplasm of breast  Cont screenings    4. Colon cancer screening  Cont screenings when appropriate    5. DUB (dysfunctional uterine bleeding)  Restart cyclical progesterone 10 days of month for 6 months. F/u gyn    6. Annual physical exam  Discussed health maintenance guidelines appropriate for age.        7. Drug-induced immunodeficiency  Cont precautions      Time spent with patient: 20 minutes    Patient with be reevaluated in 6 months or sooner prn    Greater than 50% of this visit was spent counseling as described in above documentation:Yes

## 2024-04-16 PROBLEM — Z79.899 DRUG-INDUCED IMMUNODEFICIENCY: Status: ACTIVE | Noted: 2024-04-16

## 2024-04-16 PROBLEM — D84.821 DRUG-INDUCED IMMUNODEFICIENCY: Status: ACTIVE | Noted: 2024-04-16

## 2024-05-25 ENCOUNTER — LAB VISIT (OUTPATIENT)
Dept: LAB | Facility: HOSPITAL | Age: 49
End: 2024-05-25
Attending: FAMILY MEDICINE
Payer: COMMERCIAL

## 2024-05-25 DIAGNOSIS — D50.9 IRON DEFICIENCY ANEMIA, UNSPECIFIED IRON DEFICIENCY ANEMIA TYPE: ICD-10-CM

## 2024-05-25 LAB
ALBUMIN SERPL BCP-MCNC: 3.5 G/DL (ref 3.5–5.2)
ALP SERPL-CCNC: 80 U/L (ref 55–135)
ALT SERPL W/O P-5'-P-CCNC: 13 U/L (ref 10–44)
ANION GAP SERPL CALC-SCNC: 6 MMOL/L (ref 8–16)
AST SERPL-CCNC: 15 U/L (ref 10–40)
BASOPHILS # BLD AUTO: 0.07 K/UL (ref 0–0.2)
BASOPHILS NFR BLD: 1.1 % (ref 0–1.9)
BILIRUB SERPL-MCNC: 0.2 MG/DL (ref 0.1–1)
BUN SERPL-MCNC: 9 MG/DL (ref 6–20)
CALCIUM SERPL-MCNC: 9.5 MG/DL (ref 8.7–10.5)
CHLORIDE SERPL-SCNC: 107 MMOL/L (ref 95–110)
CO2 SERPL-SCNC: 22 MMOL/L (ref 23–29)
CREAT SERPL-MCNC: 0.7 MG/DL (ref 0.5–1.4)
DIFFERENTIAL METHOD BLD: ABNORMAL
EOSINOPHIL # BLD AUTO: 0.3 K/UL (ref 0–0.5)
EOSINOPHIL NFR BLD: 4.9 % (ref 0–8)
ERYTHROCYTE [DISTWIDTH] IN BLOOD BY AUTOMATED COUNT: 15.6 % (ref 11.5–14.5)
EST. GFR  (NO RACE VARIABLE): >60 ML/MIN/1.73 M^2
FERRITIN SERPL-MCNC: 16 NG/ML (ref 20–300)
GLUCOSE SERPL-MCNC: 97 MG/DL (ref 70–110)
HCT VFR BLD AUTO: 36.3 % (ref 37–48.5)
HGB BLD-MCNC: 11.3 G/DL (ref 12–16)
IMM GRANULOCYTES # BLD AUTO: 0.01 K/UL (ref 0–0.04)
IMM GRANULOCYTES NFR BLD AUTO: 0.2 % (ref 0–0.5)
IRON SERPL-MCNC: 34 UG/DL (ref 30–160)
LYMPHOCYTES # BLD AUTO: 2.1 K/UL (ref 1–4.8)
LYMPHOCYTES NFR BLD: 32.4 % (ref 18–48)
MCH RBC QN AUTO: 25.3 PG (ref 27–31)
MCHC RBC AUTO-ENTMCNC: 31.1 G/DL (ref 32–36)
MCV RBC AUTO: 81 FL (ref 82–98)
MONOCYTES # BLD AUTO: 0.6 K/UL (ref 0.3–1)
MONOCYTES NFR BLD: 9.3 % (ref 4–15)
NEUTROPHILS # BLD AUTO: 3.4 K/UL (ref 1.8–7.7)
NEUTROPHILS NFR BLD: 52.1 % (ref 38–73)
NRBC BLD-RTO: 0 /100 WBC
PLATELET # BLD AUTO: 386 K/UL (ref 150–450)
PMV BLD AUTO: 9.6 FL (ref 9.2–12.9)
POTASSIUM SERPL-SCNC: 3.8 MMOL/L (ref 3.5–5.1)
PROT SERPL-MCNC: 6.9 G/DL (ref 6–8.4)
RBC # BLD AUTO: 4.46 M/UL (ref 4–5.4)
SATURATED IRON: 8 % (ref 20–50)
SODIUM SERPL-SCNC: 135 MMOL/L (ref 136–145)
TOTAL IRON BINDING CAPACITY: 431 UG/DL (ref 250–450)
TRANSFERRIN SERPL-MCNC: 291 MG/DL (ref 200–375)
WBC # BLD AUTO: 6.55 K/UL (ref 3.9–12.7)

## 2024-05-25 PROCEDURE — 80053 COMPREHEN METABOLIC PANEL: CPT | Performed by: FAMILY MEDICINE

## 2024-05-25 PROCEDURE — 82728 ASSAY OF FERRITIN: CPT | Performed by: FAMILY MEDICINE

## 2024-05-25 PROCEDURE — 85025 COMPLETE CBC W/AUTO DIFF WBC: CPT | Performed by: FAMILY MEDICINE

## 2024-05-25 PROCEDURE — 36415 COLL VENOUS BLD VENIPUNCTURE: CPT | Mod: PO | Performed by: FAMILY MEDICINE

## 2024-05-25 PROCEDURE — 83540 ASSAY OF IRON: CPT | Performed by: FAMILY MEDICINE

## 2024-06-07 DIAGNOSIS — D50.9 IRON DEFICIENCY ANEMIA, UNSPECIFIED IRON DEFICIENCY ANEMIA TYPE: Primary | ICD-10-CM

## 2024-06-12 ENCOUNTER — TELEPHONE (OUTPATIENT)
Dept: HEMATOLOGY/ONCOLOGY | Facility: CLINIC | Age: 49
End: 2024-06-12
Payer: COMMERCIAL

## 2024-06-12 NOTE — NURSING
Oncology Navigation   Intake  Cancer Type: Benign hem (ROBERT)  Type of Referral: Internal (Dr Carina He)  Date of Referral: 06/07/24  Initial Nurse Navigator Contact: 06/12/24  Referral to Initial Contact Timeline (days): 5  First Appointment Available: 06/13/24 (requested a specific date)  Appointment Date: 06/21/24 (Dr Khoobehi)  First Available Date vs. Scheduled Date (days): 8     Treatment                              Acuity      Follow Up  No follow-ups on file.

## 2024-06-21 ENCOUNTER — OFFICE VISIT (OUTPATIENT)
Dept: HEMATOLOGY/ONCOLOGY | Facility: CLINIC | Age: 49
End: 2024-06-21
Payer: COMMERCIAL

## 2024-06-21 VITALS
HEART RATE: 90 BPM | WEIGHT: 140.88 LBS | RESPIRATION RATE: 16 BRPM | BODY MASS INDEX: 24.05 KG/M2 | DIASTOLIC BLOOD PRESSURE: 68 MMHG | SYSTOLIC BLOOD PRESSURE: 138 MMHG | HEIGHT: 64 IN | OXYGEN SATURATION: 97 % | TEMPERATURE: 97 F

## 2024-06-21 DIAGNOSIS — D50.0 IRON DEFICIENCY ANEMIA DUE TO CHRONIC BLOOD LOSS: Primary | ICD-10-CM

## 2024-06-21 PROCEDURE — 99999 PR PBB SHADOW E&M-EST. PATIENT-LVL IV: CPT | Mod: PBBFAC,,, | Performed by: INTERNAL MEDICINE

## 2024-06-21 NOTE — PROGRESS NOTES
Service Date:  6/21/24    Chief Complaint: ROBERT (NP)    Preeti Garvey is a 49 y.o. female here with iron deficiency.  Secondary to menorrhagia.  Has been on oral iron now for 3 months with daily consistency.  Referred here is a iron levels are still slightly low.  Set to have possible D&C in August for her menorrhagia.  No complaints to me.  Doing well.  Denies constipation.    Review of Systems   Constitutional: Negative.    HENT: Negative.     Eyes: Negative.    Respiratory: Negative.     Cardiovascular: Negative.    Gastrointestinal: Negative.    Endocrine: Negative.    Genitourinary: Negative.    Musculoskeletal: Negative.    Integumentary:  Negative.   Neurological: Negative.    Hematological: Negative.    Psychiatric/Behavioral: Negative.          Current Outpatient Medications   Medication Instructions    apremilast (OTEZLA) 30 mg, Oral, 2 times daily    cetirizine (ZYRTEC) 10 mg Cap 1 capsule, Oral, Daily    desonide (DESOWEN) 0.05 % cream APPLY TOPICALLY TO THE AFFECTED AREA ON CHIN TWICE DAILY AS NEEDED    ergocalciferol (ERGOCALCIFEROL) 50,000 Units, Oral, Every 7 days    ferrous sulfate 324 mg, Oral, Daily    progesterone (PROMETRIUM) 100 MG capsule Take 1 po qd 10 days per month then off. Continue same days of month cyclically        History reviewed. No pertinent past medical history.     No past surgical history on file.     Family History   Problem Relation Name Age of Onset    Melanoma Neg Hx      Psoriasis Neg Hx      Lupus Neg Hx      Eczema Neg Hx         Social History     Tobacco Use    Smoking status: Never    Smokeless tobacco: Never   Substance Use Topics    Alcohol use: Yes     Comment: once a month    Drug use: No         Vitals:    06/21/24 1513   BP: 138/68   Pulse: 90   Resp: 16   Temp: 97.2 °F (36.2 °C)        Physical Exam:  /68 (BP Location: Right arm, Patient Position: Sitting, BP Method: Small (Automatic))   Pulse 90   Temp 97.2 °F (36.2 °C) (Temporal)   Resp 16   Ht  "5' 4" (1.626 m)   Wt 63.9 kg (140 lb 14 oz)   SpO2 97%   BMI 24.18 kg/m²     Physical Exam  Constitutional:       Appearance: Normal appearance.   HENT:      Head: Normocephalic and atraumatic.      Nose: Nose normal.      Mouth/Throat:      Mouth: Mucous membranes are moist.      Pharynx: Oropharynx is clear.   Eyes:      Conjunctiva/sclera: Conjunctivae normal.   Cardiovascular:      Rate and Rhythm: Normal rate and regular rhythm.      Heart sounds: Normal heart sounds.   Pulmonary:      Effort: Pulmonary effort is normal.      Breath sounds: Normal breath sounds.   Abdominal:      General: Abdomen is flat. Bowel sounds are normal.      Palpations: Abdomen is soft.   Musculoskeletal:         General: Normal range of motion.      Cervical back: Normal range of motion and neck supple.   Skin:     General: Skin is warm and dry.   Neurological:      General: No focal deficit present.      Mental Status: She is alert and oriented to person, place, and time. Mental status is at baseline.   Psychiatric:         Mood and Affect: Mood normal.          Labs:  Lab Results   Component Value Date    WBC 6.55 05/25/2024    RBC 4.46 05/25/2024    HGB 11.3 (L) 05/25/2024    HCT 36.3 (L) 05/25/2024    MCV 81 (L) 05/25/2024    MCH 25.3 (L) 05/25/2024    MCHC 31.1 (L) 05/25/2024    RDW 15.6 (H) 05/25/2024     05/25/2024    MPV 9.6 05/25/2024    GRAN 3.4 05/25/2024    GRAN 52.1 05/25/2024    LYMPH 2.1 05/25/2024    LYMPH 32.4 05/25/2024    MONO 0.6 05/25/2024    MONO 9.3 05/25/2024    EOS 0.3 05/25/2024    BASO 0.07 05/25/2024    EOSINOPHIL 4.9 05/25/2024    BASOPHIL 1.1 05/25/2024     Sodium   Date Value Ref Range Status   05/25/2024 135 (L) 136 - 145 mmol/L Final     Potassium   Date Value Ref Range Status   05/25/2024 3.8 3.5 - 5.1 mmol/L Final     Chloride   Date Value Ref Range Status   05/25/2024 107 95 - 110 mmol/L Final     CO2   Date Value Ref Range Status   05/25/2024 22 (L) 23 - 29 mmol/L Final     Glucose "   Date Value Ref Range Status   05/25/2024 97 70 - 110 mg/dL Final     BUN   Date Value Ref Range Status   05/25/2024 9 6 - 20 mg/dL Final     Creatinine   Date Value Ref Range Status   05/25/2024 0.7 0.5 - 1.4 mg/dL Final     Calcium   Date Value Ref Range Status   05/25/2024 9.5 8.7 - 10.5 mg/dL Final     Total Protein   Date Value Ref Range Status   05/25/2024 6.9 6.0 - 8.4 g/dL Final     Albumin   Date Value Ref Range Status   05/25/2024 3.5 3.5 - 5.2 g/dL Final     Total Bilirubin   Date Value Ref Range Status   05/25/2024 0.2 0.1 - 1.0 mg/dL Final     Comment:     For infants and newborns, interpretation of results should be based  on gestational age, weight and in agreement with clinical  observations.    Premature Infant recommended reference ranges:  Up to 24 hours.............<8.0 mg/dL  Up to 48 hours............<12.0 mg/dL  3-5 days..................<15.0 mg/dL  6-29 days.................<15.0 mg/dL       Alkaline Phosphatase   Date Value Ref Range Status   05/25/2024 80 55 - 135 U/L Final     AST   Date Value Ref Range Status   05/25/2024 15 10 - 40 U/L Final     ALT   Date Value Ref Range Status   05/25/2024 13 10 - 44 U/L Final     Anion Gap   Date Value Ref Range Status   05/25/2024 6 (L) 8 - 16 mmol/L Final     eGFR if    Date Value Ref Range Status   06/13/2022 >60.0 >60 mL/min/1.73 m^2 Final     eGFR if non    Date Value Ref Range Status   06/13/2022 >60.0 >60 mL/min/1.73 m^2 Final     Comment:     Calculation used to obtain the estimated glomerular filtration  rate (eGFR) is the CKD-EPI equation.          A/P:    Iron deficiency anemia   -continue to try oral iron as I believe the D & C in August will help avoid any IV iron infusion.  -recheck blood work in 4 months from now, if iron level still low, then can consider IV iron      Aurash Khoobehi, MD  Hematology and Oncology

## 2024-07-22 DIAGNOSIS — E55.9 VITAMIN D DEFICIENCY: ICD-10-CM

## 2024-07-22 RX ORDER — ERGOCALCIFEROL 1.25 MG/1
50000 CAPSULE ORAL
Qty: 12 CAPSULE | Refills: 1 | Status: SHIPPED | OUTPATIENT
Start: 2024-07-22

## 2024-07-22 NOTE — TELEPHONE ENCOUNTER
No care due was identified.  [facility] Embedded Care Due Messages. Reference number: [id number].   7/22/2024 8:09:26 AM CDT   [facility]

## 2024-08-06 ENCOUNTER — HOSPITAL ENCOUNTER (OUTPATIENT)
Dept: RADIOLOGY | Facility: HOSPITAL | Age: 49
Discharge: HOME | End: 2024-08-06
Attending: FAMILY MEDICINE
Payer: COMMERCIAL

## 2024-08-06 ENCOUNTER — TELEPHONE (OUTPATIENT)
Dept: FAMILY MEDICINE | Facility: CLINIC | Age: 49
End: 2024-08-06
Payer: COMMERCIAL

## 2024-08-06 ENCOUNTER — OFFICE VISIT (OUTPATIENT)
Dept: FAMILY MEDICINE | Facility: CLINIC | Age: 49
End: 2024-08-06
Payer: COMMERCIAL

## 2024-08-06 VITALS
DIASTOLIC BLOOD PRESSURE: 60 MMHG | BODY MASS INDEX: 24.17 KG/M2 | SYSTOLIC BLOOD PRESSURE: 108 MMHG | WEIGHT: 141.56 LBS | OXYGEN SATURATION: 97 % | RESPIRATION RATE: 16 BRPM | HEIGHT: 64 IN | HEART RATE: 88 BPM

## 2024-08-06 DIAGNOSIS — Z98.890 POSTOPERATIVE STATE: ICD-10-CM

## 2024-08-06 DIAGNOSIS — M79.605 LEFT LEG PAIN: Primary | ICD-10-CM

## 2024-08-06 DIAGNOSIS — M79.605 LEFT LEG PAIN: ICD-10-CM

## 2024-08-06 PROCEDURE — 3008F BODY MASS INDEX DOCD: CPT | Mod: CPTII,S$GLB,, | Performed by: FAMILY MEDICINE

## 2024-08-06 PROCEDURE — 1159F MED LIST DOCD IN RCRD: CPT | Mod: CPTII,S$GLB,, | Performed by: FAMILY MEDICINE

## 2024-08-06 PROCEDURE — 93971 EXTREMITY STUDY: CPT | Mod: TC,LT

## 2024-08-06 PROCEDURE — 3078F DIAST BP <80 MM HG: CPT | Mod: CPTII,S$GLB,, | Performed by: FAMILY MEDICINE

## 2024-08-06 PROCEDURE — 3044F HG A1C LEVEL LT 7.0%: CPT | Mod: CPTII,S$GLB,, | Performed by: FAMILY MEDICINE

## 2024-08-06 PROCEDURE — 1160F RVW MEDS BY RX/DR IN RCRD: CPT | Mod: CPTII,S$GLB,, | Performed by: FAMILY MEDICINE

## 2024-08-06 PROCEDURE — 99213 OFFICE O/P EST LOW 20 MIN: CPT | Mod: S$GLB,,, | Performed by: FAMILY MEDICINE

## 2024-08-06 PROCEDURE — 93971 EXTREMITY STUDY: CPT | Mod: 26,LT,, | Performed by: RADIOLOGY

## 2024-08-06 PROCEDURE — 99999 PR PBB SHADOW E&M-EST. PATIENT-LVL IV: CPT | Mod: PBBFAC,,, | Performed by: FAMILY MEDICINE

## 2024-08-06 PROCEDURE — 3074F SYST BP LT 130 MM HG: CPT | Mod: CPTII,S$GLB,, | Performed by: FAMILY MEDICINE

## 2024-09-06 ENCOUNTER — OFFICE VISIT (OUTPATIENT)
Dept: FAMILY MEDICINE | Facility: CLINIC | Age: 49
End: 2024-09-06
Payer: COMMERCIAL

## 2024-09-06 VITALS
SYSTOLIC BLOOD PRESSURE: 120 MMHG | BODY MASS INDEX: 24.5 KG/M2 | OXYGEN SATURATION: 96 % | HEIGHT: 64 IN | HEART RATE: 76 BPM | DIASTOLIC BLOOD PRESSURE: 70 MMHG | TEMPERATURE: 98 F | WEIGHT: 143.5 LBS

## 2024-09-06 DIAGNOSIS — D50.9 IRON DEFICIENCY ANEMIA, UNSPECIFIED IRON DEFICIENCY ANEMIA TYPE: Primary | ICD-10-CM

## 2024-09-06 DIAGNOSIS — E55.9 VITAMIN D DEFICIENCY: ICD-10-CM

## 2024-09-06 DIAGNOSIS — Z13.1 DIABETES MELLITUS SCREENING: ICD-10-CM

## 2024-09-06 DIAGNOSIS — Z12.11 COLON CANCER SCREENING: ICD-10-CM

## 2024-09-06 PROCEDURE — 99999 PR PBB SHADOW E&M-EST. PATIENT-LVL IV: CPT | Mod: PBBFAC,,, | Performed by: NURSE PRACTITIONER

## 2024-09-06 RX ORDER — MEDROXYPROGESTERONE ACETATE 10 MG/1
1 TABLET ORAL 2 TIMES DAILY
COMMUNITY
Start: 2024-09-03

## 2024-09-06 NOTE — PROGRESS NOTES
Subjective:       Patient ID: Preeti Garvey is a 49 y.o. female.    Chief Complaint: Follow-up     HPI   50 y/o female patient with medical problems listed below presents for 3 months follow up and check iron level. Patient last seen by pcp was in 4/2024.     [facility] for dysmenorrhea and heavy bleeding. S/p hysteroscopy for abnormal uterine bleeding on 8/3/2024. States had vaginal bleeding 2 days ago, contacted GYN, and scheduled pelvic US in [place]. States likes to check iron level. States felt lightheaded and nauseated 2 days ago but no longer feels headache and nausea. Patient currently takes oral iron supplement once daily.     Patient Active Problem List   Diagnosis    Allergic rhinitis due to pollen    Muscle spasm    Psoriasis    Iron deficiency anemia    Drug-induced immunodeficiency      Review of patient's allergies indicates:   Allergen Reactions    Bactrim [sulfamethoxazole-trimethoprim] Rash     No past surgical history on file.       Current Outpatient Medications:     apremilast (OTEZLA) 30 mg Tab, Take 30 mg by mouth 2 (two) times daily., Disp: , Rfl:     ergocalciferol (ERGOCALCIFEROL) 50,000 unit Cap, TAKE 1 CAPSULE BY MOUTH EVERY 7 DAYS, Disp: 12 capsule, Rfl: 1    ferrous sulfate 324 mg (65 mg iron) TbEC, Take 1 tablet (324 mg total) by mouth once daily., Disp: 90 tablet, Rfl: 3    medroxyPROGESTERone (PROVERA) 10 MG tablet, Take 1 tablet by mouth 2 (two) times daily., Disp: , Rfl:     cetirizine (ZYRTEC) 10 mg Cap, Take 1 capsule by mouth once daily. (Patient not taking: Reported on 8/6/2024), Disp: 30 capsule, Rfl: 11    desonide (DESOWEN) 0.05 % cream, APPLY TOPICALLY TO THE AFFECTED AREA ON CHIN TWICE DAILY AS NEEDED (Patient not taking: Reported on 8/6/2024), Disp: , Rfl:     progesterone (PROMETRIUM) 100 MG capsule, Take 1 po qd 10 days per month then off. Continue same days of month cyclically (Patient not taking: Reported on 9/6/2024), Disp: 30 capsule, Rfl:  "3    Review of Systems   Constitutional:  Negative for chills and fever.   Respiratory:  Negative for cough and shortness of breath.    Cardiovascular:  Negative for chest pain and palpitations.   Gastrointestinal:  Negative for abdominal pain.   Neurological:  Negative for dizziness and headaches.       Objective:   /70 (BP Location: Left arm, Patient Position: Sitting, BP Method: Medium (Manual))   Pulse 76   Temp 98 °F (36.7 °C) (Oral)   Ht 5' 4" (1.626 m)   Wt 65.1 kg (143 lb 8.3 oz)   SpO2 96%   BMI 24.64 kg/m²         Physical Exam  Constitutional:       General: She is not in acute distress.     Appearance: Normal appearance.   HENT:      Head: Atraumatic.   Cardiovascular:      Rate and Rhythm: Normal rate and regular rhythm.      Pulses: Normal pulses.      Heart sounds: Normal heart sounds.   Pulmonary:      Effort: Pulmonary effort is normal.      Breath sounds: Normal breath sounds.   Abdominal:      General: Abdomen is flat. Bowel sounds are normal.      Palpations: Abdomen is soft.   Neurological:      Mental Status: She is oriented to person, place, and time.         Assessment:       1. Iron deficiency anemia, unspecified iron deficiency anemia type    2. Vitamin D deficiency    3. Diabetes mellitus screening    4. Colon cancer screening        Plan:       1. Vitamin D deficiency  - Misc Sendout Test, Blood Vitamin D; Future    2. Iron deficiency anemia, unspecified iron deficiency anemia type  - CBC Auto Differential; Future  - Comprehensive Metabolic Panel; Future  - Lipid Panel; Future  - TSH; Future  - Ferritin; Future  - Iron and TIBC; Future    3. Diabetes mellitus screening  - Hemoglobin A1C; Future    4. Colon cancer screening  - Patient likes to defer until she is done with w/u of vaginal bleeding     Patient with be reevaluated in  as scheduled  or sooner dee Larry NP  "

## 2024-09-07 ENCOUNTER — LAB VISIT (OUTPATIENT)
Dept: LAB | Facility: HOSPITAL | Age: 49
End: 2024-09-07
Attending: NURSE PRACTITIONER
Payer: COMMERCIAL

## 2024-09-07 DIAGNOSIS — D50.9 IRON DEFICIENCY ANEMIA, UNSPECIFIED IRON DEFICIENCY ANEMIA TYPE: ICD-10-CM

## 2024-09-07 DIAGNOSIS — Z13.1 DIABETES MELLITUS SCREENING: ICD-10-CM

## 2024-09-07 DIAGNOSIS — E55.9 VITAMIN D DEFICIENCY: ICD-10-CM

## 2024-09-07 LAB
25(OH)D3+25(OH)D2 SERPL-MCNC: 35 NG/ML (ref 30–96)
ALBUMIN SERPL BCP-MCNC: 3.6 G/DL (ref 3.5–5.2)
ALP SERPL-CCNC: 81 U/L (ref 55–135)
ALT SERPL W/O P-5'-P-CCNC: 13 U/L (ref 10–44)
ANION GAP SERPL CALC-SCNC: 11 MMOL/L (ref 8–16)
AST SERPL-CCNC: 16 U/L (ref 10–40)
BASOPHILS # BLD AUTO: 0.04 K/UL (ref 0–0.2)
BASOPHILS NFR BLD: 0.7 % (ref 0–1.9)
BILIRUB SERPL-MCNC: 0.3 MG/DL (ref 0.1–1)
BUN SERPL-MCNC: 8 MG/DL (ref 6–20)
CALCIUM SERPL-MCNC: 9.5 MG/DL (ref 8.7–10.5)
CHLORIDE SERPL-SCNC: 107 MMOL/L (ref 95–110)
CHOLEST SERPL-MCNC: 156 MG/DL (ref 120–199)
CHOLEST/HDLC SERPL: 3.5 {RATIO} (ref 2–5)
CO2 SERPL-SCNC: 21 MMOL/L (ref 23–29)
CREAT SERPL-MCNC: 0.7 MG/DL (ref 0.5–1.4)
DIFFERENTIAL METHOD BLD: NORMAL
EOSINOPHIL # BLD AUTO: 0.3 K/UL (ref 0–0.5)
EOSINOPHIL NFR BLD: 5 % (ref 0–8)
ERYTHROCYTE [DISTWIDTH] IN BLOOD BY AUTOMATED COUNT: 13.2 % (ref 11.5–14.5)
EST. GFR  (NO RACE VARIABLE): >60 ML/MIN/1.73 M^2
ESTIMATED AVG GLUCOSE: 114 MG/DL (ref 68–131)
FERRITIN SERPL-MCNC: 23 NG/ML (ref 20–300)
GLUCOSE SERPL-MCNC: 92 MG/DL (ref 70–110)
HBA1C MFR BLD: 5.6 % (ref 4–5.6)
HCT VFR BLD AUTO: 40.3 % (ref 37–48.5)
HDLC SERPL-MCNC: 44 MG/DL (ref 40–75)
HDLC SERPL: 28.2 % (ref 20–50)
HGB BLD-MCNC: 13.1 G/DL (ref 12–16)
IMM GRANULOCYTES # BLD AUTO: 0.01 K/UL (ref 0–0.04)
IMM GRANULOCYTES NFR BLD AUTO: 0.2 % (ref 0–0.5)
IRON SERPL-MCNC: 85 UG/DL (ref 30–160)
LDLC SERPL CALC-MCNC: 88.8 MG/DL (ref 63–159)
LYMPHOCYTES # BLD AUTO: 1.9 K/UL (ref 1–4.8)
LYMPHOCYTES NFR BLD: 33.2 % (ref 18–48)
MCH RBC QN AUTO: 29 PG (ref 27–31)
MCHC RBC AUTO-ENTMCNC: 32.5 G/DL (ref 32–36)
MCV RBC AUTO: 89 FL (ref 82–98)
MONOCYTES # BLD AUTO: 0.4 K/UL (ref 0.3–1)
MONOCYTES NFR BLD: 7.5 % (ref 4–15)
NEUTROPHILS # BLD AUTO: 3 K/UL (ref 1.8–7.7)
NEUTROPHILS NFR BLD: 53.4 % (ref 38–73)
NONHDLC SERPL-MCNC: 112 MG/DL
NRBC BLD-RTO: 0 /100 WBC
PLATELET # BLD AUTO: 322 K/UL (ref 150–450)
PMV BLD AUTO: 9.7 FL (ref 9.2–12.9)
POTASSIUM SERPL-SCNC: 3.7 MMOL/L (ref 3.5–5.1)
PROT SERPL-MCNC: 7 G/DL (ref 6–8.4)
RBC # BLD AUTO: 4.52 M/UL (ref 4–5.4)
SATURATED IRON: 23 % (ref 20–50)
SODIUM SERPL-SCNC: 139 MMOL/L (ref 136–145)
TOTAL IRON BINDING CAPACITY: 371 UG/DL (ref 250–450)
TRANSFERRIN SERPL-MCNC: 251 MG/DL (ref 200–375)
TRIGL SERPL-MCNC: 116 MG/DL (ref 30–150)
TSH SERPL DL<=0.005 MIU/L-ACNC: 1.36 UIU/ML (ref 0.4–4)
WBC # BLD AUTO: 5.61 K/UL (ref 3.9–12.7)

## 2024-09-07 PROCEDURE — 83036 HEMOGLOBIN GLYCOSYLATED A1C: CPT | Performed by: NURSE PRACTITIONER

## 2024-09-07 PROCEDURE — 82306 VITAMIN D 25 HYDROXY: CPT | Performed by: NURSE PRACTITIONER

## 2024-09-07 PROCEDURE — 80061 LIPID PANEL: CPT | Performed by: NURSE PRACTITIONER

## 2024-09-07 PROCEDURE — 85025 COMPLETE CBC W/AUTO DIFF WBC: CPT | Performed by: NURSE PRACTITIONER

## 2024-09-07 PROCEDURE — 82728 ASSAY OF FERRITIN: CPT | Performed by: NURSE PRACTITIONER

## 2024-09-07 PROCEDURE — 84443 ASSAY THYROID STIM HORMONE: CPT | Performed by: NURSE PRACTITIONER

## 2024-09-07 PROCEDURE — 83540 ASSAY OF IRON: CPT | Performed by: NURSE PRACTITIONER

## 2024-09-07 PROCEDURE — 80053 COMPREHEN METABOLIC PANEL: CPT | Performed by: NURSE PRACTITIONER

## 2024-09-07 PROCEDURE — 36415 COLL VENOUS BLD VENIPUNCTURE: CPT | Mod: PO | Performed by: NURSE PRACTITIONER

## 2024-09-09 ENCOUNTER — TELEPHONE (OUTPATIENT)
Dept: FAMILY MEDICINE | Facility: CLINIC | Age: 49
End: 2024-09-09
Payer: COMMERCIAL

## 2024-09-09 DIAGNOSIS — R10.2 PELVIC PAIN SYNDROME: Primary | ICD-10-CM

## 2024-09-09 NOTE — TELEPHONE ENCOUNTER
----- Message from Reanna Hernandez sent at 9/9/2024  7:53 AM CDT -----  Regarding: Call back  Contact: [phone]  Type:  Needs Medical Advice    Who Called: PT   Symptoms (please be specific): Needs orders for an ultrasound of her uterus and both ovaries   Would the patient rather a call back or a response via [facility]? Call back   Best Call Back Number: [phone]   Additional Information:

## 2024-09-09 NOTE — TELEPHONE ENCOUNTER
Spoke to pt who states she is having problems with heavy bleeding. Pt was advised to be seen by OB/GYN and had a D&C 8/3/24 by Dr. Santos at [facility]. Pt states this did not resolve her issues of heavy bleeding. Dr. Santos ordered the US internally and externally of uterus and ovaries. Pt wanting to have done at [facility]. Advised pt to have orders sent to [facility] from Dr. Santos and then can schedule via [facility]. Pt verbalizes understanding and provided with number for scheduling.

## 2024-09-13 ENCOUNTER — HOSPITAL ENCOUNTER (OUTPATIENT)
Dept: RADIOLOGY | Facility: HOSPITAL | Age: 49
Discharge: HOME | End: 2024-09-13
Attending: OBSTETRICS & GYNECOLOGY
Payer: COMMERCIAL

## 2024-09-13 DIAGNOSIS — R10.2 PELVIC PAIN SYNDROME: ICD-10-CM

## 2024-09-13 PROCEDURE — 76856 US EXAM PELVIC COMPLETE: CPT | Mod: 26,,, | Performed by: RADIOLOGY

## 2024-09-13 PROCEDURE — 76856 US EXAM PELVIC COMPLETE: CPT | Mod: TC,PO

## 2024-09-13 PROCEDURE — 76830 TRANSVAGINAL US NON-OB: CPT | Mod: TC,PO

## 2024-09-13 PROCEDURE — 76830 TRANSVAGINAL US NON-OB: CPT | Mod: 26,,, | Performed by: RADIOLOGY

## 2024-10-07 ENCOUNTER — OFFICE VISIT (OUTPATIENT)
Dept: PODIATRY | Facility: CLINIC | Age: 49
End: 2024-10-07
Payer: COMMERCIAL

## 2024-10-07 VITALS — HEIGHT: 64 IN | WEIGHT: 142.44 LBS | RESPIRATION RATE: 16 BRPM | BODY MASS INDEX: 24.32 KG/M2 | HEART RATE: 74 BPM

## 2024-10-07 DIAGNOSIS — M72.2 PLANTAR FASCIITIS: Primary | ICD-10-CM

## 2024-10-07 DIAGNOSIS — M79.672 PAIN IN BOTH FEET: ICD-10-CM

## 2024-10-07 DIAGNOSIS — M79.671 PAIN IN BOTH FEET: ICD-10-CM

## 2024-10-07 DIAGNOSIS — B35.3 TINEA PEDIS OF BOTH FEET: ICD-10-CM

## 2024-10-07 PROCEDURE — 1160F RVW MEDS BY RX/DR IN RCRD: CPT | Mod: CPTII,S$GLB,, | Performed by: PODIATRIST

## 2024-10-07 PROCEDURE — 99203 OFFICE O/P NEW LOW 30 MIN: CPT | Mod: S$GLB,,, | Performed by: PODIATRIST

## 2024-10-07 PROCEDURE — 99999 PR PBB SHADOW E&M-EST. PATIENT-LVL III: CPT | Mod: PBBFAC,,, | Performed by: PODIATRIST

## 2024-10-07 PROCEDURE — 3008F BODY MASS INDEX DOCD: CPT | Mod: CPTII,S$GLB,, | Performed by: PODIATRIST

## 2024-10-07 PROCEDURE — 1159F MED LIST DOCD IN RCRD: CPT | Mod: CPTII,S$GLB,, | Performed by: PODIATRIST

## 2024-10-07 PROCEDURE — 3044F HG A1C LEVEL LT 7.0%: CPT | Mod: CPTII,S$GLB,, | Performed by: PODIATRIST

## 2024-10-07 RX ORDER — CLOTRIMAZOLE AND BETAMETHASONE DIPROPIONATE 10; .64 MG/G; MG/G
CREAM TOPICAL 2 TIMES DAILY
Qty: 45 G | Refills: 3 | Status: SHIPPED | OUTPATIENT
Start: 2024-10-07

## 2024-10-07 RX ORDER — PIMECROLIMUS 10 MG/G
CREAM TOPICAL 2 TIMES DAILY
COMMUNITY
Start: 2024-09-30

## 2024-10-07 RX ORDER — MELOXICAM 15 MG/1
15 TABLET ORAL DAILY
Qty: 30 TABLET | Refills: 3 | Status: SHIPPED | OUTPATIENT
Start: 2024-10-07

## 2024-10-07 NOTE — PATIENT INSTRUCTIONS

## 2024-10-07 NOTE — PROGRESS NOTES
"  [address] [place] [address]  Phone: [phone]   [phone]    Patient's PCP:Carina He MD  Referring Provider: Aaareferral Self    Subjective:      Chief Complaint:: Foot Pain (Bilateral foot pain worse on right)    NILE Garvey is a 49 y.o. female who presents today with a complaint of bilateral foor pain worse in right foot. The current episode started three weeks ago.  The symptoms include sharp to throbbing pain. Probable cause of complaint unknown.  The symptoms are aggravated by walking, weightbearing, first steps in the morning and increased pain upon rising after rest. The problem has worsened. Treatment to date have included heating pad, cold compress, sneakers, warm water soaks, ibuprofen  which provided some relief.       Vitals:    10/07/24 1543   Pulse: 74   Resp: 16   Weight: 64.6 kg (142 lb 6.7 oz)   Height: 5' 4" (1.626 m)   PainSc:   7      Shoe Size: 7.5    History reviewed. No pertinent surgical history.  History reviewed. No pertinent past medical history.  Family History   Problem Relation Name Age of Onset    Melanoma Neg Hx      Psoriasis Neg Hx      Lupus Neg Hx      Eczema Neg Hx          Social History:   Marital Status:   Alcohol History:  reports current alcohol use.  Tobacco History:  reports that she has never smoked. She has never used smokeless tobacco.  Drug History:  reports no history of drug use.    Review of patient's allergies indicates:   Allergen Reactions    Bactrim [sulfamethoxazole-trimethoprim] Rash       Current Outpatient Medications   Medication Sig Dispense Refill    pimecrolimus (ELIDEL) 1 % cream 2 (two) times daily. apply to affected area      apremilast (OTEZLA) 30 mg Tab Take 30 mg by mouth 2 (two) times daily.      cetirizine (ZYRTEC) 10 mg Cap Take 1 capsule by mouth once daily. (Patient not taking: Reported on 8/6/2024) 30 capsule 11    clotrimazole-betamethasone 1-0.05% (LOTRISONE) cream Apply topically 2 " (two) times daily. 45 g 3    desonide (DESOWEN) 0.05 % cream APPLY TOPICALLY TO THE AFFECTED AREA ON CHIN TWICE DAILY AS NEEDED (Patient not taking: Reported on 8/6/2024)      ergocalciferol (ERGOCALCIFEROL) 50,000 unit Cap TAKE 1 CAPSULE BY MOUTH EVERY 7 DAYS 12 capsule 1    ferrous sulfate 324 mg (65 mg iron) TbEC Take 1 tablet (324 mg total) by mouth once daily. 90 tablet 3    medroxyPROGESTERone (PROVERA) 10 MG tablet Take 1 tablet by mouth 2 (two) times daily.      meloxicam (MOBIC) 15 MG tablet Take 1 tablet (15 mg total) by mouth once daily. 30 tablet 3    progesterone (PROMETRIUM) 100 MG capsule Take 1 po qd 10 days per month then off. Continue same days of month cyclically (Patient not taking: Reported on 9/6/2024) 30 capsule 3     No current facility-administered medications for this visit.       Review of Systems   Constitutional:  Negative for chills, fatigue, fever and unexpected weight change.   HENT:  Negative for hearing loss and trouble swallowing.    Eyes:  Negative for photophobia and visual disturbance.   Respiratory:  Negative for cough, shortness of breath and wheezing.    Cardiovascular:  Negative for chest pain, palpitations and leg swelling.   Gastrointestinal:  Negative for abdominal pain and nausea.   Genitourinary:  Negative for dysuria and frequency.   Musculoskeletal:  Negative for arthralgias, back pain, gait problem, joint swelling and myalgias.   Skin:  Negative for rash.   Neurological:  Negative for tremors, seizures, speech difficulty, weakness and headaches.   Hematological:  Does not bruise/bleed easily.         Objective:        Physical Exam:   Foot Exam    General  General Appearance: appears stated age and healthy   Orientation: alert and oriented to person, place, and time   Affect: appropriate   Gait: unimpaired       Right Foot/Ankle     Inspection and Palpation  Ecchymosis: none  Tenderness: plantar fascia   Swelling: none   Arch: normal  Skin Exam: tinea; no drainage,  no ulcer and no erythema   Neurovascular  Dorsalis pedis: 2+  Posterior tibial: 2+  Capillary Refill: 2+  Varicose veins: not present  Saphenous nerve sensation: normal  Tibial nerve sensation: normal  Superficial peroneal nerve sensation: normal  Deep peroneal nerve sensation: normal  Sural nerve sensation: normal    Edema  Type of edema: non-pitting    Muscle Strength  Ankle dorsiflexion: 5  Ankle plantar flexion: 5  Ankle inversion: 5  Ankle eversion: 5  Great toe extension: 5  Great toe flexion: 5    Range of Motion    Normal right ankle ROM    Tests  Anterior drawer: negative   Talar tilt: negative   PT Tinel's sign: negative    Paresthesia: negative  Comments  Pain on palpation of the infeior medial heel and central plantar heel. No pain present  with side to side compression of the calcaneus. Negative tinnel's sign  at the tarsal tunnel. Negative Moore's nerve pain. Negative Calcaneal nerve pain. No soft tissue masses. Pain absent  with dorsiflexion of the ankle. No edema, erythema, or ecchymosis noted.       Left Foot/Ankle      Inspection and Palpation  Ecchymosis: none  Tenderness: plantar fascia   Swelling: none   Arch: normal  Skin Exam: tinea; no drainage, no ulcer and no erythema   Neurovascular  Dorsalis pedis: 2+  Posterior tibial: 2+  Capillary refill: 2+  Varicose veins: not present  Saphenous nerve sensation: normal  Tibial nerve sensation: normal  Superficial peroneal nerve sensation: normal  Deep peroneal nerve sensation: normal  Sural nerve sensation: normal    Edema  Type of edema: non-pitting    Muscle Strength  Ankle dorsiflexion: 5  Ankle plantar flexion: 5  Ankle inversion: 5  Ankle eversion: 5  Great toe extension: 5  Great toe flexion: 5    Range of Motion    Normal left ankle ROM    Tests  Anterior drawer: negative   Talar tilt: negative   PT Tinel's sign: negative  Paresthesia: negative  Comments  Pain on palpation of the infeior medial heel and central plantar heel. No pain present   with side to side compression of the calcaneus. Negative tinnel's sign  at the tarsal tunnel. Negative Moore's nerve pain. Negative Calcaneal nerve pain. No soft tissue masses. Pain absent  with dorsiflexion of the ankle. No edema, erythema, or ecchymosis noted.       Physical Exam  Cardiovascular:      Pulses:           Dorsalis pedis pulses are 2+ on the right side and 2+ on the left side.        Posterior tibial pulses are 2+ on the right side and 2+ on the left side.   Feet:      Right foot:      Skin integrity: No ulcer or erythema.      Left foot:      Skin integrity: No ulcer or erythema.               Right Ankle/Foot Exam     Range of Motion   The patient has normal right ankle ROM.    Comments:  Pain on palpation of the infeior medial heel and central plantar heel. No pain present  with side to side compression of the calcaneus. Negative tinnel's sign  at the tarsal tunnel. Negative Moore's nerve pain. Negative Calcaneal nerve pain. No soft tissue masses. Pain absent  with dorsiflexion of the ankle. No edema, erythema, or ecchymosis noted.       Left Ankle/Foot Exam     Range of Motion   The patient has normal left ankle ROM.     Comments:  Pain on palpation of the infeior medial heel and central plantar heel. No pain present  with side to side compression of the calcaneus. Negative tinnel's sign  at the tarsal tunnel. Negative Moore's nerve pain. Negative Calcaneal nerve pain. No soft tissue masses. Pain absent  with dorsiflexion of the ankle. No edema, erythema, or ecchymosis noted.         Muscle Strength   Right Lower Extremity   Ankle Dorsiflexion:  5   Plantar flexion:  5/5  Left Lower Extremity   Ankle Dorsiflexion:  5   Plantar flexion:  5/5     Vascular Exam     Right Pulses  Dorsalis Pedis:      2+  Posterior Tibial:      2+        Left Pulses  Dorsalis Pedis:      2+  Posterior Tibial:      2+           Imaging: none            Assessment:       1. Plantar fasciitis    2. Pain in both feet     3. Tinea pedis of both feet      Plan:   Plantar fasciitis  -     meloxicam (MOBIC) 15 MG tablet; Take 1 tablet (15 mg total) by mouth once daily.  Dispense: 30 tablet; Refill: 3    Pain in both feet  -     meloxicam (MOBIC) 15 MG tablet; Take 1 tablet (15 mg total) by mouth once daily.  Dispense: 30 tablet; Refill: 3    Tinea pedis of both feet  -     clotrimazole-betamethasone 1-0.05% (LOTRISONE) cream; Apply topically 2 (two) times daily.  Dispense: 45 g; Refill: 3      Follow up if symptoms worsen or fail to improve.    Procedures        Discussed different treatment options for heel pain. I gave written and verbal instructions on heel cord stretching and this was demonstrated for the patient. Patient expressed understanding. Discussed wearing appropriate shoe gear and avoiding flats, slippers, sandals, barefoot, and sockfeet. Recommended arch supports. My recommendation for OTC supports is Spenco polysorb replacement insoles or patient may elect more aggressive treatment with prescription arch supports. We also discussed cortisone injections and NSAID therapy.       Counseling:     I provided patient education verbally regarding:   Patient diagnosis, treatment options, as well as alternatives, risks, and benefits.     This note was created using Dragon voice recognition software that occasionally misinterpreted phrases or words.

## 2024-10-09 ENCOUNTER — TELEPHONE (OUTPATIENT)
Dept: PODIATRY | Facility: CLINIC | Age: 49
End: 2024-10-09
Payer: COMMERCIAL

## 2024-10-09 NOTE — TELEPHONE ENCOUNTER
----- Message from Briana Tristan sent at 10/9/2024  8:36 AM CDT -----  Regarding: Rx  Patient was given a Rx from Dr BRUMFIELD that she can take according to one of her other MD's .  The anti-inflammatory.  Can she just take ibuprofen or do you want her to take another kind of Rx?  [phone]

## 2024-10-10 NOTE — TELEPHONE ENCOUNTER
Spoke to pt and clarified question. Pt stated will be having surgery and has questions regarding the meds. Forwarded message to provider. Also informed pt to ask her surgeon what kind of meds they prefer she stop before surgery.

## 2024-10-15 ENCOUNTER — TELEPHONE (OUTPATIENT)
Dept: HEMATOLOGY/ONCOLOGY | Facility: CLINIC | Age: 49
End: 2024-10-15
Payer: COMMERCIAL

## 2024-10-15 NOTE — TELEPHONE ENCOUNTER
Called to Confirm lab/appt w/Dr Khoobehi 10/22/24, pt request appts be cancelled and she will call to reschedule once she gets her schedule straight, notified pt appts will be cancelled and nurse notified, pt agrees.

## 2024-10-18 ENCOUNTER — OFFICE VISIT (OUTPATIENT)
Dept: FAMILY MEDICINE | Facility: CLINIC | Age: 49
End: 2024-10-18
Payer: COMMERCIAL

## 2024-10-18 ENCOUNTER — PATIENT MESSAGE (OUTPATIENT)
Dept: GASTROENTEROLOGY | Facility: CLINIC | Age: 49
End: 2024-10-18
Payer: COMMERCIAL

## 2024-10-18 VITALS
OXYGEN SATURATION: 84 % | BODY MASS INDEX: 24.39 KG/M2 | DIASTOLIC BLOOD PRESSURE: 68 MMHG | HEART RATE: 99 BPM | SYSTOLIC BLOOD PRESSURE: 120 MMHG | TEMPERATURE: 98 F | WEIGHT: 142.88 LBS | HEIGHT: 64 IN

## 2024-10-18 DIAGNOSIS — E55.9 VITAMIN D DEFICIENCY: ICD-10-CM

## 2024-10-18 DIAGNOSIS — R73.9 HYPERGLYCEMIA: ICD-10-CM

## 2024-10-18 DIAGNOSIS — Z12.11 COLON CANCER SCREENING: Primary | ICD-10-CM

## 2024-10-18 DIAGNOSIS — N93.8 DUB (DYSFUNCTIONAL UTERINE BLEEDING): ICD-10-CM

## 2024-10-18 DIAGNOSIS — Z00.00 ANNUAL PHYSICAL EXAM: ICD-10-CM

## 2024-10-18 DIAGNOSIS — D50.9 IRON DEFICIENCY ANEMIA, UNSPECIFIED IRON DEFICIENCY ANEMIA TYPE: ICD-10-CM

## 2024-10-18 DIAGNOSIS — L40.9 PSORIASIS: ICD-10-CM

## 2024-10-18 PROCEDURE — 99999 PR PBB SHADOW E&M-EST. PATIENT-LVL IV: CPT | Mod: PBBFAC,,, | Performed by: FAMILY MEDICINE

## 2024-10-18 RX ORDER — ERGOCALCIFEROL 1.25 MG/1
50000 CAPSULE ORAL
Qty: 12 CAPSULE | Refills: 1 | Status: SHIPPED | OUTPATIENT
Start: 2024-10-18

## 2024-10-18 NOTE — PROGRESS NOTES
Subjective:       Patient ID: Preeti Garvey is a 49 y.o. female.    Chief Complaint: Follow-up (Review labs.)    Patient Active Problem List   Diagnosis    Allergic rhinitis due to pollen    Muscle spasm    Psoriasis    Iron deficiency anemia    Drug-induced immunodeficiency     Patient is here for a chronic conditions follow up.    Reviewed labs 9/2024  Typically declines vaccines  History of Present Illness    CHIEF COMPLAINT:  Ms. Garvey presents today for annual checkup.    LAB RESULTS:  She reports improved prediabetic status with HbA1c now in the non-diabetic range. Iron levels have normalized after years of being low. Thyroid function tests are normal. Cholesterol profile shows improvement with total cholesterol under 200, triglycerides under 150, HDL at 44, and LDL at an ideal level. Comprehensive metabolic panel results are largely normal, with resolved anemia. Vitamin D levels have normalized, which she attributes to daily supplementation instead of the prescribed weekly regimen. She expresses satisfaction with the overall improvements in her lab results.    LIFESTYLE CHANGES:  She reports improved eating habits, particularly focusing on watching carbohydrate intake. She has increased her exercise regimen for stress reduction. These lifestyle changes have contributed to improvements in her blood sugar and overall lab results.    MUSCULOSKELETAL:  She reports experiencing bilateral foot pain due to increased exercise, diagnosed as plantar fasciitis. She uses a foot pad and brace that sits in the middle of her foot and keeps it in a specific position. She notes some improvement with these interventions but acknowledges that relief requires daily rehabilitation efforts.    GYNECOLOGICAL:  She reports a history of heavy menstrual bleeding. She underwent a D&C procedure in August. In September, she had a severe bleeding episode that led to scheduling a hysterectomy. However, she denies any bleeding  "since then. She is scheduled for an ultrasound today to evaluate the thickness of her endometrial lining. She expresses uncertainty about whether to proceed with the planned hysterectomy given the recent absence of bleeding.    MEDICATIONS:  She reports taking Motrin 400 mg twice daily for plantar fasciitis-related foot pain.    ENT:  She reports feeling of draining in her ear, describing it as a "weird, watery feeling" without popping or fullness. She denies ear pain or discomfort. She acknowledges experiencing allergy symptoms, particularly during ragweed season, which tend to be more noticeable in the mornings.      ROS:  ROS findings as noted in HPI. History of Present Illness    CHIEF COMPLAINT:  Ms. Garvey presents today for annual checkup.    LAB RESULTS:  She reports improved prediabetic status with HbA1c now in the non-diabetic range. Iron levels have normalized after years of being low. Thyroid function tests are normal. Cholesterol profile shows improvement with total cholesterol under 200, triglycerides under 150, HDL at 44, and LDL at an ideal level. Comprehensive metabolic panel results are largely normal, with resolved anemia. Vitamin D levels have normalized, which she attributes to daily supplementation instead of the prescribed weekly regimen. She expresses satisfaction with the overall improvements in her lab results.    LIFESTYLE CHANGES:  She reports improved eating habits, particularly focusing on watching carbohydrate intake. She has increased her exercise regimen for stress reduction. These lifestyle changes have contributed to improvements in her blood sugar and overall lab results.    MUSCULOSKELETAL:  She reports experiencing bilateral foot pain due to increased exercise, diagnosed as plantar fasciitis. She uses a foot pad and brace that sits in the middle of her foot and keeps it in a specific position. She notes some improvement with these interventions but acknowledges that relief " "requires daily rehabilitation efforts.    GYNECOLOGICAL:  She reports a history of heavy menstrual bleeding. She underwent a D&C procedure in August. In September, she had a severe bleeding episode that led to scheduling a hysterectomy. However, she denies any bleeding since then. She is scheduled for an ultrasound today to evaluate the thickness of her endometrial lining. She expresses uncertainty about whether to proceed with the planned hysterectomy given the recent absence of bleeding.    MEDICATIONS:  She reports taking Motrin 400 mg twice daily for plantar fasciitis-related foot pain.    ENT:  She reports feeling of draining in her ear, describing it as a "weird, watery feeling" without popping or fullness. She denies ear pain or discomfort. She acknowledges experiencing allergy symptoms, particularly during ragweed season, which tend to be more noticeable in the mornings.      ROS:  ROS findings as noted in HPI.            Review of Systems   Constitutional:  Negative for fatigue and unexpected weight change.   Respiratory:  Negative for chest tightness and shortness of breath.    Cardiovascular:  Negative for chest pain, palpitations and leg swelling.   Gastrointestinal:  Negative for abdominal pain.   Musculoskeletal:  Negative for arthralgias.   Neurological:  Negative for dizziness, syncope, light-headedness and headaches.      Relevant History:  Podiatry Dr. Power treating obdulio PF    Endocrine Prediabetes A1c 5.6. vit D normal    Heme/onc Dr. Khoobehi  Iron and anemia normal now     GYN [facility] Dr. Santos  following for dysmenorrhea and heavy bleeding.  Has iron def anemia  Dr. Santos- Daniel utd pap and mammo. Takes progesterone for 10 days for very heavy periods which are improving on their own.  Had D & C . Had bout of      Derm On otezla for psoriasis Derm Peggy Ramírez      Gi    Objective:      Physical Exam  Vitals and nursing note reviewed.   Constitutional:       Appearance: She is " well-developed.   Cardiovascular:      Rate and Rhythm: Normal rate and regular rhythm.      Heart sounds: Normal heart sounds.   Pulmonary:      Effort: Pulmonary effort is normal.      Breath sounds: Normal breath sounds.   Skin:     General: Skin is warm and dry.   Neurological:      Mental Status: She is alert and oriented to person, place, and time.         Assessment:       ICD-10-CM ICD-9-CM    1. Colon cancer screening  Z12.11 V76.51 Case Request Endoscopy: COLONOSCOPY      2. Annual physical exam  Z00.00 V70.0 CBC Auto Differential      Comprehensive Metabolic Panel      Hemoglobin A1C      Lipid Panel      3. Vitamin D deficiency  E55.9 268.9 Vitamin D      ergocalciferol (ERGOCALCIFEROL) 50,000 unit Cap      4. Iron deficiency anemia, unspecified iron deficiency anemia type  D50.9 280.9 CBC Auto Differential      Ferritin      Iron and TIBC      5. Psoriasis  L40.9 696.1       6. Hyperglycemia  R73.9 790.29 Hemoglobin A1C         Plan:   1. Colon cancer screening (Primary)  Refer for screening  - Case Request Endoscopy: COLONOSCOPY    2. Annual physical exam  Screen and treat as indicated:    - CBC Auto Differential; Future  - Comprehensive Metabolic Panel; Future  - Hemoglobin A1C; Future  - Lipid Panel; Future    3. Vitamin D deficiency  Continue  - Vitamin D; Future  - ergocalciferol (ERGOCALCIFEROL) 50,000 unit Cap; Take 1 capsule (50,000 Units total) by mouth every 7 days.  Dispense: 12 capsule; Refill: 1    4. Iron deficiency anemia, unspecified iron deficiency anemia type  Normal. Cont supplement and monitor  - CBC Auto Differential; Future  - Ferritin; Future  - Iron and TIBC; Future    5. Psoriasis  Cont current mgmt. controlled    6. Hyperglycemia  Improved to non diabetic range with diet  - Hemoglobin A1C; Future      7. DUB (dysfunctional uterine bleeding)  S/p D & C. No period last month but had very heavy one the month before. Has us f/u.  May consider hysterectomy if dub  continues    Assessment & Plan    - Explained plantar fasciitis pathophysiology, including nighttime healing posture and morning pain.  - Discussed menopausal transition and potential for irregular, heavy periods due to unopposed estrogen.  - Educated on the benefits of vaginal approach for hysterectomy compared to abdominal approach.  - Provided information on seasonal allergies and their potential impact on ear sensations.  - Ms. Garvey to perform plantar fasciitis stretches, especially before first steps in the morning.  - Recommend using frozen water bottle to roll foot for inflammation relief in evenings.  - Ms. Garvey to apply heat before stretching to improve flexibility.  - Ms. Garvey to continue carbohydrate-conscious diet and exercise regimen for blood sugar management.  - Ms. Garvey can consider using antihistamines or nasal spray for allergy symptoms if needed.  - Continued Motrin 400 mg twice daily for plantar fasciitis pain, with instructions to use only as needed and monitor for stomach discomfort.  - Continued iron supplementation for 3 more months.  - Decreased vitamin D supplementation to once weekly for 6 months.  - Ultrasound ordered to assess endometrial thickness.  - Vitamin D level check ordered in 6 months.  - Recommend scheduling colonoscopy in the next 1-2 months if hysterectomy is not performed.  - Follow up in 6 months for lab work, including vitamin D level check.  - Contact office if ultrasound results indicate very thick endometrial lining to discuss potential need for hysterectomy.         Time spent with patient: 20 minutes  Patient with be reevaluated in 6 months or sooner prn  Greater than 50% of this visit was spent counseling as described in above documentation:Yes  This note was generated with the assistance of ambient listening technology. Verbal consent was obtained by the patient and accompanying visitor(s) for the recording of patient appointment to  facilitate this note. I attest to having reviewed and edited the generated note for accuracy, though some syntax or spelling errors may persist. Please contact the author of this note for any clarification.

## 2024-10-28 ENCOUNTER — OFFICE VISIT (OUTPATIENT)
Dept: PODIATRY | Facility: CLINIC | Age: 49
End: 2024-10-28
Payer: COMMERCIAL

## 2024-10-28 VITALS — WEIGHT: 141.13 LBS | BODY MASS INDEX: 24.1 KG/M2 | HEIGHT: 64 IN

## 2024-10-28 DIAGNOSIS — M77.51 CAPSULITIS OF METATARSOPHALANGEAL (MTP) JOINT OF RIGHT FOOT: Primary | ICD-10-CM

## 2024-10-28 DIAGNOSIS — M79.671 RIGHT FOOT PAIN: ICD-10-CM

## 2024-10-28 PROCEDURE — 1160F RVW MEDS BY RX/DR IN RCRD: CPT | Mod: CPTII,S$GLB,, | Performed by: PODIATRIST

## 2024-10-28 PROCEDURE — 99999 PR PBB SHADOW E&M-EST. PATIENT-LVL III: CPT | Mod: PBBFAC,,, | Performed by: PODIATRIST

## 2024-10-28 PROCEDURE — 1159F MED LIST DOCD IN RCRD: CPT | Mod: CPTII,S$GLB,, | Performed by: PODIATRIST

## 2024-10-28 PROCEDURE — 3008F BODY MASS INDEX DOCD: CPT | Mod: CPTII,S$GLB,, | Performed by: PODIATRIST

## 2024-10-28 PROCEDURE — 3044F HG A1C LEVEL LT 7.0%: CPT | Mod: CPTII,S$GLB,, | Performed by: PODIATRIST

## 2024-10-28 PROCEDURE — 99213 OFFICE O/P EST LOW 20 MIN: CPT | Mod: S$GLB,,, | Performed by: PODIATRIST

## 2024-11-01 ENCOUNTER — PATIENT MESSAGE (OUTPATIENT)
Dept: PODIATRY | Facility: CLINIC | Age: 49
End: 2024-11-01
Payer: COMMERCIAL

## 2024-11-01 DIAGNOSIS — M79.671 RIGHT FOOT PAIN: Primary | ICD-10-CM

## 2024-11-01 RX ORDER — METHYLPREDNISOLONE 4 MG/1
TABLET ORAL
Qty: 21 EACH | Refills: 0 | Status: SHIPPED | OUTPATIENT
Start: 2024-11-01 | End: 2024-11-22

## 2024-11-11 ENCOUNTER — HOSPITAL ENCOUNTER (OUTPATIENT)
Dept: RADIOLOGY | Facility: CLINIC | Age: 49
Discharge: HOME | End: 2024-11-11
Attending: PODIATRIST
Payer: COMMERCIAL

## 2024-11-11 ENCOUNTER — OFFICE VISIT (OUTPATIENT)
Dept: PODIATRY | Facility: CLINIC | Age: 49
End: 2024-11-11
Payer: COMMERCIAL

## 2024-11-11 VITALS — BODY MASS INDEX: 24.22 KG/M2 | WEIGHT: 141.13 LBS

## 2024-11-11 DIAGNOSIS — M77.51 CAPSULITIS OF METATARSOPHALANGEAL (MTP) JOINT OF RIGHT FOOT: Primary | ICD-10-CM

## 2024-11-11 DIAGNOSIS — M79.671 RIGHT FOOT PAIN: ICD-10-CM

## 2024-11-11 PROCEDURE — 3008F BODY MASS INDEX DOCD: CPT | Mod: CPTII,S$GLB,, | Performed by: PODIATRIST

## 2024-11-11 PROCEDURE — 99213 OFFICE O/P EST LOW 20 MIN: CPT | Mod: S$GLB,,, | Performed by: PODIATRIST

## 2024-11-11 PROCEDURE — 99999 PR PBB SHADOW E&M-EST. PATIENT-LVL III: CPT | Mod: PBBFAC,,, | Performed by: PODIATRIST

## 2024-11-11 PROCEDURE — 3044F HG A1C LEVEL LT 7.0%: CPT | Mod: CPTII,S$GLB,, | Performed by: PODIATRIST

## 2024-11-11 PROCEDURE — 73630 X-RAY EXAM OF FOOT: CPT | Mod: RT,S$GLB,, | Performed by: RADIOLOGY

## 2024-11-11 PROCEDURE — 1159F MED LIST DOCD IN RCRD: CPT | Mod: CPTII,S$GLB,, | Performed by: PODIATRIST

## 2024-11-11 PROCEDURE — 1160F RVW MEDS BY RX/DR IN RCRD: CPT | Mod: CPTII,S$GLB,, | Performed by: PODIATRIST

## 2024-11-11 NOTE — PROGRESS NOTES
[address] [facility] [place] [place] [address]  Phone: [phone]   [phone]    Patient's PCP:Carina He MD  Referring Provider: No ref. provider found    Subjective:      Chief Complaint:: Follow-up (Capsulitis, RT)    Follow-up  Associated symptoms include arthralgias. Pertinent negatives include no abdominal pain, chest pain, chills, coughing, fatigue, fever, headaches, joint swelling, myalgias, nausea, neck pain, numbness, rash or weakness.       Preeti Garvey is a 49 y.o. female who presents today for follow up of Capsulitis, RT. PT states the pain has increased in the RT foot. PT presents today in walking boot. PT states she took the medication which helped her while she was taking it. PT states now she feels as if the pain has increased.          Vitals:    11/11/24 0833   Weight: 64 kg (141 lb 1.5 oz)   PainSc:   6      Shoe Size: 7.5    History reviewed. No pertinent surgical history.  History reviewed. No pertinent past medical history.  Family History   Problem Relation Name Age of Onset    Melanoma Neg Hx      Psoriasis Neg Hx      Lupus Neg Hx      Eczema Neg Hx          Social History:   Marital Status:   Alcohol History:  reports current alcohol use.  Tobacco History:  reports that she has never smoked. She has never used smokeless tobacco.  Drug History:  reports no history of drug use.    Review of patient's allergies indicates:   Allergen Reactions    Bactrim [sulfamethoxazole-trimethoprim] Rash       Current Outpatient Medications   Medication Sig Dispense Refill    apremilast (OTEZLA) 30 mg Tab Take 30 mg by mouth 2 (two) times daily.      cetirizine (ZYRTEC) 10 mg Cap Take 1 capsule by mouth once daily. (Patient not taking: Reported on 8/6/2024) 30 capsule 11    clotrimazole-betamethasone 1-0.05% (LOTRISONE) cream Apply topically 2 (two) times daily. 45 g 3    desonide (DESOWEN) 0.05 % cream APPLY TOPICALLY TO THE AFFECTED AREA ON CHIN TWICE DAILY AS NEEDED  (Patient not taking: Reported on 8/6/2024)      ergocalciferol (ERGOCALCIFEROL) 50,000 unit Cap Take 1 capsule (50,000 Units total) by mouth every 7 days. 12 capsule 1    ferrous sulfate 324 mg (65 mg iron) TbEC Take 1 tablet (324 mg total) by mouth once daily. 90 tablet 3    medroxyPROGESTERone (PROVERA) 10 MG tablet Take 1 tablet by mouth 2 (two) times daily.      meloxicam (MOBIC) 15 MG tablet Take 1 tablet (15 mg total) by mouth once daily. 30 tablet 3    methylPREDNISolone (MEDROL DOSEPACK) 4 mg tablet use as directed 21 each 0    pimecrolimus (ELIDEL) 1 % cream 2 (two) times daily. apply to affected area      progesterone (PROMETRIUM) 100 MG capsule Take 1 po qd 10 days per month then off. Continue same days of month cyclically 30 capsule 3     No current facility-administered medications for this visit.       Review of Systems   Constitutional:  Negative for chills, fatigue, fever and unexpected weight change.   HENT:  Negative for hearing loss and trouble swallowing.    Eyes:  Negative for photophobia and visual disturbance.   Respiratory:  Negative for cough, shortness of breath and wheezing.    Cardiovascular:  Negative for chest pain, palpitations and leg swelling.   Gastrointestinal:  Negative for abdominal pain and nausea.   Genitourinary:  Negative for dysuria and frequency.   Musculoskeletal:  Positive for arthralgias. Negative for back pain, gait problem, joint swelling, myalgias and neck pain.   Skin:  Negative for rash and wound.   Neurological:  Negative for tremors, seizures, weakness, numbness and headaches.   Hematological:  Does not bruise/bleed easily.   Psychiatric/Behavioral:  Negative for hallucinations.          Objective:        Physical Exam:   Foot Exam    General  General Appearance: appears stated age and healthy   Orientation: alert and oriented to person, place, and time   Affect: appropriate   Gait: antalgic       Right Foot/Ankle     Inspection and Palpation  Ecchymosis:  none  Tenderness: lesser metatarsophalangeal joints   Swelling: lesser metatarsophalangeal joints   Arch: normal  Skin Exam: skin intact; no drainage, no ulcer and no erythema   Neurovascular  Dorsalis pedis: 2+  Posterior tibial: 2+  Capillary Refill: 2+  Varicose veins: not present  Saphenous nerve sensation: normal  Tibial nerve sensation: normal  Superficial peroneal nerve sensation: normal  Deep peroneal nerve sensation: normal  Sural nerve sensation: normal    Edema  Type of edema: non-pitting    Muscle Strength  Ankle dorsiflexion: 5  Ankle plantar flexion: 5  Ankle inversion: 5  Ankle eversion: 5  Great toe extension: 5  Great toe flexion: 5    Range of Motion    Normal right ankle ROM    Tests  Anterior drawer: negative   Talar tilt: negative   PT Tinel's sign: negative    Paresthesia: negative        Physical Exam  Cardiovascular:      Pulses:           Dorsalis pedis pulses are 2+ on the right side.        Posterior tibial pulses are 2+ on the right side.   Feet:      Right foot:      Skin integrity: No ulcer or erythema.               Right Ankle/Foot Exam     Swelling   The patient is swollen on the lesser metatarsophalangeal joints.    Tenderness   The patient is tender to palpation of the lesser metatarsophalangeal joints.    Range of Motion   The patient has normal right ankle ROM.        Muscle Strength   Right Lower Extremity   Ankle Dorsiflexion:  5   Plantar flexion:  5/5    Vascular Exam     Right Pulses  Dorsalis Pedis:      2+  Posterior Tibial:      2+           Imaging: X-Ray Foot Complete Right  Narrative: EXAMINATION:  XR FOOT COMPLETE 3 VIEW RIGHT    CLINICAL HISTORY:  . Pain in right foot    TECHNIQUE:  AP, lateral, and oblique views of the right foot were performed.    COMPARISON:  None    FINDINGS:  No fracture or dislocation. No significant degenerative change. The soft tissues are unremarkable.  Impression: No significant pathology.    Electronically signed by: Gene Stapleton  MD  Date:    11/11/2024  Time:    09:00               Assessment:       1. Capsulitis of metatarsophalangeal (MTP) joint of right foot    2. Right foot pain      Plan:   Capsulitis of metatarsophalangeal (MTP) joint of right foot  -     MRI Forefoot WO Contrast RT; Future; Expected date: 11/11/2024    Right foot pain  -     MRI Forefoot WO Contrast RT; Future; Expected date: 11/11/2024  -     X-Ray Foot Complete Right      Follow up if symptoms worsen or fail to improve, for pending MRI results.    Procedures        I discussed with the patient that given no improvement in her symptoms despite oral anti-inflammatories, oral steroids, and boot cast immobilization I am going to order an MRI for further evaluation.  Recommend she continue in the cam walker boot for now.  Also recommend she continue icing.    Counseling:     I provided patient education verbally regarding:   Patient diagnosis, treatment options, as well as alternatives, risks, and benefits.     This note was created using Dragon voice recognition software that occasionally misinterpreted phrases or words.

## 2024-11-12 ENCOUNTER — TELEPHONE (OUTPATIENT)
Dept: PODIATRY | Facility: CLINIC | Age: 49
End: 2024-11-12
Payer: COMMERCIAL

## 2024-11-12 DIAGNOSIS — M77.51 CAPSULITIS OF METATARSOPHALANGEAL (MTP) JOINT OF RIGHT FOOT: Primary | ICD-10-CM

## 2024-11-12 DIAGNOSIS — M79.671 RIGHT FOOT PAIN: ICD-10-CM

## 2024-11-12 NOTE — TELEPHONE ENCOUNTER
----- Message from Tech Azalea sent at 11/12/2024  1:17 PM CST -----  Regarding: orders  Patient called asking if Dr BRUMFIELD could send the orders to [facility] for MRI. Due to scheduling issue.  The fax number to [facility] is [phone]   Please advise

## 2024-11-13 ENCOUNTER — PATIENT MESSAGE (OUTPATIENT)
Dept: PODIATRY | Facility: CLINIC | Age: 49
End: 2024-11-13
Payer: COMMERCIAL

## 2024-11-19 ENCOUNTER — PATIENT MESSAGE (OUTPATIENT)
Dept: PODIATRY | Facility: CLINIC | Age: 49
End: 2024-11-19
Payer: COMMERCIAL

## 2024-11-19 ENCOUNTER — LAB VISIT (OUTPATIENT)
Dept: LAB | Facility: HOSPITAL | Age: 49
End: 2024-11-19
Attending: FAMILY MEDICINE
Payer: COMMERCIAL

## 2024-11-19 ENCOUNTER — OFFICE VISIT (OUTPATIENT)
Dept: FAMILY MEDICINE | Facility: CLINIC | Age: 49
End: 2024-11-19
Payer: COMMERCIAL

## 2024-11-19 VITALS
SYSTOLIC BLOOD PRESSURE: 124 MMHG | RESPIRATION RATE: 17 BRPM | DIASTOLIC BLOOD PRESSURE: 60 MMHG | HEART RATE: 90 BPM | OXYGEN SATURATION: 99 % | WEIGHT: 141.56 LBS | HEIGHT: 64 IN | BODY MASS INDEX: 24.17 KG/M2

## 2024-11-19 DIAGNOSIS — M25.461 BILATERAL KNEE EFFUSIONS: ICD-10-CM

## 2024-11-19 DIAGNOSIS — M25.462 BILATERAL KNEE EFFUSIONS: Primary | ICD-10-CM

## 2024-11-19 DIAGNOSIS — M25.462 BILATERAL KNEE EFFUSIONS: ICD-10-CM

## 2024-11-19 DIAGNOSIS — M25.461 BILATERAL KNEE EFFUSIONS: Primary | ICD-10-CM

## 2024-11-19 LAB
CRP SERPL-MCNC: 29.3 MG/L (ref 0–8.2)
ERYTHROCYTE [SEDIMENTATION RATE] IN BLOOD BY WESTERGREN METHOD: 26 MM/HR (ref 0–20)
URATE SERPL-MCNC: 4 MG/DL (ref 2.4–5.7)

## 2024-11-19 PROCEDURE — 99213 OFFICE O/P EST LOW 20 MIN: CPT | Mod: 25,S$GLB,, | Performed by: FAMILY MEDICINE

## 2024-11-19 PROCEDURE — 86235 NUCLEAR ANTIGEN ANTIBODY: CPT | Performed by: FAMILY MEDICINE

## 2024-11-19 PROCEDURE — 85651 RBC SED RATE NONAUTOMATED: CPT | Mod: PO | Performed by: FAMILY MEDICINE

## 2024-11-19 PROCEDURE — 86225 DNA ANTIBODY NATIVE: CPT | Mod: 59 | Performed by: FAMILY MEDICINE

## 2024-11-19 PROCEDURE — 3008F BODY MASS INDEX DOCD: CPT | Mod: CPTII,S$GLB,, | Performed by: FAMILY MEDICINE

## 2024-11-19 PROCEDURE — 99999 PR PBB SHADOW E&M-EST. PATIENT-LVL IV: CPT | Mod: PBBFAC,,, | Performed by: FAMILY MEDICINE

## 2024-11-19 PROCEDURE — 1159F MED LIST DOCD IN RCRD: CPT | Mod: CPTII,S$GLB,, | Performed by: FAMILY MEDICINE

## 2024-11-19 PROCEDURE — 36415 COLL VENOUS BLD VENIPUNCTURE: CPT | Mod: PO | Performed by: FAMILY MEDICINE

## 2024-11-19 PROCEDURE — 86431 RHEUMATOID FACTOR QUANT: CPT | Performed by: FAMILY MEDICINE

## 2024-11-19 PROCEDURE — 3044F HG A1C LEVEL LT 7.0%: CPT | Mod: CPTII,S$GLB,, | Performed by: FAMILY MEDICINE

## 2024-11-19 PROCEDURE — 86039 ANTINUCLEAR ANTIBODIES (ANA): CPT | Performed by: FAMILY MEDICINE

## 2024-11-19 PROCEDURE — 86140 C-REACTIVE PROTEIN: CPT | Performed by: FAMILY MEDICINE

## 2024-11-19 PROCEDURE — 1160F RVW MEDS BY RX/DR IN RCRD: CPT | Mod: CPTII,S$GLB,, | Performed by: FAMILY MEDICINE

## 2024-11-19 PROCEDURE — 86038 ANTINUCLEAR ANTIBODIES: CPT | Performed by: FAMILY MEDICINE

## 2024-11-19 PROCEDURE — 3078F DIAST BP <80 MM HG: CPT | Mod: CPTII,S$GLB,, | Performed by: FAMILY MEDICINE

## 2024-11-19 PROCEDURE — 84550 ASSAY OF BLOOD/URIC ACID: CPT | Performed by: FAMILY MEDICINE

## 2024-11-19 PROCEDURE — 96372 THER/PROPH/DIAG INJ SC/IM: CPT | Mod: S$GLB,,, | Performed by: FAMILY MEDICINE

## 2024-11-19 PROCEDURE — 3074F SYST BP LT 130 MM HG: CPT | Mod: CPTII,S$GLB,, | Performed by: FAMILY MEDICINE

## 2024-11-19 RX ORDER — BETAMETHASONE SODIUM PHOSPHATE AND BETAMETHASONE ACETATE 3; 3 MG/ML; MG/ML
9 INJECTION, SUSPENSION INTRA-ARTICULAR; INTRALESIONAL; INTRAMUSCULAR; SOFT TISSUE
Status: COMPLETED | OUTPATIENT
Start: 2024-11-19 | End: 2024-11-19

## 2024-11-19 RX ADMIN — BETAMETHASONE SODIUM PHOSPHATE AND BETAMETHASONE ACETATE 9 MG: 3; 3 INJECTION, SUSPENSION INTRA-ARTICULAR; INTRALESIONAL; INTRAMUSCULAR; SOFT TISSUE at 03:11

## 2024-11-19 NOTE — PROGRESS NOTES
Subjective:       Patient ID: Preeti Garvey is a 49 y.o. female.    Chief Complaint: No chief complaint on file.    Patient here for UC visit.  2 day onset of swelling/tightness and pain in b/l knees and in right index finger.  Has been on Meloxicam for plantar fasciitis for approx 7 days prior.  No recent Abx or illness otherwise.  Other meds have been used for long term.  On Otezla for Psoriasis.        Review of Systems   Constitutional:  Negative for fever.   Respiratory:  Negative for shortness of breath.    Cardiovascular:  Negative for chest pain.   Gastrointestinal:  Negative for abdominal pain and nausea.   Musculoskeletal:  Positive for arthralgias.   Skin:  Negative for rash.   All other systems reviewed and are negative.      Objective:      Physical Exam  Vitals reviewed.   Musculoskeletal:      Right hand: Swelling (right index finger) present.      Right knee: Swelling and effusion present. Tenderness present.      Left knee: Swelling and effusion present. Tenderness present.      Right lower leg: Edema present.      Left lower leg: Edema present.   Skin:     General: Skin is warm and dry.   Neurological:      Mental Status: She is alert.         Assessment:       1. Bilateral knee effusions        Plan:       Bilateral knee effusions  -     Sedimentation rate; Future; Expected date: 11/19/2024  -     C-REACTIVE PROTEIN; Future; Expected date: 11/19/2024  -     RHEUMATOID FACTOR; Future; Expected date: 11/19/2024  -     DAIJA; Future; Expected date: 11/19/2024  -     URIC ACID; Future; Expected date: 11/19/2024  -     betamethasone acetate-betamethasone sodium phosphate injection 9 mg      Patient Instructions   Rest; elevate knees.    Contact your PCP if any worsening or for any new concerns as we discussed.

## 2024-11-19 NOTE — PATIENT INSTRUCTIONS
Rest; elevate knees.    Contact your PCP if any worsening or for any new concerns as we discussed.

## 2024-11-20 LAB
ANA SER QL IF: POSITIVE
RHEUMATOID FACT SERPL-ACNC: <13 IU/ML (ref 0–15)

## 2024-11-21 ENCOUNTER — TELEPHONE (OUTPATIENT)
Dept: FAMILY MEDICINE | Facility: CLINIC | Age: 49
End: 2024-11-21
Payer: COMMERCIAL

## 2024-11-21 LAB
ANA PATTERN 1: NORMAL
ANA TITR SER IF: NORMAL {TITER}
DNA TITER: 0

## 2024-11-21 NOTE — TELEPHONE ENCOUNTER
Spoke to patient    She was seen by Dr Jenkins on 11/19/2024.   He is referring the patient back to Dr He.   Please see her labs.   Patient was offered an appointment with [facility] she refused.  She wants a message sent to Dr He   She states her swelling is worse  She needs a walker.   She cannot walk    Please advise        ----- Message from Stephon sent at 11/21/2024 12:17 PM CST -----  Contact: Self  Type: Needs Medical Advice    Who Called:  Patient    Best Call Back Number: [phone]    Additional Information: Patient is requesting a call back, wanting to know if the results have been received, and the swelling has not gone down. She is wanting to know what the next steps should be.

## 2024-11-21 NOTE — TELEPHONE ENCOUNTER
Appears to be having a severe flare of psoriatic arthritis. Needs eval and probably high dose prednisone taper over 1-2 weeks. Referral to rheum Dr. Montelongo is also needed.  Recommend she be seen by me or PORTIA asap to start these interventions

## 2024-11-22 ENCOUNTER — OFFICE VISIT (OUTPATIENT)
Dept: FAMILY MEDICINE | Facility: CLINIC | Age: 49
End: 2024-11-22
Payer: COMMERCIAL

## 2024-11-22 ENCOUNTER — TELEPHONE (OUTPATIENT)
Dept: FAMILY MEDICINE | Facility: CLINIC | Age: 49
End: 2024-11-22

## 2024-11-22 VITALS
SYSTOLIC BLOOD PRESSURE: 110 MMHG | OXYGEN SATURATION: 98 % | HEIGHT: 64 IN | RESPIRATION RATE: 16 BRPM | BODY MASS INDEX: 23.68 KG/M2 | WEIGHT: 138.69 LBS | HEART RATE: 105 BPM | DIASTOLIC BLOOD PRESSURE: 70 MMHG

## 2024-11-22 DIAGNOSIS — L40.9 PSORIASIS: ICD-10-CM

## 2024-11-22 DIAGNOSIS — M25.561 ACUTE PAIN OF BOTH KNEES: Primary | ICD-10-CM

## 2024-11-22 DIAGNOSIS — R76.8 POSITIVE ANA (ANTINUCLEAR ANTIBODY): ICD-10-CM

## 2024-11-22 DIAGNOSIS — M25.562 ACUTE PAIN OF BOTH KNEES: Primary | ICD-10-CM

## 2024-11-22 LAB
ANTI SM ANTIBODY: 0.08 RATIO (ref 0–0.99)
ANTI SM/RNP ANTIBODY: 0.06 RATIO (ref 0–0.99)
ANTI-SM INTERPRETATION: NEGATIVE
ANTI-SM/RNP INTERPRETATION: NEGATIVE
ANTI-SSA ANTIBODY: 0.06 RATIO (ref 0–0.99)
ANTI-SSA INTERPRETATION: NEGATIVE
ANTI-SSB ANTIBODY: 0.07 RATIO (ref 0–0.99)
ANTI-SSB INTERPRETATION: NEGATIVE
DSDNA AB SER-ACNC: NORMAL [IU]/ML

## 2024-11-22 PROCEDURE — 3078F DIAST BP <80 MM HG: CPT | Mod: CPTII,S$GLB,,

## 2024-11-22 PROCEDURE — 99214 OFFICE O/P EST MOD 30 MIN: CPT | Mod: S$GLB,,,

## 2024-11-22 PROCEDURE — 1159F MED LIST DOCD IN RCRD: CPT | Mod: CPTII,S$GLB,,

## 2024-11-22 PROCEDURE — 3044F HG A1C LEVEL LT 7.0%: CPT | Mod: CPTII,S$GLB,,

## 2024-11-22 PROCEDURE — 3074F SYST BP LT 130 MM HG: CPT | Mod: CPTII,S$GLB,,

## 2024-11-22 PROCEDURE — 99999 PR PBB SHADOW E&M-EST. PATIENT-LVL V: CPT | Mod: PBBFAC,,,

## 2024-11-22 PROCEDURE — 3008F BODY MASS INDEX DOCD: CPT | Mod: CPTII,S$GLB,,

## 2024-11-22 PROCEDURE — 1160F RVW MEDS BY RX/DR IN RCRD: CPT | Mod: CPTII,S$GLB,,

## 2024-11-22 RX ORDER — HYDROCODONE BITARTRATE AND ACETAMINOPHEN 5; 325 MG/1; MG/1
1 TABLET ORAL EVERY 8 HOURS PRN
Qty: 21 TABLET | Refills: 0 | Status: SHIPPED | OUTPATIENT
Start: 2024-11-22

## 2024-11-22 RX ORDER — PREDNISONE 10 MG/1
TABLET ORAL
Qty: 47 TABLET | Refills: 0 | Status: SHIPPED | OUTPATIENT
Start: 2024-11-22

## 2024-11-22 RX ORDER — IBUPROFEN 800 MG/1
800 TABLET ORAL EVERY 8 HOURS PRN
Qty: 45 TABLET | Refills: 0 | Status: SHIPPED | OUTPATIENT
Start: 2024-11-22

## 2024-11-22 NOTE — TELEPHONE ENCOUNTER
----- Message from Griffin sent at 11/22/2024  9:07 AM CST -----  Type: Needs Medical Advice  Who Called:  pt  Pharmacy name and phone #:    Best Call Back Number: [phone]  Additional Information: pt is calling the office for Dr Jenkins or benoit caldera to call her about a refferal to see a rhuematologist(Dr Brown) please call pt back thanks

## 2024-11-22 NOTE — TELEPHONE ENCOUNTER
----- Message from Griffin sent at 11/22/2024  9:07 AM CST -----  Type: Needs Medical Advice  Who Called:  pt  Pharmacy name and phone #:    Best Call Back Number: [phone]  Additional Information: pt is calling the office for Dr Jenkins or benoit caldera to call her about a refferal to see a rhuematologist(Dr Brown) please call pt back thanks

## 2024-11-22 NOTE — PROGRESS NOTES
Subjective:       Patient ID: Preeti Garvey is a 49 y.o. female.    Chief Complaint: Bilateral knee pain    Patient presents to the clinic with complaint of bilateral knee pain and abnormal labs.     Patient Active Problem List:     Allergic rhinitis due to pollen     Muscle spasm     Psoriasis     Iron deficiency anemia     Drug-induced immunodeficiency     DUB (dysfunctional uterine bleeding)     Vitamin D deficiency     Hyperglycemia    Bilateral knee pain started Sunday. She saw Dr. Jenkins on Tuesday and was given 9 mg Betamethasone. This helped slightly for one day. She also has been taking Mobic recently for foot pain but stopped due to thinking this may have worsened her pain. States bilateral knee pain has worsened to be point she can hardly walk. She is having to use a walker. She would like a Rheumatology referral.     Patient educated on plan of care, verbalized understanding.        Review of Systems   Constitutional:  Negative for activity change, appetite change, chills, diaphoresis and fever.   HENT:  Negative for congestion, ear pain, postnasal drip, sinus pressure, sneezing and sore throat.    Eyes:  Negative for pain, discharge, redness and itching.   Respiratory:  Negative for apnea, cough, chest tightness, shortness of breath and wheezing.    Cardiovascular:  Negative for chest pain and leg swelling.   Gastrointestinal:  Negative for abdominal distention, abdominal pain, constipation, diarrhea, nausea and vomiting.   Genitourinary:  Negative for difficulty urinating, dysuria, flank pain and frequency.   Musculoskeletal:  Positive for arthralgias, gait problem and myalgias.        Bilateral knee pain   Skin:  Negative for color change, rash and wound.   Neurological:  Negative for dizziness.   All other systems reviewed and are negative.      Patient Active Problem List   Diagnosis    Allergic rhinitis due to pollen    Muscle spasm    Psoriasis    Iron deficiency anemia    Drug-induced  "immunodeficiency    DUB (dysfunctional uterine bleeding)    Vitamin D deficiency    Hyperglycemia       Objective:      Physical Exam  Vitals and nursing note reviewed.   Constitutional:       General: She is not in acute distress.     Appearance: Normal appearance. She is well-developed.   HENT:      Head: Normocephalic.      Nose: Nose normal.   Eyes:      Conjunctiva/sclera: Conjunctivae normal.      Pupils: Pupils are equal, round, and reactive to light.   Cardiovascular:      Rate and Rhythm: Normal rate.   Pulmonary:      Effort: Pulmonary effort is normal. No respiratory distress.   Musculoskeletal:      Cervical back: Normal range of motion.      Right knee: No swelling. Tenderness present.      Left knee: No swelling. Tenderness present.        Legs:    Skin:     General: Skin is warm and dry.      Findings: No rash.   Neurological:      Mental Status: She is alert and oriented to person, place, and time.   Psychiatric:         Behavior: Behavior normal.         Lab Results   Component Value Date    WBC 5.61 09/07/2024    HGB 13.1 09/07/2024    HCT 40.3 09/07/2024     09/07/2024    CHOL 156 09/07/2024    TRIG 116 09/07/2024    HDL 44 09/07/2024    ALT 13 09/07/2024    AST 16 09/07/2024     09/07/2024    K 3.7 09/07/2024     09/07/2024    CREATININE 0.7 09/07/2024    BUN 8 09/07/2024    CO2 21 (L) 09/07/2024    TSH 1.362 09/07/2024    HGBA1C 5.6 09/07/2024     The 10-year ASCVD risk score (Grey DK, et al., 2019) is: 1.1%    Values used to calculate the score:      Age: 49 years      Sex: Female      Is Non- : Yes      Diabetic: No      Tobacco smoker: No      Systolic Blood Pressure: 110 mmHg      Is BP treated: No      HDL Cholesterol: 44 mg/dL      Total Cholesterol: 156 mg/dL  Visit Vitals  /70 (BP Location: Right arm, Patient Position: Sitting)   Pulse 105   Resp 16   Ht 5' 4" (1.626 m)   Wt 62.9 kg (138 lb 10.7 oz)   LMP 11/19/2024 (Exact Date)   SpO2 " 98%   BMI 23.80 kg/m²      Assessment:       1. Acute pain of both knees    2. Positive DAIJA (antinuclear antibody)    3. Psoriasis        Plan:       1. Acute pain of both knees  -     predniSONE (DELTASONE) 10 MG tablet; 60 mg orally once daily for 5 days, then 50 mg once daily for 1 day, then 40 mg once daily for 1 day, then 30 mg once daily for 1 day, then 20 mg once daily for 1 day, then 10 mg once daily for 1 day, then stop  Dispense: 47 tablet; Refill: 0  -     Ambulatory referral/consult to Rheumatology; Future; Expected date: 11/29/2024  -     Ambulatory referral/consult to Rheumatology; Future; Expected date: 11/29/2024  -     ibuprofen (ADVIL,MOTRIN) 800 MG tablet; Take 1 tablet (800 mg total) by mouth every 8 (eight) hours as needed for Pain.  Dispense: 45 tablet; Refill: 0  -     HYDROcodone-acetaminophen (NORCO) 5-325 mg per tablet; Take 1 tablet by mouth every 8 (eight) hours as needed for Pain.  Dispense: 21 tablet; Refill: 0    2. Positive DAIJA (antinuclear antibody)  -     Ambulatory referral/consult to Rheumatology; Future; Expected date: 11/29/2024  -     Ambulatory referral/consult to Rheumatology; Future; Expected date: 11/29/2024    3. Psoriasis  -     Ambulatory referral/consult to Rheumatology; Future; Expected date: 11/29/2024  -     Ambulatory referral/consult to Rheumatology; Future; Expected date: 11/29/2024       Follow up if symptoms worsen or fail to improve.      Future Appointments       Date Provider Specialty Appt Notes    12/6/2024 Radha Schaeffer NP Family Medicine 2 week follow up    4/17/2025  Merry caldera    4/25/2025 Carina Caldera MD Family Medicine 6 month f/u    10/24/2025 Carina Caldera MD Family Medicine annual

## 2024-11-25 ENCOUNTER — TELEPHONE (OUTPATIENT)
Dept: RHEUMATOLOGY | Facility: CLINIC | Age: 49
End: 2024-11-25
Payer: COMMERCIAL

## 2024-11-25 NOTE — TELEPHONE ENCOUNTER
----- Message from Vane sent at 11/25/2024 10:13 AM CST -----  Contact: Patient  Type:  Needs Medical Advice    Who Called:   Patient    Would the patient rather a call back or a response via [facility]?   Call back  Best Call Back Number:   [phone]    Additional Information:   States she would like to schedule an appointment related to the referral that was sent over on Friday, 11/22 as well as message through [employer] - states her brother-in-law is Omi Bowens - please call to schedule appointment - thank you

## 2024-11-26 NOTE — PATIENT INSTRUCTIONS
Thank you for allowing me to be part of your healthcare team at [facility]. It is a pleasure to care for you today.   Please take all of your medications as instructed and follow all new instructions from your visit today.  If you received labs or medical tests today you should hear information about results or scheduling either by phone or mychart within approximately a week.   If you have any questions or concerns please do not hesitate to call. Have a blessed day and I hope to see you again soon.  RAIMUNDO Toribio      WE STRIVE FOR 5'S!!!        We strive for exceptional care. Please fill out a survey if you received 5 star service.

## 2024-12-02 ENCOUNTER — PATIENT MESSAGE (OUTPATIENT)
Dept: FAMILY MEDICINE | Facility: CLINIC | Age: 49
End: 2024-12-02
Payer: COMMERCIAL

## 2024-12-02 ENCOUNTER — PATIENT MESSAGE (OUTPATIENT)
Dept: PODIATRY | Facility: CLINIC | Age: 49
End: 2024-12-02
Payer: COMMERCIAL

## 2024-12-02 ENCOUNTER — TELEPHONE (OUTPATIENT)
Dept: PODIATRY | Facility: CLINIC | Age: 49
End: 2024-12-02
Payer: COMMERCIAL

## 2024-12-02 DIAGNOSIS — M25.562 ACUTE PAIN OF BOTH KNEES: Primary | ICD-10-CM

## 2024-12-02 DIAGNOSIS — M25.561 ACUTE PAIN OF BOTH KNEES: Primary | ICD-10-CM

## 2024-12-02 NOTE — TELEPHONE ENCOUNTER
Spoke to patient who states that she will continue to wear the boot and make an appointment for a week or two out if she is still having pain.

## 2024-12-02 NOTE — TELEPHONE ENCOUNTER
Referral faxed to fax number provided by patient.  Please view routing history in miscellaneous reports of patient record for reference of fax transmission.       Please see attached portal message from patient regarding continued swelling to finger. Thank you.

## 2024-12-04 ENCOUNTER — TELEPHONE (OUTPATIENT)
Dept: FAMILY MEDICINE | Facility: CLINIC | Age: 49
End: 2024-12-04
Payer: COMMERCIAL

## 2024-12-04 RX ORDER — PREDNISONE 10 MG/1
TABLET ORAL
Qty: 74 TABLET | Refills: 0 | Status: SHIPPED | OUTPATIENT
Start: 2024-12-04

## 2024-12-04 NOTE — TELEPHONE ENCOUNTER
Spoke to pt who states she spoke to a Mrs. Shah already and does not need any further assistance at this time

## 2024-12-04 NOTE — TELEPHONE ENCOUNTER
----- Message from Vane sent at 12/4/2024  1:15 PM CST -----  Contact: Patient  Type:  Needs Medical Advice    Who Called:  Patient    Pharmacy name and phone #:       [id number] - [place] - [address] [facility]  [address]  [place] [address]  Phone: [phone] Fax: [phone]    Would the patient rather a call back or a response via [facility]?   Call back  Best Call Back Number:   [phone]    Additional Information:   States she would like a call back from either Dr He or her nurse - states not feeling better and would like to discuss medication - states she is in a lot of pain and using  walker again - please call - thank you

## 2024-12-06 ENCOUNTER — LAB VISIT (OUTPATIENT)
Dept: LAB | Facility: HOSPITAL | Age: 49
End: 2024-12-06
Payer: COMMERCIAL

## 2024-12-06 ENCOUNTER — OFFICE VISIT (OUTPATIENT)
Dept: FAMILY MEDICINE | Facility: CLINIC | Age: 49
End: 2024-12-06
Payer: COMMERCIAL

## 2024-12-06 VITALS
RESPIRATION RATE: 16 BRPM | HEIGHT: 64 IN | WEIGHT: 144.63 LBS | OXYGEN SATURATION: 98 % | DIASTOLIC BLOOD PRESSURE: 68 MMHG | SYSTOLIC BLOOD PRESSURE: 128 MMHG | HEART RATE: 81 BPM | BODY MASS INDEX: 24.69 KG/M2

## 2024-12-06 DIAGNOSIS — R79.82 ELEVATED C-REACTIVE PROTEIN (CRP): Primary | ICD-10-CM

## 2024-12-06 DIAGNOSIS — R79.82 ELEVATED C-REACTIVE PROTEIN (CRP): ICD-10-CM

## 2024-12-06 DIAGNOSIS — M25.562 ACUTE PAIN OF BOTH KNEES: ICD-10-CM

## 2024-12-06 DIAGNOSIS — M25.561 ACUTE PAIN OF BOTH KNEES: ICD-10-CM

## 2024-12-06 DIAGNOSIS — L40.9 PSORIASIS: ICD-10-CM

## 2024-12-06 LAB
ALBUMIN SERPL BCP-MCNC: 3.3 G/DL (ref 3.5–5.2)
ALP SERPL-CCNC: 90 U/L (ref 40–150)
ALT SERPL W/O P-5'-P-CCNC: 17 U/L (ref 10–44)
ANION GAP SERPL CALC-SCNC: 12 MMOL/L (ref 8–16)
AST SERPL-CCNC: 17 U/L (ref 10–40)
BILIRUB SERPL-MCNC: 0.2 MG/DL (ref 0.1–1)
BUN SERPL-MCNC: 15 MG/DL (ref 6–20)
CALCIUM SERPL-MCNC: 9.4 MG/DL (ref 8.7–10.5)
CHLORIDE SERPL-SCNC: 104 MMOL/L (ref 95–110)
CO2 SERPL-SCNC: 20 MMOL/L (ref 23–29)
CREAT SERPL-MCNC: 0.7 MG/DL (ref 0.5–1.4)
CRP SERPL-MCNC: 7.3 MG/L (ref 0–8.2)
EST. GFR  (NO RACE VARIABLE): >60 ML/MIN/1.73 M^2
GLUCOSE SERPL-MCNC: 131 MG/DL (ref 70–110)
POTASSIUM SERPL-SCNC: 4 MMOL/L (ref 3.5–5.1)
PROT SERPL-MCNC: 7.2 G/DL (ref 6–8.4)
SODIUM SERPL-SCNC: 136 MMOL/L (ref 136–145)

## 2024-12-06 PROCEDURE — 3044F HG A1C LEVEL LT 7.0%: CPT | Mod: CPTII,S$GLB,,

## 2024-12-06 PROCEDURE — 36415 COLL VENOUS BLD VENIPUNCTURE: CPT | Mod: PO

## 2024-12-06 PROCEDURE — 3074F SYST BP LT 130 MM HG: CPT | Mod: CPTII,S$GLB,,

## 2024-12-06 PROCEDURE — 80053 COMPREHEN METABOLIC PANEL: CPT

## 2024-12-06 PROCEDURE — 3078F DIAST BP <80 MM HG: CPT | Mod: CPTII,S$GLB,,

## 2024-12-06 PROCEDURE — 85651 RBC SED RATE NONAUTOMATED: CPT | Mod: PO

## 2024-12-06 PROCEDURE — 3008F BODY MASS INDEX DOCD: CPT | Mod: CPTII,S$GLB,,

## 2024-12-06 PROCEDURE — 1160F RVW MEDS BY RX/DR IN RCRD: CPT | Mod: CPTII,S$GLB,,

## 2024-12-06 PROCEDURE — 99214 OFFICE O/P EST MOD 30 MIN: CPT | Mod: S$GLB,,,

## 2024-12-06 PROCEDURE — 1159F MED LIST DOCD IN RCRD: CPT | Mod: CPTII,S$GLB,,

## 2024-12-06 PROCEDURE — 86140 C-REACTIVE PROTEIN: CPT

## 2024-12-06 PROCEDURE — 99999 PR PBB SHADOW E&M-EST. PATIENT-LVL V: CPT | Mod: PBBFAC,,,

## 2024-12-06 NOTE — PROGRESS NOTES
Subjective:       Patient ID: Preeti Garvey is a 49 y.o. female.    Chief Complaint: Follow-up    Patient presents to the clinic for a 2 week follow up.     Patient Active Problem List:     Allergic rhinitis due to pollen     Muscle spasm     Psoriasis     Iron deficiency anemia     Drug-induced immunodeficiency     DUB (dysfunctional uterine bleeding)     Vitamin D deficiency     Hyperglycemia    She was seen 2 weeks ago for severe knee pain and elevated CRP and ESR. She was started on Prednisone x 2 weeks and pain improved. She was off the Prednisone for 2 days and pain returned. She has now started a longer taper of Prednisone and pain subsided within 1 day. She had gotten to the point where she could not walk or bend her knees. She has appt January 8th with Rheumatology- Dr. Montelongo.           Review of Systems   Constitutional:  Negative for activity change, appetite change, chills, diaphoresis and fever.   HENT:  Negative for congestion, ear pain, postnasal drip, sinus pressure, sneezing and sore throat.    Eyes:  Negative for pain, discharge, redness and itching.   Respiratory:  Negative for apnea, cough, chest tightness, shortness of breath and wheezing.    Cardiovascular:  Negative for chest pain and leg swelling.   Gastrointestinal:  Negative for abdominal distention, abdominal pain, constipation, diarrhea, nausea and vomiting.   Genitourinary:  Negative for difficulty urinating, dysuria, flank pain and frequency.   Musculoskeletal:  Positive for arthralgias and myalgias.   Skin:  Negative for color change, rash and wound.   Neurological:  Negative for dizziness.   All other systems reviewed and are negative.      Patient Active Problem List   Diagnosis    Allergic rhinitis due to pollen    Muscle spasm    Psoriasis    Iron deficiency anemia    Drug-induced immunodeficiency    DUB (dysfunctional uterine bleeding)    Vitamin D deficiency    Hyperglycemia       Objective:      Physical Exam  Vitals and  "nursing note reviewed.   Constitutional:       General: She is not in acute distress.     Appearance: Normal appearance. She is well-developed.   HENT:      Head: Normocephalic.      Nose: Nose normal.   Eyes:      Conjunctiva/sclera: Conjunctivae normal.      Pupils: Pupils are equal, round, and reactive to light.   Cardiovascular:      Rate and Rhythm: Normal rate.   Pulmonary:      Effort: Pulmonary effort is normal. No respiratory distress.   Musculoskeletal:      Cervical back: Normal range of motion.   Skin:     General: Skin is warm and dry.      Findings: No rash.   Neurological:      Mental Status: She is alert and oriented to person, place, and time.   Psychiatric:         Behavior: Behavior normal.         Lab Results   Component Value Date    WBC 5.61 09/07/2024    HGB 13.1 09/07/2024    HCT 40.3 09/07/2024     09/07/2024    CHOL 156 09/07/2024    TRIG 116 09/07/2024    HDL 44 09/07/2024    ALT 17 12/06/2024    AST 17 12/06/2024     12/06/2024    K 4.0 12/06/2024     12/06/2024    CREATININE 0.7 12/06/2024    BUN 15 12/06/2024    CO2 20 (L) 12/06/2024    TSH 1.362 09/07/2024    HGBA1C 5.6 09/07/2024     The 10-year ASCVD risk score (Grey JACOBS, et al., 2019) is: 2.1%    Values used to calculate the score:      Age: 49 years      Sex: Female      Is Non- : Yes      Diabetic: No      Tobacco smoker: No      Systolic Blood Pressure: 128 mmHg      Is BP treated: No      HDL Cholesterol: 44 mg/dL      Total Cholesterol: 156 mg/dL  Visit Vitals  /68 (BP Location: Right arm, Patient Position: Sitting)   Pulse 81   Resp 16   Ht 5' 4" (1.626 m)   Wt 65.6 kg (144 lb 10 oz)   LMP 11/19/2024 (Exact Date)   SpO2 98%   BMI 24.82 kg/m²      Assessment:       1. Elevated C-reactive protein (CRP)    2. Acute pain of both knees    3. Psoriasis        Plan:       1. Elevated C-reactive protein (CRP)/Acute pain of both knees/Psoriasis  -     COMPREHENSIVE METABOLIC PANEL; " Future; Expected date: 12/06/2024  -     Sedimentation rate; Future; Expected date: 12/06/2024  -     C-Reactive Protein; Future; Expected date: 12/06/2024   - Continue Motrin and Prednisone   - Follow up with Rheumatology   - The diagnosis, treatment plan, instructions for follow-up and reevaluation as well as ED precautions were discussed and understanding was verbalized. All questions or concerns have been addressed.        Follow up if symptoms worsen or fail to improve.      Future Appointments       Date Provider Specialty Appt Notes    4/17/2025  Lab verenice    4/25/2025 Carina He MD Family Medicine 6 month f/u    10/24/2025 Carina He MD Family Medicine annual

## 2025-01-13 ENCOUNTER — OFFICE VISIT (OUTPATIENT)
Dept: PODIATRY | Facility: CLINIC | Age: 50
End: 2025-01-13
Payer: COMMERCIAL

## 2025-01-13 VITALS — WEIGHT: 144.63 LBS | BODY MASS INDEX: 24.69 KG/M2 | RESPIRATION RATE: 16 BRPM | HEIGHT: 64 IN

## 2025-01-13 DIAGNOSIS — M77.51 CAPSULITIS OF METATARSOPHALANGEAL (MTP) JOINT OF RIGHT FOOT: Primary | ICD-10-CM

## 2025-01-13 DIAGNOSIS — M79.671 RIGHT FOOT PAIN: ICD-10-CM

## 2025-01-13 PROCEDURE — 1159F MED LIST DOCD IN RCRD: CPT | Mod: CPTII,S$GLB,, | Performed by: PODIATRIST

## 2025-01-13 PROCEDURE — 99999 PR PBB SHADOW E&M-EST. PATIENT-LVL IV: CPT | Mod: PBBFAC,,, | Performed by: PODIATRIST

## 2025-01-13 PROCEDURE — 1160F RVW MEDS BY RX/DR IN RCRD: CPT | Mod: CPTII,S$GLB,, | Performed by: PODIATRIST

## 2025-01-13 PROCEDURE — 99213 OFFICE O/P EST LOW 20 MIN: CPT | Mod: S$GLB,,, | Performed by: PODIATRIST

## 2025-01-13 PROCEDURE — 3008F BODY MASS INDEX DOCD: CPT | Mod: CPTII,S$GLB,, | Performed by: PODIATRIST

## 2025-01-13 NOTE — PROGRESS NOTES
"  [address] [facility] [address] [place] [address]  Phone: [phone]   [phone]    Patient's PCP:Carina He MD  Referring Provider: No ref. provider found    Subjective:      Chief Complaint:: Follow-up (Right foot pain capsulitis ) and Results (MRI results right foot)    NILE Garvey is a 49 y.o. female who presents today for follow-up on  complaint of right foot pain related to capsulitis and to discuss recent MRI results. Patient states no improvement since last visit presents weightbearing in cam walker boot cast. Notes increased swelling prolonged use while driving.     Vitals:    01/13/25 0814   Resp: 16   Weight: 65.6 kg (144 lb 10 oz)   Height: 5' 4" (1.626 m)   PainSc:   6      Shoe Size:     History reviewed. No pertinent surgical history.  History reviewed. No pertinent past medical history.  Family History   Problem Relation Name Age of Onset    Melanoma Neg Hx      Psoriasis Neg Hx      Lupus Neg Hx      Eczema Neg Hx          Social History:   Marital Status:   Alcohol History:  reports current alcohol use.  Tobacco History:  reports that she has never smoked. She has never used smokeless tobacco.  Drug History:  reports no history of drug use.    Review of patient's allergies indicates:   Allergen Reactions    Bactrim [sulfamethoxazole-trimethoprim] Rash       Current Outpatient Medications   Medication Sig Dispense Refill    apremilast (OTEZLA) 30 mg Tab Take 30 mg by mouth 2 (two) times daily.      cetirizine (ZYRTEC) 10 mg Cap Take 1 capsule by mouth once daily. (Patient not taking: Reported on 11/19/2024) 30 capsule 11    clotrimazole-betamethasone 1-0.05% (LOTRISONE) cream Apply topically 2 (two) times daily. 45 g 3    desonide (DESOWEN) 0.05 % cream       ergocalciferol (ERGOCALCIFEROL) 50,000 unit Cap Take 1 capsule (50,000 Units total) by mouth every 7 days. 12 capsule 1    ferrous sulfate 324 mg (65 mg iron) TbEC Take 1 tablet (324 mg total) by mouth once " daily. 90 tablet 3    HYDROcodone-acetaminophen (NORCO) 5-325 mg per tablet Take 1 tablet by mouth every 8 (eight) hours as needed for Pain. 21 tablet 0    ibuprofen (ADVIL,MOTRIN) 800 MG tablet Take 1 tablet (800 mg total) by mouth every 8 (eight) hours as needed for Pain. 45 tablet 0    medroxyPROGESTERone (PROVERA) 10 MG tablet Take 1 tablet by mouth 2 (two) times daily.      pimecrolimus (ELIDEL) 1 % cream 2 (two) times daily. apply to affected area      predniSONE (DELTASONE) 10 MG tablet 40 mg daily x 1 week 30 mg daily x 1 week 20 mg daily x 1 week 10 mg daily x 1 week 5  mg daily x 1 week 74 tablet 0     No current facility-administered medications for this visit.       Review of Systems   Constitutional:  Negative for chills, fatigue, fever and unexpected weight change.   HENT:  Negative for hearing loss and trouble swallowing.    Eyes:  Negative for photophobia and visual disturbance.   Respiratory:  Negative for cough, shortness of breath and wheezing.    Cardiovascular:  Negative for chest pain, palpitations and leg swelling.   Gastrointestinal:  Negative for abdominal pain and nausea.   Genitourinary:  Negative for dysuria and frequency.   Musculoskeletal:  Positive for arthralgias. Negative for back pain, gait problem, joint swelling, myalgias and neck pain.   Skin:  Negative for rash and wound.   Neurological:  Negative for tremors, seizures, weakness, numbness and headaches.   Hematological:  Does not bruise/bleed easily.   Psychiatric/Behavioral:  Negative for hallucinations.          Objective:        Physical Exam:   Foot Exam    General  General Appearance: appears stated age and healthy   Orientation: alert and oriented to person, place, and time   Affect: appropriate   Gait: antalgic       Right Foot/Ankle     Inspection and Palpation  Ecchymosis: none  Tenderness: lesser metatarsophalangeal joints   Swelling: lesser metatarsophalangeal joints   Arch: normal  Skin Exam: skin intact; no  drainage, no ulcer and no erythema   Neurovascular  Dorsalis pedis: 2+  Posterior tibial: 2+  Capillary Refill: 2+  Varicose veins: not present  Saphenous nerve sensation: normal  Tibial nerve sensation: normal  Superficial peroneal nerve sensation: normal  Deep peroneal nerve sensation: normal  Sural nerve sensation: normal    Edema  Type of edema: non-pitting    Muscle Strength  Ankle dorsiflexion: 5  Ankle plantar flexion: 5  Ankle inversion: 5  Ankle eversion: 5  Great toe extension: 5  Great toe flexion: 5    Range of Motion    Normal right ankle ROM    Tests  Anterior drawer: negative   Talar tilt: negative   PT Tinel's sign: negative    Paresthesia: negative        Physical Exam  Cardiovascular:      Pulses:           Dorsalis pedis pulses are 2+ on the right side.        Posterior tibial pulses are 2+ on the right side.   Feet:      Right foot:      Skin integrity: No ulcer or erythema.               Right Ankle/Foot Exam     Swelling   The patient is swollen on the lesser metatarsophalangeal joints.    Tenderness   The patient is tender to palpation of the lesser metatarsophalangeal joints.    Range of Motion   The patient has normal right ankle ROM.        Muscle Strength   Right Lower Extremity   Ankle Dorsiflexion:  5   Plantar flexion:  5/5    Vascular Exam     Right Pulses  Dorsalis Pedis:      2+  Posterior Tibial:      2+           Imaging:    Media Information    File Link             Assessment:       1. Capsulitis of metatarsophalangeal (MTP) joint of right foot    2. Right foot pain      Plan:   Capsulitis of metatarsophalangeal (MTP) joint of right foot  -     Ambulatory Referral/Consult to Physical Therapy/Occupational Therapy    Right foot pain  -     Ambulatory Referral/Consult to Physical Therapy/Occupational Therapy      Follow up if symptoms worsen or fail to improve.    Procedures        MRI findings and images were reviewed in detail with the patient.  I discussed findings of MRI.  I did  also review with her the lab work which she had completed after her last visit with me.  I explained that both her CRP and sed rate are elevated as well as a positive DAIJA.  I explained that it is possible that her joint pains are coming from either psoriatic arthritis or some other type of inflammatory condition.  Patient states that she did follow-up with her rheumatologist and is waiting results of a panel of lab test.  I explained to her that with traditional capsulitis the next step in treatment would be to try physical therapy.  At this time I am going to place a referral for her.  Also, as the boot has not been providing any real significant relief recommend that she discontinue this and instead wear supportive running type shoes.  Additionally, due to the pain and swelling she has with driving into work I am going to keep her on restrictions to work from home for the next month.    Counseling:     I provided patient education verbally regarding:   Patient diagnosis, treatment options, as well as alternatives, risks, and benefits.     This note was created using Dragon voice recognition software that occasionally misinterpreted phrases or words.

## 2025-01-13 NOTE — LETTER
January 13, 2025      [facility] - Podiatry  [address] [facility] [place] [address]  Phone: [phone]  Fax: [phone]       Patient: Preeti Garvey   YOB: 1975  Date of Visit: 01/13/2025    To Whom It May Concern:    Lamonte Garvey  was at [facility] on 01/13/2025. The patient is only able to work remotely for a minium of one month pending reevaluation. If you have any questions or concerns, or if I can be of further assistance, please do not hesitate to contact me.    Sincerely,    Electronically Signed by: Delmar Power DPM

## 2025-01-28 ENCOUNTER — TELEPHONE (OUTPATIENT)
Dept: RHEUMATOLOGY | Facility: CLINIC | Age: 50
End: 2025-01-28
Payer: COMMERCIAL

## 2025-01-28 NOTE — TELEPHONE ENCOUNTER
Scheduled pt for a new pt appt with Dr Brown  ----- Message from Nurse Wilcox sent at 1/14/2025  4:40 PM CST -----  Regarding: FW: Call    ----- Message -----  From: Kimmy Alexandre  Sent: 1/14/2025   8:05 AM CST  To: Desmond Montoya Staff  Subject: Call                                             Type:  Needs Medical Advice    Who Called: Pt    Would the patient rather a call back or a response via [facility]? Call    Best Call Back Number: [phone]    Additional Information: Pt sts she was told to contact nurse Perdomo to schedule an appt ( pt was recommended by brother in law Price ) . Thanks

## 2025-02-04 ENCOUNTER — TELEPHONE (OUTPATIENT)
Dept: FAMILY MEDICINE | Facility: CLINIC | Age: 50
End: 2025-02-04

## 2025-02-04 NOTE — TELEPHONE ENCOUNTER
----- Message from Kimmy sent at 2/4/2025  8:30 AM CST -----  Regarding: Call back  Type:  Needs Medical Advice    Who Called: Pt    Would the patient rather a call back or a response via [facility]? Call    Best Call Back Number: [phone]    Additional Information: Pt sts documents for blood work was sent over from Dr. Montelongo  from Rheumatologist ( 1/8/2025). Pt need an update. Thanks

## 2025-02-06 ENCOUNTER — PATIENT MESSAGE (OUTPATIENT)
Dept: FAMILY MEDICINE | Facility: CLINIC | Age: 50
End: 2025-02-06
Payer: COMMERCIAL

## 2025-02-06 RX ORDER — KETOROLAC TROMETHAMINE 10 MG/1
10 TABLET, FILM COATED ORAL EVERY 6 HOURS PRN
COMMUNITY
Start: 2024-10-24 | End: 2025-02-12

## 2025-02-06 RX ORDER — CLOBETASOL PROPIONATE 0.5 MG/G
CREAM TOPICAL 2 TIMES DAILY
COMMUNITY

## 2025-02-06 RX ORDER — FLUCONAZOLE 150 MG/1
150 TABLET ORAL
COMMUNITY
End: 2025-02-12

## 2025-02-06 NOTE — PROGRESS NOTES
"Subjective:      Patient ID: Preeti Garvey is a 50 y.o. female.    Chief Complaint: Pain and Swelling (Patient states " she had a reaction in her knees after she has taken otezeal cause swelling and pain and couldn't walk. Patient stated primary care gave her steroids to take to decrease inflammation and has helped but cannot be taken for a longer period of time" )    HPI    Rheumatologic History:     - Diagnosis/es:   - Psoriasis (patient states biopsy proven) diagnosed around    - Psoriatic arthritis diagnosed 25 on the basis of personal history of psoriasis, inflammatory arthritis, dactylitis, enthesitis. No uveitis, inflammatory back pain, GI symptoms.   - Family History: No autoimmune conditions  - Gyn History:  (1001); no VTE  - Social History: never smoker, rare alcohol intake   - Positive serologies: DAIJA 1:320 homogenous  - Negative serologies: dsDNA, SSA, SSB, Sm, RNP, RF  - Infectious screening labs: -  - Imaging:   - Xray right foot (24) No fracture or dislocation. No significant degenerative change. The soft tissues are unremarkable.   - Previous Treatments:   - prednisone 10mg:effective   - meloxicam: ineffective  - Current Treatments:    - Otezla 30mg BID ( -)    - Ibuprofen 200mg daily PRN (did try 800mg during flare)   - Family Planning: Still menstruating, but on Provera. No plans for pregnancy.   Interval History:   This is a 50 year old woman with allergic rhinitis, ROBERT, vitamin D deficiency, and psoriasis on Otezla. In 2024, she developed pain and swelling in her knees, antalgic gait requiring a walker, and pain and swelling in the 2nd digit of the right hand. Her PCP gave her a round of steroids that made the joint pain and swelling resolve completely. She still reports some pain and swelling in her right 2nd digit. She does report a history of plantar fasciitis and capsulitis in the right foot. The Otezla is working for her skin, but she still has residual " "pain in her knees and left right digit. She denies a history of patchy hair loss, rashes, oral/nasal ulcers, pleuritic chest pain, uveitis, diarrhea, bloody stool, and inflammatory back pain. She did see Dr Montelongo (Rheumatology) last month who recommended methotrexate, but he was out of network.    Objective:   /66 (BP Location: Right arm, Patient Position: Sitting)   Pulse 97   Temp 98.3 °F (36.8 °C) (Oral)   Ht 5' 4" (1.626 m)   Wt 63.6 kg (140 lb 3.4 oz)   SpO2 99%   BMI 24.07 kg/m²   Physical Exam   Constitutional: normal appearance.   HENT:   Head: Normocephalic and atraumatic.   Cardiovascular: Normal rate, regular rhythm and normal heart sounds.   Pulmonary/Chest: Effort normal and breath sounds normal.   Musculoskeletal:      Comments: Dactylitis, right 2nd digit  Mild swelling of both knees   Neurological: She is alert.   Skin:   Psoriasiform lesion, elbow     No data to display      Labs (9/27/24)   CBC WNL   CMP (12/6/24) CR, AST, ALT WNL   CRP 7.3 <- 29.3  ESR (11/19/24) 26     Assessment:     1. Psoriatic arthritis    2. Psoriasis    3. NSAID long-term use    4. Medication monitoring encounter    5. Positive DAIJA (antinuclear antibody)      This is a 50 year old woman with allergic rhinitis, ROBERT, vitamin D deficiency, and psoriasis on Otezla. In November 2024, she developed pain and swelling in her knees, antalgic gait requiring a walker, and pain and swelling in the 2nd digit of the right hand. Her PCP gave her a round of steroids that made the joint pain and swelling resolve completely. She still reports some pain and swelling in her right 2nd digit. She does report a history of plantar fasciitis and capsulitis in the right foot. The Otezla is working for her skin, but she still has residual pain and swelling in her knees and left right digit. Physical examination shows swelling in the knees and dactylitis of the right 2nd digit. I suspect psoriatic arthritis. We discussed potential " treatment options including increasing the dose of ibuprofen for a month and monitoring response, methotrexate low dose, and Humira. She prefers to try ibuprofen first then if she fails will consider MTX. She is hesitant about injectables.     Plan:     Problem List Items Addressed This Visit          Derm    Psoriasis    Relevant Orders    C-Reactive Protein    CBC Auto Differential    Creatinine, Serum    ALT (SGPT)    AST (SGOT)    Sedimentation rate    HIV 1/2 Ag/Ab (4th Gen)    Hepatitis B surface antigen    HBcAB    Hepatitis B surface antibody    Hepatitis C antibody    Quantiferon Gold TB    Treponema Pallidium Antibodies IgG, IgM       Orthopedic    Psoriatic arthritis - Primary    Relevant Orders    C-Reactive Protein    CBC Auto Differential    Creatinine, Serum    ALT (SGPT)    AST (SGOT)    Sedimentation rate    HIV 1/2 Ag/Ab (4th Gen)    Hepatitis B surface antigen    HBcAB    Hepatitis B surface antibody    Hepatitis C antibody    Quantiferon Gold TB    Treponema Pallidium Antibodies IgG, IgM    X-ray Arthritis Survey     Other Visit Diagnoses       NSAID long-term use        Relevant Medications    ibuprofen (ADVIL,MOTRIN) 800 MG tablet    Other Relevant Orders    C-Reactive Protein    CBC Auto Differential    Creatinine, Serum    ALT (SGPT)    AST (SGOT)    Sedimentation rate    HIV 1/2 Ag/Ab (4th Gen)    Hepatitis B surface antigen    HBcAB    Hepatitis B surface antibody    Hepatitis C antibody    Quantiferon Gold TB    Treponema Pallidium Antibodies IgG, IgM    Medication monitoring encounter        Relevant Orders    C-Reactive Protein    CBC Auto Differential    Creatinine, Serum    ALT (SGPT)    AST (SGOT)    Sedimentation rate    HIV 1/2 Ag/Ab (4th Gen)    Hepatitis B surface antigen    HBcAB    Hepatitis B surface antibody    Hepatitis C antibody    Quantiferon Gold TB    Treponema Pallidium Antibodies IgG, IgM    Positive DAIJA (antinuclear antibody)        Relevant Orders    C-Reactive  Protein    CBC Auto Differential    Creatinine, Serum    ALT (SGPT)    AST (SGOT)    Sedimentation rate    HIV 1/2 Ag/Ab (4th Gen)    Hepatitis B surface antigen    HBcAB    Hepatitis B surface antibody    Hepatitis C antibody    Quantiferon Gold TB    Treponema Pallidium Antibodies IgG, IgM          1.) Psoriasis  2.) Psoriatic arthritis   3.) Long term NSAID use  4.) Medication monitoring   - Ibuprofen 800mg daily trial for 1 month  - I discussed MTX potential side effects including infection, blood count abnormalities, liver/kidney abnormalities, pneumonitis, and GI upset. I discussed that methotrexate should be taken only once a week and should be taken with daily folic acid to prevent medication toxicity. I discussed that monthly monitoring will be necessary with each dose increase, and that monitoring will transition to every 3 months once on a stable dose. The ACR patient information sheet on methotrexate was provided for additional information.  - I discussed potential side effects of Humira including injection site reactions, malignancy, infection, blood count, liver and kidney function abnormalities. ACR patient information sheet on TNF inhibitors was provided.  - Patient information on PsA was provided  - CBC, CMP, ESR, CRP in 1 month   - Pre-DMARD labs before follow up  - Xray arthritis survey to establish baseline  - Immunizations: I recommended HD flu, PCV20, ad Shingrix in anticipation of potential DMARD therapy    Follow up in 1 months    60 minutes of total time spent on the encounter, which includes face to face time and non-face to face time preparing to see the patient (eg, review of tests), Obtaining and/or reviewing separately obtained history, Documenting clinical information in the electronic or other health record, Independently interpreting results (not separately reported) and communicating results to the patient/family/caregiver, or Care coordination (not separately reported).     This  note was prepared with [employer] Direct voice recognition transcription software. Garbled syntax, mangled pronouns, and other bizarre constructions may be attributed to that software system       Lindsay Logan M.D.  Rheumatology Dept  [place], [place]

## 2025-02-07 ENCOUNTER — OFFICE VISIT (OUTPATIENT)
Dept: RHEUMATOLOGY | Facility: CLINIC | Age: 50
End: 2025-02-07
Payer: COMMERCIAL

## 2025-02-07 ENCOUNTER — HOSPITAL ENCOUNTER (OUTPATIENT)
Dept: RADIOLOGY | Facility: HOSPITAL | Age: 50
Discharge: HOME | End: 2025-02-07
Attending: STUDENT IN AN ORGANIZED HEALTH CARE EDUCATION/TRAINING PROGRAM
Payer: COMMERCIAL

## 2025-02-07 VITALS
BODY MASS INDEX: 23.93 KG/M2 | OXYGEN SATURATION: 99 % | TEMPERATURE: 98 F | HEART RATE: 97 BPM | SYSTOLIC BLOOD PRESSURE: 124 MMHG | WEIGHT: 140.19 LBS | HEIGHT: 64 IN | DIASTOLIC BLOOD PRESSURE: 66 MMHG

## 2025-02-07 DIAGNOSIS — Z51.81 MEDICATION MONITORING ENCOUNTER: ICD-10-CM

## 2025-02-07 DIAGNOSIS — L40.50 PSORIATIC ARTHRITIS: ICD-10-CM

## 2025-02-07 DIAGNOSIS — Z79.1 NSAID LONG-TERM USE: ICD-10-CM

## 2025-02-07 DIAGNOSIS — L40.9 PSORIASIS: ICD-10-CM

## 2025-02-07 DIAGNOSIS — R76.8 POSITIVE ANA (ANTINUCLEAR ANTIBODY): ICD-10-CM

## 2025-02-07 DIAGNOSIS — L40.50 PSORIATIC ARTHRITIS: Primary | ICD-10-CM

## 2025-02-07 PROCEDURE — 99999 PR PBB SHADOW E&M-EST. PATIENT-LVL V: CPT | Mod: PBBFAC,,, | Performed by: STUDENT IN AN ORGANIZED HEALTH CARE EDUCATION/TRAINING PROGRAM

## 2025-02-07 PROCEDURE — 3008F BODY MASS INDEX DOCD: CPT | Mod: CPTII,S$GLB,, | Performed by: STUDENT IN AN ORGANIZED HEALTH CARE EDUCATION/TRAINING PROGRAM

## 2025-02-07 PROCEDURE — 77077 JOINT SURVEY SINGLE VIEW: CPT | Mod: 26,,, | Performed by: STUDENT IN AN ORGANIZED HEALTH CARE EDUCATION/TRAINING PROGRAM

## 2025-02-07 PROCEDURE — 77077 JOINT SURVEY SINGLE VIEW: CPT | Mod: TC,PO

## 2025-02-07 PROCEDURE — 1159F MED LIST DOCD IN RCRD: CPT | Mod: CPTII,S$GLB,, | Performed by: STUDENT IN AN ORGANIZED HEALTH CARE EDUCATION/TRAINING PROGRAM

## 2025-02-07 PROCEDURE — 1160F RVW MEDS BY RX/DR IN RCRD: CPT | Mod: CPTII,S$GLB,, | Performed by: STUDENT IN AN ORGANIZED HEALTH CARE EDUCATION/TRAINING PROGRAM

## 2025-02-07 PROCEDURE — 3074F SYST BP LT 130 MM HG: CPT | Mod: CPTII,S$GLB,, | Performed by: STUDENT IN AN ORGANIZED HEALTH CARE EDUCATION/TRAINING PROGRAM

## 2025-02-07 PROCEDURE — 3078F DIAST BP <80 MM HG: CPT | Mod: CPTII,S$GLB,, | Performed by: STUDENT IN AN ORGANIZED HEALTH CARE EDUCATION/TRAINING PROGRAM

## 2025-02-07 PROCEDURE — 99205 OFFICE O/P NEW HI 60 MIN: CPT | Mod: S$GLB,,, | Performed by: STUDENT IN AN ORGANIZED HEALTH CARE EDUCATION/TRAINING PROGRAM

## 2025-02-07 RX ORDER — IBUPROFEN 800 MG/1
800 TABLET ORAL DAILY
Qty: 30 TABLET | Refills: 1 | Status: SHIPPED | OUTPATIENT
Start: 2025-02-07

## 2025-02-07 ASSESSMENT — ROUTINE ASSESSMENT OF PATIENT INDEX DATA (RAPID3)
TOTAL RAPID3 SCORE: 3.22
MDHAQ FUNCTION SCORE: 0.5
PAIN SCORE: 4
FATIGUE SCORE: 0
PATIENT GLOBAL ASSESSMENT SCORE: 4
PSYCHOLOGICAL DISTRESS SCORE: 0

## 2025-02-12 ENCOUNTER — HOSPITAL ENCOUNTER (OUTPATIENT)
Dept: RADIOLOGY | Facility: CLINIC | Age: 50
Discharge: HOME | End: 2025-02-12
Attending: PODIATRIST
Payer: COMMERCIAL

## 2025-02-12 ENCOUNTER — OFFICE VISIT (OUTPATIENT)
Dept: PODIATRY | Facility: CLINIC | Age: 50
End: 2025-02-12
Payer: COMMERCIAL

## 2025-02-12 VITALS — BODY MASS INDEX: 23.92 KG/M2 | WEIGHT: 139.31 LBS

## 2025-02-12 DIAGNOSIS — M79.671 RIGHT FOOT PAIN: ICD-10-CM

## 2025-02-12 DIAGNOSIS — M72.2 PLANTAR FASCIITIS: Primary | ICD-10-CM

## 2025-02-12 PROCEDURE — 99999 PR PBB SHADOW E&M-EST. PATIENT-LVL III: CPT | Mod: PBBFAC,,, | Performed by: PODIATRIST

## 2025-02-12 PROCEDURE — 1160F RVW MEDS BY RX/DR IN RCRD: CPT | Mod: CPTII,S$GLB,, | Performed by: PODIATRIST

## 2025-02-12 PROCEDURE — 1159F MED LIST DOCD IN RCRD: CPT | Mod: CPTII,S$GLB,, | Performed by: PODIATRIST

## 2025-02-12 PROCEDURE — 3008F BODY MASS INDEX DOCD: CPT | Mod: CPTII,S$GLB,, | Performed by: PODIATRIST

## 2025-02-12 PROCEDURE — 99214 OFFICE O/P EST MOD 30 MIN: CPT | Mod: S$GLB,,, | Performed by: PODIATRIST

## 2025-02-12 PROCEDURE — 73630 X-RAY EXAM OF FOOT: CPT | Mod: RT,S$GLB,, | Performed by: RADIOLOGY

## 2025-02-12 RX ORDER — METHYLPREDNISOLONE 4 MG/1
TABLET ORAL
Qty: 21 EACH | Refills: 1 | Status: SHIPPED | OUTPATIENT
Start: 2025-02-12 | End: 2025-03-05

## 2025-02-12 NOTE — PROGRESS NOTES
[address] [facility] [address] [place] [address]  Phone: [phone]   [phone]    Patient's PCP:Carina He MD  Referring Provider: No ref. provider found    Subjective:      Chief Complaint:: Foot Injury (RT)    Foot Injury   Pertinent negatives include no numbness.     Preeti Garvey is a 50 y.o. female who presents today with a complaint of RT foot injury. The current episode started 3 days ago.  The symptoms include swelling, throbbing, sharp pain. Probable cause of complaint walking on uneven surface.  The symptoms are aggravated by unknown. The problem has stayed the same. Treatment to date have included ice boot, OTC pain medication.  Patient states that her plantar fasciitis pain had nearly fully resolved however returned with the acute injury.      Vitals:    02/12/25 0828   Weight: 63.2 kg (139 lb 5.3 oz)   PainSc:   6      Shoe Size: 7.5    History reviewed. No pertinent surgical history.  History reviewed. No pertinent past medical history.  Family History   Problem Relation Name Age of Onset    Melanoma Neg Hx      Psoriasis Neg Hx      Lupus Neg Hx      Eczema Neg Hx          Social History:   Marital Status:   Alcohol History:  reports current alcohol use.  Tobacco History:  reports that she has never smoked. She has never used smokeless tobacco.  Drug History:  reports no history of drug use.    Review of patient's allergies indicates:   Allergen Reactions    Bactrim [sulfamethoxazole-trimethoprim] Rash       Current Outpatient Medications   Medication Sig Dispense Refill    apremilast (OTEZLA) 30 mg Tab Take 30 mg by mouth 2 (two) times daily.      cetirizine (ZYRTEC) 10 mg Cap Take 1 capsule by mouth once daily. (Patient not taking: Reported on 11/19/2024) 30 capsule 11    clobetasoL (TEMOVATE) 0.05 % cream 2 (two) times daily. (Patient not taking: Reported on 2/7/2025)      clotrimazole-betamethasone 1-0.05% (LOTRISONE) cream Apply topically 2 (two) times daily.  (Patient not taking: Reported on 2/7/2025) 45 g 3    desonide (DESOWEN) 0.05 % cream  (Patient not taking: Reported on 2/7/2025)      ergocalciferol (ERGOCALCIFEROL) 50,000 unit Cap Take 1 capsule (50,000 Units total) by mouth every 7 days. 12 capsule 1    ferrous sulfate 324 mg (65 mg iron) TbEC Take 1 tablet (324 mg total) by mouth once daily. 90 tablet 3    HYDROcodone-acetaminophen (NORCO) 5-325 mg per tablet Take 1 tablet by mouth every 8 (eight) hours as needed for Pain. (Patient not taking: Reported on 2/7/2025) 21 tablet 0    ibuprofen (ADVIL,MOTRIN) 800 MG tablet Take 1 tablet (800 mg total) by mouth once daily. 30 tablet 1    medroxyPROGESTERone (PROVERA) 10 MG tablet Take 1 tablet by mouth 2 (two) times daily.      methylPREDNISolone (MEDROL DOSEPACK) 4 mg tablet use as directed 21 each 1    pimecrolimus (ELIDEL) 1 % cream 2 (two) times daily. apply to affected area (Patient not taking: Reported on 2/7/2025)      predniSONE (DELTASONE) 10 MG tablet 40 mg daily x 1 week 30 mg daily x 1 week 20 mg daily x 1 week 10 mg daily x 1 week 5  mg daily x 1 week (Patient not taking: Reported on 2/7/2025) 74 tablet 0     No current facility-administered medications for this visit.       Review of Systems   Constitutional:  Negative for chills, fatigue, fever and unexpected weight change.   HENT:  Negative for hearing loss and trouble swallowing.    Eyes:  Negative for photophobia and visual disturbance.   Respiratory:  Negative for cough, shortness of breath and wheezing.    Cardiovascular:  Negative for chest pain, palpitations and leg swelling.   Gastrointestinal:  Negative for abdominal pain and nausea.   Genitourinary:  Negative for dysuria and frequency.   Musculoskeletal:  Negative for arthralgias, back pain, gait problem, joint swelling, myalgias and neck pain.   Skin:  Negative for rash and wound.   Neurological:  Negative for tremors, seizures, weakness, numbness and headaches.   Hematological:  Does not  bruise/bleed easily.   Psychiatric/Behavioral:  Negative for hallucinations.          Objective:        Physical Exam:   Foot Exam    General  General Appearance: appears stated age and healthy   Orientation: alert and oriented to person, place, and time   Affect: appropriate   Gait: antalgic       Right Foot/Ankle     Inspection and Palpation  Ecchymosis: none  Tenderness: plantar fascia   Swelling: none   Arch: normal  Skin Exam: skin intact; no drainage, no ulcer and no erythema   Neurovascular  Dorsalis pedis: 2+  Posterior tibial: 2+  Capillary Refill: 2+  Varicose veins: not present  Saphenous nerve sensation: normal  Tibial nerve sensation: normal  Superficial peroneal nerve sensation: normal  Deep peroneal nerve sensation: normal  Sural nerve sensation: normal    Edema  Type of edema: non-pitting    Muscle Strength  Ankle dorsiflexion: 5  Ankle plantar flexion: 5  Ankle inversion: 5  Ankle eversion: 5  Great toe extension: 5  Great toe flexion: 5    Range of Motion    Normal right ankle ROM    Tests  Anterior drawer: negative   Talar tilt: negative   PT Tinel's sign: negative    Paresthesia: negative  Comments  Pain on palpation of the infeior medial heel and central plantar heel. No pain present  with side to side compression of the calcaneus. Negative tinnel's sign  at the tarsal tunnel. Negative Moore's nerve pain. Negative Calcaneal nerve pain. No soft tissue masses. Pain present with dorsiflexion of the ankle. No edema, erythema, or ecchymosis noted.           Physical Exam  Cardiovascular:      Pulses:           Dorsalis pedis pulses are 2+ on the right side.        Posterior tibial pulses are 2+ on the right side.   Feet:      Right foot:      Skin integrity: No ulcer or erythema.               Right Ankle/Foot Exam     Range of Motion   The patient has normal right ankle ROM.    Comments:  Pain on palpation of the infeior medial heel and central plantar heel. No pain present  with side to side  compression of the calcaneus. Negative tinnel's sign  at the tarsal tunnel. Negative Moore's nerve pain. Negative Calcaneal nerve pain. No soft tissue masses. Pain present with dorsiflexion of the ankle. No edema, erythema, or ecchymosis noted.           Muscle Strength   Right Lower Extremity   Ankle Dorsiflexion:  5   Plantar flexion:  5/5    Vascular Exam     Right Pulses  Dorsalis Pedis:      2+  Posterior Tibial:      2+           Imaging: X-Ray Foot Complete Right  Narrative: EXAMINATION:  XR FOOT COMPLETE 3 VIEW RIGHT    CLINICAL HISTORY:  . Pain in right foot    TECHNIQUE:  AP, lateral, and oblique views of the right foot were performed.    COMPARISON:  Right foot x-rays of November 11, 2024    FINDINGS:  A fracture of the bones of the right foot is not seen.  Soft tissue swelling or foreign bodies are not noted.  Juxta-articular bone erosion or Osteoporosis is not seen.  Impression: Negative right foot radiographs.    Electronically signed by: Ant Greenfield MD  Date:    02/12/2025  Time:    09:13               Assessment:       1. Plantar fasciitis    2. Right foot pain      Plan:   Plantar fasciitis  -     Ambulatory Referral/Consult to Physical Therapy/Occupational Therapy  -     methylPREDNISolone (MEDROL DOSEPACK) 4 mg tablet; use as directed  Dispense: 21 each; Refill: 1  -     AIR CAST WALKER BOOT FOR HOME USE    Right foot pain  -     X-Ray Foot Complete Right  -     Ambulatory Referral/Consult to Physical Therapy/Occupational Therapy  -     methylPREDNISolone (MEDROL DOSEPACK) 4 mg tablet; use as directed  Dispense: 21 each; Refill: 1  -     AIR CAST WALKER BOOT FOR HOME USE      Follow up if symptoms worsen or fail to improve.    Procedures        Discussed different treatment options for heel pain. I gave written and verbal instructions on heel cord stretching and this was demonstrated for the patient. Patient expressed understanding. Discussed wearing appropriate shoe gear and avoiding flats,  slippers, sandals, barefoot, and sockfeet. Recommended arch supports. My recommendation for OTC supports is Spenco polysorb replacement insoles or patient may elect more aggressive treatment with prescription arch supports. We also discussed cortisone injections and NSAID therapy.     CAM walker boot with weight bearing  Ice to painful area, 15 minutes at a time  Ace-wrap to help with swelling  No barefoot   Keep affected extremity elevated while seated    I am going to extend the patient's work restrictions to work from home for 1 additional month.    Counseling:     I provided patient education verbally regarding:   Patient diagnosis, treatment options, as well as alternatives, risks, and benefits.     This note was created using Dragon voice recognition software that occasionally misinterpreted phrases or words.

## 2025-02-12 NOTE — LETTER
February 12, 2025      [facility] - Podiatry  [address] [place] [address]  Phone: [phone]  Fax: [phone]       Patient: Preeti Garvey   YOB: 1975  Date of Visit: 02/12/2025    To Whom It May Concern:    Lamonte Garvey  was at [facility] on 02/12/2025. The patient may return to work on 3/12/2025 with no restrictions. If you have any questions or concerns, or if I can be of further assistance, please do not hesitate to contact me.    Sincerely,    Electronically Signed by: Delmar Power DPM

## 2025-02-13 ENCOUNTER — PATIENT MESSAGE (OUTPATIENT)
Dept: PODIATRY | Facility: CLINIC | Age: 50
End: 2025-02-13
Payer: COMMERCIAL

## 2025-03-11 ENCOUNTER — OFFICE VISIT (OUTPATIENT)
Dept: FAMILY MEDICINE | Facility: CLINIC | Age: 50
End: 2025-03-11
Payer: COMMERCIAL

## 2025-03-11 VITALS
OXYGEN SATURATION: 99 % | SYSTOLIC BLOOD PRESSURE: 116 MMHG | HEIGHT: 64 IN | RESPIRATION RATE: 18 BRPM | BODY MASS INDEX: 24.24 KG/M2 | HEART RATE: 109 BPM | DIASTOLIC BLOOD PRESSURE: 80 MMHG | WEIGHT: 142 LBS

## 2025-03-11 DIAGNOSIS — M54.32 LEFT SIDED SCIATICA: Primary | ICD-10-CM

## 2025-03-11 PROCEDURE — 99213 OFFICE O/P EST LOW 20 MIN: CPT | Mod: S$GLB,,, | Performed by: FAMILY MEDICINE

## 2025-03-11 PROCEDURE — 1160F RVW MEDS BY RX/DR IN RCRD: CPT | Mod: CPTII,S$GLB,, | Performed by: FAMILY MEDICINE

## 2025-03-11 PROCEDURE — 1159F MED LIST DOCD IN RCRD: CPT | Mod: CPTII,S$GLB,, | Performed by: FAMILY MEDICINE

## 2025-03-11 PROCEDURE — 3079F DIAST BP 80-89 MM HG: CPT | Mod: CPTII,S$GLB,, | Performed by: FAMILY MEDICINE

## 2025-03-11 PROCEDURE — 99999 PR PBB SHADOW E&M-EST. PATIENT-LVL IV: CPT | Mod: PBBFAC,,, | Performed by: FAMILY MEDICINE

## 2025-03-11 PROCEDURE — 3074F SYST BP LT 130 MM HG: CPT | Mod: CPTII,S$GLB,, | Performed by: FAMILY MEDICINE

## 2025-03-11 PROCEDURE — 3008F BODY MASS INDEX DOCD: CPT | Mod: CPTII,S$GLB,, | Performed by: FAMILY MEDICINE

## 2025-03-11 RX ORDER — METHYLPREDNISOLONE 4 MG/1
TABLET ORAL
Qty: 1 EACH | Refills: 0 | Status: SHIPPED | OUTPATIENT
Start: 2025-03-11

## 2025-03-11 RX ORDER — KETOROLAC TROMETHAMINE 10 MG/1
10 TABLET, FILM COATED ORAL EVERY 6 HOURS PRN
COMMUNITY
Start: 2025-03-10

## 2025-03-11 RX ORDER — TRAMADOL HYDROCHLORIDE 50 MG/1
50 TABLET ORAL EVERY 6 HOURS PRN
Qty: 20 EACH | Refills: 0 | Status: SHIPPED | OUTPATIENT
Start: 2025-03-11

## 2025-03-11 NOTE — PROGRESS NOTES
Subjective:       Patient ID: Preeti Garvey is a 50 y.o. female.    Chief Complaint: No chief complaint on file.    Patient here for UC visit.  2 day hx of spontaneous onset of jessie in left low back/hip and down leg to foot.  No numbness or weakness.  Hurts to get up or down or twist or lift leg.    Review of Systems   Constitutional:  Negative for fever.   Respiratory:  Negative for shortness of breath.    Cardiovascular:  Negative for chest pain.   Gastrointestinal:  Negative for abdominal pain and nausea.   Musculoskeletal:  Positive for back pain.   Skin:  Negative for rash.   All other systems reviewed and are negative.      Objective:      Physical Exam  Vitals reviewed.   Constitutional:       General: She is not in acute distress.     Appearance: She is well-developed.   Cardiovascular:      Rate and Rhythm: Normal rate and regular rhythm.      Heart sounds: No murmur heard.  Pulmonary:      Effort: Pulmonary effort is normal.      Breath sounds: Normal breath sounds.   Musculoskeletal:      Lumbar back: No tenderness. Positive left straight leg raise test.   Neurological:      Motor: Motor function is intact.      Deep Tendon Reflexes:      Reflex Scores:       Patellar reflexes are 2+ on the right side and 2+ on the left side.        Assessment:       1. Left sided sciatica        Plan:       Left sided sciatica  -     methylPREDNISolone (MEDROL DOSEPACK) 4 mg tablet; use as directed  Dispense: 1 each; Refill: 0  -     traMADoL (ULTRAM) 50 mg tablet; Take 1 tablet (50 mg total) by mouth every 6 (six) hours as needed for Pain.  Dispense: 20 each; Refill: 0      Patient Instructions   Heat on medium ; 3-4 times a day.    Contact your PCP if any worsening or for any new concerns as we discussed.

## 2025-03-11 NOTE — PATIENT INSTRUCTIONS
Heat on medium ; 3-4 times a day.    Contact your PCP if any worsening or for any new concerns as we discussed.

## 2025-03-19 ENCOUNTER — PATIENT OUTREACH (OUTPATIENT)
Dept: ADMINISTRATIVE | Facility: HOSPITAL | Age: 50
End: 2025-03-19
Payer: COMMERCIAL

## 2025-03-19 NOTE — LETTER
March 19, 2025    Preeti Garvey  [address] [address]             [facility]  [address]  [place] [address]  Phone: [phone] Dear Neri Álvarez Amy L., MD  placed the Colonoscopy order.   Please contact our Gastroenterology Group at [phone] or [phone]   when you are ready to schedule.        Let us know if you need anything further!  Have a good day!      Your [facility]  Nidhi Rojas, Care Coordinator  Phone: [phone]  FAX: [phone]

## 2025-03-19 NOTE — PROGRESS NOTES
Population Health Chart Review & Patient Outreach Details      Additional [facility] Health Notes:      PAYOR NON COMPLIANT REPORT      COLORECTAL CANCER SCREENING                  Updates Requested / Reviewed:             Health Maintenance Topics Overdue:      [facility] Score: 1     Colon Cancer Screening                       Health Maintenance Topic(s) Outreach Outcomes & Actions Taken:    Colorectal Cancer Screening - Outreach Outcomes & Actions Taken  : Colonoscopy Case Request / Referral / Home Test Order Placed

## 2025-03-21 ENCOUNTER — LAB VISIT (OUTPATIENT)
Dept: LAB | Facility: HOSPITAL | Age: 50
End: 2025-03-21
Attending: STUDENT IN AN ORGANIZED HEALTH CARE EDUCATION/TRAINING PROGRAM
Payer: COMMERCIAL

## 2025-03-21 DIAGNOSIS — L40.50 PSORIATIC ARTHRITIS: ICD-10-CM

## 2025-03-21 DIAGNOSIS — R76.8 POSITIVE ANA (ANTINUCLEAR ANTIBODY): ICD-10-CM

## 2025-03-21 DIAGNOSIS — Z51.81 MEDICATION MONITORING ENCOUNTER: ICD-10-CM

## 2025-03-21 DIAGNOSIS — Z79.1 NSAID LONG-TERM USE: ICD-10-CM

## 2025-03-21 DIAGNOSIS — L40.9 PSORIASIS: ICD-10-CM

## 2025-03-21 LAB
AST SERPL-CCNC: 14 U/L (ref 10–40)
ERYTHROCYTE [SEDIMENTATION RATE] IN BLOOD BY WESTERGREN METHOD: 12 MM/HR (ref 0–20)
HBV CORE AB SERPL QL IA: NORMAL
HBV SURFACE AB SER-ACNC: 778.61 MIU/ML
HBV SURFACE AB SER-ACNC: REACTIVE M[IU]/ML
HBV SURFACE AG SERPL QL IA: NORMAL
HCV AB SERPL QL IA: NORMAL
HIV 1+2 AB+HIV1 P24 AG SERPL QL IA: NORMAL
TREPONEMA PALLIDUM IGG+IGM AB [PRESENCE] IN SERUM OR PLASMA BY IMMUNOASSAY: NONREACTIVE

## 2025-03-21 PROCEDURE — 36415 COLL VENOUS BLD VENIPUNCTURE: CPT | Mod: PO | Performed by: STUDENT IN AN ORGANIZED HEALTH CARE EDUCATION/TRAINING PROGRAM

## 2025-03-21 PROCEDURE — 87389 HIV-1 AG W/HIV-1&-2 AB AG IA: CPT | Performed by: STUDENT IN AN ORGANIZED HEALTH CARE EDUCATION/TRAINING PROGRAM

## 2025-03-21 PROCEDURE — 86706 HEP B SURFACE ANTIBODY: CPT | Performed by: STUDENT IN AN ORGANIZED HEALTH CARE EDUCATION/TRAINING PROGRAM

## 2025-03-21 PROCEDURE — 86803 HEPATITIS C AB TEST: CPT | Performed by: STUDENT IN AN ORGANIZED HEALTH CARE EDUCATION/TRAINING PROGRAM

## 2025-03-21 PROCEDURE — 86593 SYPHILIS TEST NON-TREP QUANT: CPT | Performed by: STUDENT IN AN ORGANIZED HEALTH CARE EDUCATION/TRAINING PROGRAM

## 2025-03-21 PROCEDURE — 84450 TRANSFERASE (AST) (SGOT): CPT | Performed by: STUDENT IN AN ORGANIZED HEALTH CARE EDUCATION/TRAINING PROGRAM

## 2025-03-21 PROCEDURE — 85651 RBC SED RATE NONAUTOMATED: CPT | Mod: PO | Performed by: STUDENT IN AN ORGANIZED HEALTH CARE EDUCATION/TRAINING PROGRAM

## 2025-03-21 PROCEDURE — 87340 HEPATITIS B SURFACE AG IA: CPT | Performed by: STUDENT IN AN ORGANIZED HEALTH CARE EDUCATION/TRAINING PROGRAM

## 2025-03-21 PROCEDURE — 86704 HEP B CORE ANTIBODY TOTAL: CPT | Performed by: STUDENT IN AN ORGANIZED HEALTH CARE EDUCATION/TRAINING PROGRAM

## 2025-03-24 ENCOUNTER — RESULTS FOLLOW-UP (OUTPATIENT)
Dept: RHEUMATOLOGY | Facility: CLINIC | Age: 50
End: 2025-03-24

## 2025-03-26 ENCOUNTER — OFFICE VISIT (OUTPATIENT)
Dept: PODIATRY | Facility: CLINIC | Age: 50
End: 2025-03-26
Payer: COMMERCIAL

## 2025-03-26 VITALS
DIASTOLIC BLOOD PRESSURE: 68 MMHG | WEIGHT: 140.44 LBS | HEIGHT: 64 IN | BODY MASS INDEX: 23.98 KG/M2 | HEART RATE: 90 BPM | SYSTOLIC BLOOD PRESSURE: 113 MMHG

## 2025-03-26 DIAGNOSIS — M77.51 CAPSULITIS OF METATARSOPHALANGEAL (MTP) JOINT OF RIGHT FOOT: ICD-10-CM

## 2025-03-26 DIAGNOSIS — M72.2 PLANTAR FASCIITIS: Primary | ICD-10-CM

## 2025-03-26 DIAGNOSIS — M79.671 RIGHT FOOT PAIN: ICD-10-CM

## 2025-03-26 PROCEDURE — 99999 PR PBB SHADOW E&M-EST. PATIENT-LVL III: CPT | Mod: PBBFAC,,, | Performed by: PODIATRIST

## 2025-03-26 RX ORDER — TRIAMCINOLONE ACETONIDE 40 MG/ML
40 INJECTION, SUSPENSION INTRA-ARTICULAR; INTRAMUSCULAR
Status: SHIPPED | OUTPATIENT
Start: 2025-03-26

## 2025-03-27 NOTE — PROGRESS NOTES
Subjective:      Patient ID: Preeti Garvey is a 50 y.o. female.    Chief Complaint: Disease Management    HPI    Rheumatologic History:      - Diagnosis/es:              - Psoriasis (patient states biopsy proven) diagnosed around               - Psoriatic arthritis diagnosed 25 on the basis of personal history of psoriasis, inflammatory arthritis, dactylitis, enthesitis. No uveitis, inflammatory back pain, GI symptoms.   - Family History: No autoimmune conditions  - Gyn History:  (1001); no VTE  - Social History: never smoker, rare alcohol intake   - Positive serologies: DAIJA 1:320 homogenous  - Negative serologies: dsDNA, SSA, SSB, Sm, RNP, RF  - Infectious screening labs: Negative HIV, hepatitis B and C (3/21/25), quantiferon not drawn  - Imaging:              - Xray right foot (24) No fracture or dislocation. No significant degenerative change. The soft tissues are unremarkable.   - Previous Treatments:              - prednisone 10mg:effective              - meloxicam: ineffective  - Current Treatments:               - Otezla 30mg BID ( -)               - Ibuprofen 800mg daily  - Family Planning: Still menstruating, but on Provera. No plans for pregnancy.   Initial History:   In 2024, she developed pain and swelling in her knees, antalgic gait requiring a walker, and pain and swelling in the 2nd digit of the right hand. Her PCP gave her a round of steroids that made the joint pain and swelling resolve completely. She still reports some pain and swelling in her right 2nd digit. She does report a history of plantar fasciitis and capsulitis in the right foot. The Otezla is working for her skin, but she still has residual pain and swelling in her knees and left right digit. Physical examination shows swelling in the knees and dactylitis of the right 2nd digit. I suspect psoriatic arthritis. We discussed potential treatment options including increasing the dose of ibuprofen for a  "month and monitoring response, methotrexate low dose, and Humira. She prefers to try ibuprofen first then if she fails will consider MTX. She is hesitant about injectables.     Interval History:   She is doing well and the pain and swelling in her hands has resolved. She has developed right plantar fasciitis after exercising barefoot, has seen Podiatry, and is now in a boot.     Objective:   /76 (BP Location: Right arm, Patient Position: Sitting)   Pulse 85   Ht 5' 4" (1.626 m)   Wt 64.5 kg (142 lb 3.2 oz)   LMP 03/09/2025 (Exact Date)   BMI 24.41 kg/m²   Physical Exam   Constitutional: normal appearance.   HENT:   Head: Normocephalic and atraumatic.   Cardiovascular: Normal rate, regular rhythm and normal heart sounds.   Neurological: She is alert.       No data to display     Labs (3/21/25)   ESR WNL  AST WNL    Assessment:     1. Psoriatic arthritis    2. Psoriasis    3. NSAID long-term use    4. Medication monitoring encounter      This is a 50 year old woman with allergic rhinitis, ROBERT, vitamin D deficiency,  psoriasis  and psoriatic arthritis on Otezla and ibuprofen 800mg daily. She is doing well and the pain and swelling in her hands has resolved. She has developed right plantar fasciitis after exercising barefoot, has seen Podiatry, and is now in a boot. Continue ibuprofen 800mg daily for 1 month then try tapering to 400mg for 1 month, then 200mg until follow up. At previous visit, we discussed potential treatment options including methotrexate low dose, and Humira. She prefers to try ibuprofen first then if she fails will consider MTX. She is hesitant about injectables.     Plan:     Problem List Items Addressed This Visit          Derm    Psoriasis    Relevant Orders    CBC Auto Differential    Sedimentation rate    C-Reactive Protein    Creatinine, Serum    AST (SGOT)    ALT (SGPT)       Orthopedic    Psoriatic arthritis - Primary    Relevant Orders    CBC Auto Differential    Sedimentation " rate    C-Reactive Protein    Creatinine, Serum    AST (SGOT)    ALT (SGPT)     Other Visit Diagnoses         NSAID long-term use        Relevant Orders    CBC Auto Differential    Sedimentation rate    C-Reactive Protein    Creatinine, Serum    AST (SGOT)    ALT (SGPT)      Medication monitoring encounter        Relevant Orders    CBC Auto Differential    Sedimentation rate    C-Reactive Protein    Creatinine, Serum    AST (SGOT)    ALT (SGPT)          1.) Psoriasis  2.) Psoriatic arthritis   3.) Long term NSAID use  4.) Medication monitoring   - Ibuprofen 800mg daily for 1 month, then 400mg daily for 1 month, then 200mg daily for 1 month  - At previous visit, I discussed MTX potential side effects including infection, blood count abnormalities, liver/kidney abnormalities, pneumonitis, and GI upset. I discussed that methotrexate should be taken only once a week and should be taken with daily folic acid to prevent medication toxicity. I discussed that monthly monitoring will be necessary with each dose increase, and that monitoring will transition to every 3 months once on a stable dose. The ACR patient information sheet on methotrexate was provided for additional information.  - At previous visit, I discussed potential side effects of Humira including injection site reactions, malignancy, infection, blood count, liver and kidney function abnormalities. ACR patient information sheet on TNF inhibitors was provided.  - Patient information on PsA was provided  - CBC, CMP, ESR, CRP before follow up  - Pre-DMARD labs: quantiferon  - Immunizations: I recommended HD flu, PCV20, ad Shingrix in anticipation of potential DMARD therapy    Follow up in 3 months    30 minutes of total time spent on the encounter, which includes face to face time and non-face to face time preparing to see the patient (eg, review of tests), Obtaining and/or reviewing separately obtained history, Documenting clinical information in the electronic  or other health record, Independently interpreting results (not separately reported) and communicating results to the patient/family/caregiver, or Care coordination (not separately reported).     This note was prepared with [employer] Direct voice recognition transcription software. Garbled syntax, mangled pronouns, and other bizarre constructions may be attributed to that software system       Lindsay Logan M.D.  Rheumatology Dept  [place], [place]

## 2025-03-28 ENCOUNTER — OFFICE VISIT (OUTPATIENT)
Dept: RHEUMATOLOGY | Facility: CLINIC | Age: 50
End: 2025-03-28
Payer: COMMERCIAL

## 2025-03-28 VITALS
SYSTOLIC BLOOD PRESSURE: 138 MMHG | WEIGHT: 142.19 LBS | BODY MASS INDEX: 24.28 KG/M2 | DIASTOLIC BLOOD PRESSURE: 76 MMHG | HEIGHT: 64 IN | HEART RATE: 85 BPM

## 2025-03-28 DIAGNOSIS — Z51.81 MEDICATION MONITORING ENCOUNTER: ICD-10-CM

## 2025-03-28 DIAGNOSIS — L40.50 PSORIATIC ARTHRITIS: Primary | ICD-10-CM

## 2025-03-28 DIAGNOSIS — Z79.1 NSAID LONG-TERM USE: ICD-10-CM

## 2025-03-28 DIAGNOSIS — L40.9 PSORIASIS: ICD-10-CM

## 2025-03-28 PROCEDURE — 99999 PR PBB SHADOW E&M-EST. PATIENT-LVL III: CPT | Mod: PBBFAC,,, | Performed by: STUDENT IN AN ORGANIZED HEALTH CARE EDUCATION/TRAINING PROGRAM

## 2025-03-28 ASSESSMENT — ROUTINE ASSESSMENT OF PATIENT INDEX DATA (RAPID3)
PATIENT GLOBAL ASSESSMENT SCORE: 1.5
TOTAL RAPID3 SCORE: 1.67
PAIN SCORE: 2.5
MDHAQ FUNCTION SCORE: 0.3
FATIGUE SCORE: 0
PSYCHOLOGICAL DISTRESS SCORE: 0

## 2025-04-01 ENCOUNTER — PATIENT MESSAGE (OUTPATIENT)
Dept: RHEUMATOLOGY | Facility: CLINIC | Age: 50
End: 2025-04-01
Payer: COMMERCIAL

## 2025-04-02 NOTE — PROGRESS NOTES
"  [address] [facility] [address]  [place] [address]  Phone: [phone]   [phone]    Patient's PCP:Carina He MD  Referring Provider: No ref. provider found    Subjective:      Chief Complaint:: Foot Pain (Right foot soreness and throbbing, stinging and burning on and off) and Plantar Fasciitis (Bl foot)    Here for 2nd opinion.  Foot Injury   Pertinent negatives include no numbness.   Foot Pain  Pertinent negatives include no abdominal pain, arthralgias, chest pain, chills, coughing, fatigue, fever, headaches, joint swelling, myalgias, nausea, neck pain, numbness, rash or weakness.     Preeti Garvey is a 50 y.o. female who presents today with a complaint of RT foot injury. The current episode started 3 days ago.  The symptoms include swelling, throbbing, sharp pain. Probable cause of complaint walking on uneven surface.  The symptoms are aggravated by unknown. The problem has stayed the same. Treatment to date have included ice boot, OTC pain medication.  Patient states that her plantar fasciitis pain had nearly fully resolved however returned with the acute injury.      Vitals:    03/26/25 0824   BP: 113/68   Pulse: 90   Weight: 63.7 kg (140 lb 6.9 oz)   Height: 5' 4" (1.626 m)   PainSc:   5   PainLoc: Foot      Shoe Size: 7.5    History reviewed. No pertinent surgical history.  History reviewed. No pertinent past medical history.  Family History   Problem Relation Name Age of Onset    Melanoma Neg Hx      Psoriasis Neg Hx      Lupus Neg Hx      Eczema Neg Hx          Social History:   Marital Status:   Alcohol History:  reports current alcohol use.  Tobacco History:  reports that she has never smoked. She has never used smokeless tobacco.  Drug History:  reports no history of drug use.    Review of patient's allergies indicates:   Allergen Reactions    Bactrim [sulfamethoxazole-trimethoprim] Rash       Current Outpatient Medications   Medication Sig Dispense Refill    apremilast " (OTEZLA) 30 mg Tab Take 30 mg by mouth 2 (two) times daily.      desonide (DESOWEN) 0.05 % cream       ergocalciferol (ERGOCALCIFEROL) 50,000 unit Cap Take 1 capsule (50,000 Units total) by mouth every 7 days. 12 capsule 1    ferrous sulfate 324 mg (65 mg iron) TbEC Take 1 tablet (324 mg total) by mouth once daily. 90 tablet 3    ibuprofen (ADVIL,MOTRIN) 800 MG tablet Take 1 tablet (800 mg total) by mouth once daily. 30 tablet 1    ketorolac (TORADOL) 10 mg tablet Take 10 mg by mouth every 6 (six) hours as needed.      medroxyPROGESTERone (PROVERA) 10 MG tablet Take 1 tablet by mouth 2 (two) times daily.      methylPREDNISolone (MEDROL DOSEPACK) 4 mg tablet use as directed 1 each 0    traMADoL (ULTRAM) 50 mg tablet Take 1 tablet (50 mg total) by mouth every 6 (six) hours as needed for Pain. 20 each 0     Current Facility-Administered Medications   Medication Dose Route Frequency Provider Last Rate Last Admin    triamcinolone acetonide injection 40 mg  40 mg Intramuscular 1 time in Clinic/HOD            Review of Systems   Constitutional:  Negative for chills, fatigue, fever and unexpected weight change.   HENT:  Negative for hearing loss and trouble swallowing.    Eyes:  Negative for photophobia and visual disturbance.   Respiratory:  Negative for cough, shortness of breath and wheezing.    Cardiovascular:  Negative for chest pain, palpitations and leg swelling.   Gastrointestinal:  Negative for abdominal pain and nausea.   Genitourinary:  Negative for dysuria and frequency.   Musculoskeletal:  Negative for arthralgias, back pain, gait problem, joint swelling, myalgias and neck pain.   Skin:  Negative for rash and wound.   Neurological:  Negative for tremors, seizures, weakness, numbness and headaches.   Hematological:  Does not bruise/bleed easily.   Psychiatric/Behavioral:  Negative for hallucinations.          Objective:        Physical Exam:   Foot Exam    General  General Appearance: appears stated age and  healthy   Orientation: alert and oriented to person, place, and time   Affect: appropriate   Gait: antalgic       Right Foot/Ankle     Inspection and Palpation  Ecchymosis: none  Tenderness: plantar fascia   Swelling: none   Arch: normal  Skin Exam: no drainage, no ulcer and no erythema   Neurovascular  Dorsalis pedis: 2+  Posterior tibial: 2+  Capillary Refill: 2+  Varicose veins: not present  Saphenous nerve sensation: normal  Tibial nerve sensation: normal  Superficial peroneal nerve sensation: normal  Deep peroneal nerve sensation: normal  Sural nerve sensation: normal    Edema  Type of edema: non-pitting    Muscle Strength  Ankle dorsiflexion: 5  Ankle plantar flexion: 5  Ankle inversion: 5  Ankle eversion: 5  Great toe extension: 5  Great toe flexion: 5    Range of Motion    Normal right ankle ROM    Tests  Anterior drawer: negative   Talar tilt: negative   PT Tinel's sign: negative    Paresthesia: negative  Comments  Pain on palpation of the infeior medial heel and central plantar heel. No pain present  with side to side compression of the calcaneus. Negative tinnel's sign  at the tarsal tunnel. Negative Moore's nerve pain. Negative Calcaneal nerve pain. No soft tissue masses. Pain present with dorsiflexion of the ankle. No edema, erythema, or ecchymosis noted.           Physical Exam  Cardiovascular:      Pulses:           Dorsalis pedis pulses are 2+ on the right side.        Posterior tibial pulses are 2+ on the right side.   Feet:      Right foot:      Skin integrity: No ulcer or erythema.               Right Ankle/Foot Exam     Range of Motion   The patient has normal right ankle ROM.    Comments:  Pain on palpation of the infeior medial heel and central plantar heel. No pain present  with side to side compression of the calcaneus. Negative tinnel's sign  at the tarsal tunnel. Negative Moore's nerve pain. Negative Calcaneal nerve pain. No soft tissue masses. Pain present with dorsiflexion of the ankle.  No edema, erythema, or ecchymosis noted.           Muscle Strength   Right Lower Extremity   Ankle Dorsiflexion:  5   Plantar flexion:  5/5    Vascular Exam     Right Pulses  Dorsalis Pedis:      2+  Posterior Tibial:      2+           Imaging: X-Ray Foot Complete Right  Narrative: EXAMINATION:  XR FOOT COMPLETE 3 VIEW RIGHT    CLINICAL HISTORY:  . Pain in right foot    TECHNIQUE:  AP, lateral, and oblique views of the right foot were performed.    COMPARISON:  Right foot x-rays of November 11, 2024    FINDINGS:  A fracture of the bones of the right foot is not seen.  Soft tissue swelling or foreign bodies are not noted.  Juxta-articular bone erosion or Osteoporosis is not seen.  Impression: Negative right foot radiographs.    Electronically signed by: Ant Greenfield MD  Date:    02/12/2025  Time:    09:13               Assessment:       1. Plantar fasciitis    2. Capsulitis of metatarsophalangeal (MTP) joint of right foot    3. Right foot pain      Plan:   Plantar fasciitis  -     HME - OTHER    Capsulitis of metatarsophalangeal (MTP) joint of right foot  -     HME - OTHER    Right foot pain  -     HME - OTHER    Other orders  -     triamcinolone acetonide injection 40 mg      Follow up in about 4 weeks (around 4/23/2025).    Procedures        Discussed different treatment options for heel pain. I gave written and verbal instructions on heel cord stretching and this was demonstrated for the patient. Patient expressed understanding. Discussed wearing appropriate shoe gear and avoiding flats, slippers, sandals, barefoot, and sockfeet. Recommended arch supports. My recommendation for OTC supports is Spenco polysorb replacement insoles or patient may elect more aggressive treatment with prescription arch supports. We also discussed cortisone injections and NSAID therapy.       Steroid Injection Right Plantar Fascia  Performed by; Devin YOUNG steroid injection was performed at right plantar fascia using 1% plain  Lidocaine and 1ml/40 mg of Kenalog. This was well tolerated.      Counseling:     I provided patient education verbally regarding:   Patient diagnosis, treatment options, as well as alternatives, risks, and benefits.     Begin boot, light stretching, icing, topical anti-inflammatory medication and a night splint.  Follow-up in 4 weeks or sooner if new problems arise or condition worsens.

## 2025-04-16 ENCOUNTER — LAB VISIT (OUTPATIENT)
Dept: LAB | Facility: HOSPITAL | Age: 50
End: 2025-04-16
Attending: FAMILY MEDICINE
Payer: COMMERCIAL

## 2025-04-16 ENCOUNTER — RESULTS FOLLOW-UP (OUTPATIENT)
Dept: FAMILY MEDICINE | Facility: CLINIC | Age: 50
End: 2025-04-16

## 2025-04-16 DIAGNOSIS — E55.9 VITAMIN D DEFICIENCY: ICD-10-CM

## 2025-04-16 DIAGNOSIS — D50.9 IRON DEFICIENCY ANEMIA, UNSPECIFIED IRON DEFICIENCY ANEMIA TYPE: ICD-10-CM

## 2025-04-16 DIAGNOSIS — R73.9 HYPERGLYCEMIA: ICD-10-CM

## 2025-04-16 DIAGNOSIS — Z00.00 ANNUAL PHYSICAL EXAM: ICD-10-CM

## 2025-04-16 LAB
25(OH)D3+25(OH)D2 SERPL-MCNC: 30 NG/ML (ref 30–96)
ABSOLUTE EOSINOPHIL (OHS): 0.39 K/UL
ABSOLUTE MONOCYTE (OHS): 0.56 K/UL (ref 0.3–1)
ABSOLUTE NEUTROPHIL COUNT (OHS): 2.96 K/UL (ref 1.8–7.7)
ALBUMIN SERPL BCP-MCNC: 3.7 G/DL (ref 3.5–5.2)
ALP SERPL-CCNC: 92 UNIT/L (ref 40–150)
ALT SERPL W/O P-5'-P-CCNC: 11 UNIT/L (ref 10–44)
ANION GAP (OHS): 7 MMOL/L (ref 8–16)
AST SERPL-CCNC: 16 UNIT/L (ref 11–45)
BASOPHILS # BLD AUTO: 0.06 K/UL
BASOPHILS NFR BLD AUTO: 1.1 %
BILIRUB SERPL-MCNC: 0.3 MG/DL (ref 0.1–1)
BUN SERPL-MCNC: 10 MG/DL (ref 6–20)
CALCIUM SERPL-MCNC: 9.7 MG/DL (ref 8.7–10.5)
CHLORIDE SERPL-SCNC: 107 MMOL/L (ref 95–110)
CHOLEST SERPL-MCNC: 158 MG/DL (ref 120–199)
CHOLEST/HDLC SERPL: 3.8 {RATIO} (ref 2–5)
CO2 SERPL-SCNC: 23 MMOL/L (ref 23–29)
CREAT SERPL-MCNC: 0.7 MG/DL (ref 0.5–1.4)
EAG (OHS): 117 MG/DL (ref 68–131)
ERYTHROCYTE [DISTWIDTH] IN BLOOD BY AUTOMATED COUNT: 13.7 % (ref 11.5–14.5)
FERRITIN SERPL-MCNC: 11 NG/ML (ref 20–300)
GFR SERPLBLD CREATININE-BSD FMLA CKD-EPI: >60 ML/MIN/1.73/M2
GLUCOSE SERPL-MCNC: 94 MG/DL (ref 70–110)
HBA1C MFR BLD: 5.7 % (ref 4–5.6)
HCT VFR BLD AUTO: 42.3 % (ref 37–48.5)
HDLC SERPL-MCNC: 42 MG/DL (ref 40–75)
HDLC SERPL: 26.6 % (ref 20–50)
HGB BLD-MCNC: 12.8 GM/DL (ref 12–16)
IMM GRANULOCYTES # BLD AUTO: 0.01 K/UL (ref 0–0.04)
IMM GRANULOCYTES NFR BLD AUTO: 0.2 % (ref 0–0.5)
IRON SATN MFR SERPL: 15 % (ref 20–50)
IRON SERPL-MCNC: 69 UG/DL (ref 30–160)
LDLC SERPL CALC-MCNC: 95.6 MG/DL (ref 63–159)
LYMPHOCYTES # BLD AUTO: 1.6 K/UL (ref 1–4.8)
MCH RBC QN AUTO: 25.1 PG (ref 27–31)
MCHC RBC AUTO-ENTMCNC: 30.3 G/DL (ref 32–36)
MCV RBC AUTO: 83 FL (ref 82–98)
NONHDLC SERPL-MCNC: 116 MG/DL
NUCLEATED RBC (/100WBC) (OHS): 0 /100 WBC
PLATELET # BLD AUTO: 447 K/UL (ref 150–450)
PMV BLD AUTO: 9.4 FL (ref 9.2–12.9)
POTASSIUM SERPL-SCNC: 3.9 MMOL/L (ref 3.5–5.1)
PROT SERPL-MCNC: 7.8 GM/DL (ref 6–8.4)
RBC # BLD AUTO: 5.09 M/UL (ref 4–5.4)
RELATIVE EOSINOPHIL (OHS): 7 %
RELATIVE LYMPHOCYTE (OHS): 28.7 % (ref 18–48)
RELATIVE MONOCYTE (OHS): 10 % (ref 4–15)
RELATIVE NEUTROPHIL (OHS): 53 % (ref 38–73)
SODIUM SERPL-SCNC: 137 MMOL/L (ref 136–145)
TIBC SERPL-MCNC: 466 UG/DL (ref 250–450)
TRANSFERRIN SERPL-MCNC: 315 MG/DL (ref 200–375)
TRIGL SERPL-MCNC: 102 MG/DL (ref 30–150)
WBC # BLD AUTO: 5.58 K/UL (ref 3.9–12.7)

## 2025-04-16 PROCEDURE — 84466 ASSAY OF TRANSFERRIN: CPT

## 2025-04-16 PROCEDURE — 83036 HEMOGLOBIN GLYCOSYLATED A1C: CPT

## 2025-04-16 PROCEDURE — 36415 COLL VENOUS BLD VENIPUNCTURE: CPT | Mod: PO

## 2025-04-16 PROCEDURE — 82465 ASSAY BLD/SERUM CHOLESTEROL: CPT

## 2025-04-16 PROCEDURE — 82306 VITAMIN D 25 HYDROXY: CPT

## 2025-04-16 PROCEDURE — 82040 ASSAY OF SERUM ALBUMIN: CPT

## 2025-04-16 PROCEDURE — 82728 ASSAY OF FERRITIN: CPT

## 2025-04-16 PROCEDURE — 85025 COMPLETE CBC W/AUTO DIFF WBC: CPT

## 2025-04-17 ENCOUNTER — TELEPHONE (OUTPATIENT)
Dept: PODIATRY | Facility: CLINIC | Age: 50
End: 2025-04-17
Payer: COMMERCIAL

## 2025-04-17 NOTE — TELEPHONE ENCOUNTER
Spoke with patient to moved her appointment up earlier on 04/28/2025 with Dr. Redd. Patient verbally confirmed new appointment date and time.

## 2025-04-25 ENCOUNTER — OFFICE VISIT (OUTPATIENT)
Dept: FAMILY MEDICINE | Facility: CLINIC | Age: 50
End: 2025-04-25
Payer: COMMERCIAL

## 2025-04-25 VITALS
WEIGHT: 143.94 LBS | DIASTOLIC BLOOD PRESSURE: 70 MMHG | HEIGHT: 64 IN | HEART RATE: 82 BPM | TEMPERATURE: 98 F | SYSTOLIC BLOOD PRESSURE: 110 MMHG | OXYGEN SATURATION: 99 % | BODY MASS INDEX: 24.57 KG/M2 | RESPIRATION RATE: 12 BRPM

## 2025-04-25 DIAGNOSIS — D50.9 IRON DEFICIENCY ANEMIA, UNSPECIFIED IRON DEFICIENCY ANEMIA TYPE: ICD-10-CM

## 2025-04-25 DIAGNOSIS — L40.9 PSORIASIS: ICD-10-CM

## 2025-04-25 DIAGNOSIS — Z12.11 COLON CANCER SCREENING: ICD-10-CM

## 2025-04-25 DIAGNOSIS — E55.9 VITAMIN D DEFICIENCY: ICD-10-CM

## 2025-04-25 DIAGNOSIS — Z79.1 NSAID LONG-TERM USE: ICD-10-CM

## 2025-04-25 DIAGNOSIS — R73.9 HYPERGLYCEMIA: ICD-10-CM

## 2025-04-25 DIAGNOSIS — Z00.00 ANNUAL PHYSICAL EXAM: Primary | ICD-10-CM

## 2025-04-25 PROCEDURE — 99999 PR PBB SHADOW E&M-EST. PATIENT-LVL IV: CPT | Mod: PBBFAC,,, | Performed by: FAMILY MEDICINE

## 2025-04-25 RX ORDER — IBUPROFEN 800 MG/1
800 TABLET ORAL EVERY 12 HOURS PRN
Qty: 30 TABLET | Refills: 1 | Status: SHIPPED | OUTPATIENT
Start: 2025-04-25

## 2025-04-25 RX ORDER — FERROUS SULFATE 324(65)MG
324 TABLET, DELAYED RELEASE (ENTERIC COATED) ORAL DAILY
Qty: 90 TABLET | Refills: 3 | Status: SHIPPED | OUTPATIENT
Start: 2025-04-25

## 2025-04-25 NOTE — PROGRESS NOTES
Subjective:       Patient ID: Preeti Garvey is a 50 y.o. female.    Chief Complaint: Follow-up (6mth f/u)    Problem List[1]  Patient is here for a chronic conditions follow up.    Reviewed labs 4/2025  History of Present Illness    CHIEF COMPLAINT:  Ms. Garvey presents today for foot pain and swelling.    MUSCULOSKELETAL:  She reports a swollen finger with dactylitis, which she attributes to psoriatic arthritis. She is currently taking Otezla as her first medication for psoriatic arthritis. She experiences lower back pain at level 4, which began as sciatic nerve pain one month ago after seeing Dr. Aceves. She uses ibuprofen for pain management.    FOOT PAIN:  She uses a specialized compound cream prescribed by foot specialist and wears a boot for protection during long-distance walking.    LABS AND MEDICATIONS:  She recently started iron supplements for low iron with mild anemia. Vitamin D is in the low normal range. A1C is 5.7, indicating pre-diabetes. Kidney and liver functions are normal.      ROS:  ROS findings as noted in HPI.        Review of Systems   Constitutional:  Negative for fatigue and unexpected weight change.   Respiratory:  Negative for chest tightness and shortness of breath.    Cardiovascular:  Negative for chest pain, palpitations and leg swelling.   Gastrointestinal:  Negative for abdominal pain.   Musculoskeletal:  Positive for arthralgias.   Neurological:  Negative for dizziness, syncope, light-headedness and headaches.      Relevant History:  Typically declines vaccines    Podiatry Dr. Power treating obdulio PF     Endocrine Prediabetes A1c 5.7. vit D normal     Heme/onc Dr. Khoobehi  low Iron and anemia      GYN [facility] Dr. Santos  following for dysmenorrhea and heavy bleeding.  Has iron def anemia  Dr. Carlos Sanchez utd pap and mammo. Takes progesterone for 10 days for very heavy periods which are improving on their own.  Had D & C . Had bout of      Derm On otezla for psoriasis  Elma Ramírez      Gi  due for colon cancer screening    Rheum Dr. Logan psoriatic arthritis  Objective:      Physical Exam  Vitals and nursing note reviewed.   Constitutional:       Appearance: She is well-developed.   Cardiovascular:      Rate and Rhythm: Normal rate and regular rhythm.      Heart sounds: Normal heart sounds.   Pulmonary:      Effort: Pulmonary effort is normal.      Breath sounds: Normal breath sounds.   Musculoskeletal:      Comments: Left 4th PIP joint swelling and limited rom, tender    Walking Boot on right foot    Skin:     General: Skin is warm and dry.   Neurological:      Mental Status: She is alert and oriented to person, place, and time.         Assessment:       ICD-10-CM ICD-9-CM    1. Annual physical exam  Z00.00 V70.0 CBC Auto Differential      Comprehensive Metabolic Panel      Hemoglobin A1C      Lipid Panel      2. Vitamin D deficiency  E55.9 268.9 Vitamin D      3. Iron deficiency anemia, unspecified iron deficiency anemia type  D50.9 280.9 Iron and TIBC      Ferritin      ferrous sulfate 324 mg (65 mg iron) TbEC      4. Hyperglycemia  R73.9 790.29 Hemoglobin A1C      5. Psoriasis  L40.9 696.1       6. Colon cancer screening  Z12.11 V76.51 Case Request Endoscopy: COLONOSCOPY      7. NSAID long-term use  Z79.1 V58.64 ibuprofen (ADVIL,MOTRIN) 800 MG tablet         Plan:   1. Annual physical exam (Primary)  Discussed health maintenance guidelines appropriate for age.      - CBC Auto Differential; Future  - Comprehensive Metabolic Panel; Future  - Hemoglobin A1C; Future  - Lipid Panel; Future    2. Vitamin D deficiency  Screen and treat as indicated:    - Vitamin D; Future    3. Iron deficiency anemia, unspecified iron deficiency anemia type  Add and monitor  - Iron and TIBC; Future  - Ferritin; Future  - ferrous sulfate 324 mg (65 mg iron) TbEC; Take 1 tablet (324 mg total) by mouth once daily.  Dispense: 90 tablet; Refill: 3    4. Hyperglycemia   Your blood sugar  is borderline high.  This means you are at risk for developing type 2 diabetes mellitus.  To lessen your risk you should exercise regularly, avoid excess carbohydrates and work toward a body mass index of less than 25.  ;u  - Hemoglobin A1C; Future    5. Psoriasis  Cont working with rheum/Derm to adjust meds to relieve persistent sx of pain and swelling in multiple joints-finger, foot and lower back    6. Colon cancer screening  refer  - Case Request Endoscopy: COLONOSCOPY    7. NSAID long-term use  Use prn  - ibuprofen (ADVIL,MOTRIN) 800 MG tablet; Take 1 tablet (800 mg total) by mouth every 12 (twelve) hours as needed for Pain.  Dispense: 30 tablet; Refill: 1  Assessment & Plan    - Discussed iron-rich foods: dark green leafy vegetables, broccoli, beans, shrimp, eggs, oysters, red meat, liver, sardines, dried fruits.  - Explained that vitamin C helps with iron absorption and recommend incorporating vitamin C-rich foods to improve iron absorption.  - Started iron supplements to address low iron levels.  - Ordered follow-up labs in 4 months to recheck iron levels.  - Continued vitamin D supplementation.  - Started ibuprofen for back pain, 400 mg dose split and taken no more than every 6 hours, stopping as soon as feeling better.         Time spent with patient: 20 minutes  Patient with be reevaluated in 4 months or sooner prn  Greater than 50% of this visit was spent counseling as described in above documentation:Yes  This note was generated with the assistance of ambient listening technology. Verbal consent was obtained by the patient and accompanying visitor(s) for the recording of patient appointment to facilitate this note. I attest to having reviewed and edited the generated note for accuracy, though some syntax or spelling errors may persist. Please contact the author of this note for any clarification.            [1]   Patient Active Problem List  Diagnosis    Allergic rhinitis due to pollen    Muscle spasm     Psoriasis    Iron deficiency anemia    Drug-induced immunodeficiency    DUB (dysfunctional uterine bleeding)    Vitamin D deficiency    Hyperglycemia    Psoriatic arthritis

## 2025-04-28 ENCOUNTER — PATIENT MESSAGE (OUTPATIENT)
Dept: GASTROENTEROLOGY | Facility: CLINIC | Age: 50
End: 2025-04-28
Payer: COMMERCIAL

## 2025-04-28 ENCOUNTER — OFFICE VISIT (OUTPATIENT)
Dept: PODIATRY | Facility: CLINIC | Age: 50
End: 2025-04-28
Payer: COMMERCIAL

## 2025-04-28 VITALS
BODY MASS INDEX: 24.71 KG/M2 | HEIGHT: 64 IN | SYSTOLIC BLOOD PRESSURE: 125 MMHG | DIASTOLIC BLOOD PRESSURE: 81 MMHG | HEART RATE: 82 BPM

## 2025-04-28 DIAGNOSIS — M79.671 RIGHT FOOT PAIN: ICD-10-CM

## 2025-04-28 DIAGNOSIS — M79.671 PAIN IN BOTH FEET: ICD-10-CM

## 2025-04-28 DIAGNOSIS — M79.672 PAIN IN BOTH FEET: ICD-10-CM

## 2025-04-28 DIAGNOSIS — L40.50 PSORIATIC ARTHRITIS: ICD-10-CM

## 2025-04-28 DIAGNOSIS — M72.2 PLANTAR FASCIITIS: Primary | ICD-10-CM

## 2025-04-28 PROCEDURE — 3044F HG A1C LEVEL LT 7.0%: CPT | Mod: CPTII,S$GLB,, | Performed by: PODIATRIST

## 2025-04-28 PROCEDURE — 3074F SYST BP LT 130 MM HG: CPT | Mod: CPTII,S$GLB,, | Performed by: PODIATRIST

## 2025-04-28 PROCEDURE — 99214 OFFICE O/P EST MOD 30 MIN: CPT | Mod: S$GLB,,, | Performed by: PODIATRIST

## 2025-04-28 PROCEDURE — 3008F BODY MASS INDEX DOCD: CPT | Mod: CPTII,S$GLB,, | Performed by: PODIATRIST

## 2025-04-28 PROCEDURE — 99999 PR PBB SHADOW E&M-EST. PATIENT-LVL III: CPT | Mod: PBBFAC,,, | Performed by: PODIATRIST

## 2025-04-28 PROCEDURE — 3079F DIAST BP 80-89 MM HG: CPT | Mod: CPTII,S$GLB,, | Performed by: PODIATRIST

## 2025-04-28 RX ORDER — METHYLPREDNISOLONE 4 MG/1
TABLET ORAL
Qty: 21 TABLET | Refills: 1 | Status: SHIPPED | OUTPATIENT
Start: 2025-04-28 | End: 2025-05-19

## 2025-04-28 NOTE — PROGRESS NOTES
Subjective:      Patient ID: Preeti Garvey is a 50 y.o. female.    Chief Complaint: Disease Management    HPI    Rheumatologic History:      - Diagnosis/es:              - Psoriasis (patient states biopsy proven) diagnosed around               - Psoriatic arthritis diagnosed 25 on the basis of personal history of psoriasis, inflammatory arthritis, dactylitis, enthesitis. No uveitis, inflammatory back pain, GI symptoms.   - Family History: No autoimmune conditions  - Gyn History:  (1001); no VTE  - Social History: never smoker, rare alcohol intake   - Positive serologies: DAIJA 1:320 homogenous  - Negative serologies: dsDNA, SSA, SSB, Sm, RNP, RF  - Infectious screening labs: Negative HIV, hepatitis B and C (3/21/25), quantiferon not drawn  - Imaging:              - Xray right foot (24) No fracture or dislocation. No significant degenerative change. The soft tissues are unremarkable.   - Previous Treatments:              - prednisone 10mg:effective              - meloxicam: ineffective              - Otezla 30mg BID (-2024)   - Current Treatments:               - Ibuprofen 800mg daily  - Humira biweekly: requesting PA  - Family Planning: Still menstruating, but on Provera. No plans for pregnancy.   Initial History:   In 2024, she developed pain and swelling in her knees, antalgic gait requiring a walker, and pain and swelling in the 2nd digit of the right hand. Her PCP gave her a round of steroids that made the joint pain and swelling resolve completely. She still reports some pain and swelling in her right 2nd digit. She does report a history of plantar fasciitis and capsulitis in the right foot. The Otezla is working for her skin, but she still has residual pain and swelling in her knees and left right digit. Physical examination shows swelling in the knees and dactylitis of the right 2nd digit. I suspect psoriatic arthritis. We discussed potential treatment options including  "increasing the dose of ibuprofen for a month and monitoring response, methotrexate low dose, and Humira. She prefers to try ibuprofen first then if she fails will consider MTX. She is hesitant about injectables.      Interval History:   She reports dactylitis involving her right toe, left knuckle pain and swelling, and active psoriasis. She also reports back pain aggravated by activity and improved with rest.     Objective:   BP (!) 144/82   Pulse 103   Ht 5' 4.02" (1.626 m)   Wt 64.1 kg (141 lb 5 oz)   LMP 04/20/2025 (Exact Date)   BMI 24.24 kg/m²   Physical Exam   Constitutional: normal appearance.   HENT:   Head: Normocephalic and atraumatic.   Cardiovascular: Normal rate, regular rhythm and normal heart sounds.   Pulmonary/Chest: Effort normal and breath sounds normal.   Musculoskeletal:      Comments: Swollen 4th left PIP  Dactylitis, 3rd right toe     Neurological: She is alert.   Skin: Skin is warm and dry. Rash noted.   Psoriatic plaques elbows     No data to display     Labs (4/16/25)  CBC WBC, HGB, PLT WNL  CMP CR, AST, ALT WNL   Vit D WNL     Assessment:     1. Psoriatic arthritis    2. Psoriasis    3. Drug-induced immunodeficiency    4. High risk medications (not anticoagulants) long-term use      This is a 50 year old woman with allergic rhinitis, ROBERT, vitamin D deficiency,  psoriasis  and psoriatic arthritis on Otezla and ibuprofen 800mg daily. She reports dactylitis involving her right toe, left knuckle pain and swelling, and active psoriasis. She also reports back pain aggravated by activity and improved with rest. She states her dermatologist's preference is for her to switch to Humira.  DC Otezla and request prior authorization for Humira.    Plan:     Problem List Items Addressed This Visit          Derm    Psoriasis    Relevant Medications    adalimumab (HUMIRA,CF, PEN) 40 mg/0.4 mL PnKt    Other Relevant Orders    CBC Auto Differential    Sedimentation rate    C-Reactive Protein    " Creatinine, Serum    AST (SGOT)    ALT (SGPT)       Immunology/Multi System    Drug-induced immunodeficiency    Relevant Medications    adalimumab (HUMIRA,CF, PEN) 40 mg/0.4 mL PnKt    Other Relevant Orders    CBC Auto Differential    Sedimentation rate    C-Reactive Protein    Creatinine, Serum    AST (SGOT)    ALT (SGPT)       Orthopedic    Psoriatic arthritis - Primary    Relevant Medications    adalimumab (HUMIRA,CF, PEN) 40 mg/0.4 mL PnKt    predniSONE (DELTASONE) 5 MG tablet    Other Relevant Orders    CBC Auto Differential    Sedimentation rate    C-Reactive Protein    Creatinine, Serum    AST (SGOT)    ALT (SGPT)     Other Visit Diagnoses         High risk medications (not anticoagulants) long-term use              1.) Psoriasis  2.) Psoriatic arthritis   3.) Long term NSAID use  4.) Drug induced immunodeficiency  5.) High risk medication use  - Request PA for Humira biweekly. I discussed potential side effects including injection site reactions, malignancy, infection, blood count, liver and kidney function abnormalities.  Hold for infection. ACR patient information sheet on TNF inhibitors was provided.  - Prednisone 5mg daily  - CBC, CMP, ESR, CRP before follow up  - Pre-DMARD labs: quantiferon  - Immunizations: I recommended HD flu, PCV20, ad Shingrix in anticipation of potential DMARD therapy    Follow up in 3 months    40 minutes of total time spent on the encounter, which includes face to face time and non-face to face time preparing to see the patient (eg, review of tests), Obtaining and/or reviewing separately obtained history, Documenting clinical information in the electronic or other health record, Independently interpreting results (not separately reported) and communicating results to the patient/family/caregiver, or Care coordination (not separately reported).     This note was prepared with [employer] voice recognition transcription software. Garbled syntax, mangled pronouns, and  other bizarre constructions may be attributed to that software system       Lindsay Logan M.D.  Rheumatology Dept  [place], [place]

## 2025-04-29 ENCOUNTER — OFFICE VISIT (OUTPATIENT)
Dept: RHEUMATOLOGY | Facility: CLINIC | Age: 50
End: 2025-04-29
Payer: COMMERCIAL

## 2025-04-29 ENCOUNTER — LAB VISIT (OUTPATIENT)
Dept: LAB | Facility: HOSPITAL | Age: 50
End: 2025-04-29
Attending: STUDENT IN AN ORGANIZED HEALTH CARE EDUCATION/TRAINING PROGRAM
Payer: COMMERCIAL

## 2025-04-29 VITALS
BODY MASS INDEX: 24.13 KG/M2 | SYSTOLIC BLOOD PRESSURE: 144 MMHG | HEIGHT: 64 IN | WEIGHT: 141.31 LBS | HEART RATE: 103 BPM | DIASTOLIC BLOOD PRESSURE: 82 MMHG

## 2025-04-29 DIAGNOSIS — Z79.899 HIGH RISK MEDICATIONS (NOT ANTICOAGULANTS) LONG-TERM USE: ICD-10-CM

## 2025-04-29 DIAGNOSIS — Z51.81 MEDICATION MONITORING ENCOUNTER: ICD-10-CM

## 2025-04-29 DIAGNOSIS — Z79.1 NSAID LONG-TERM USE: ICD-10-CM

## 2025-04-29 DIAGNOSIS — R76.8 POSITIVE ANA (ANTINUCLEAR ANTIBODY): ICD-10-CM

## 2025-04-29 DIAGNOSIS — L40.9 PSORIASIS: ICD-10-CM

## 2025-04-29 DIAGNOSIS — Z79.899 DRUG-INDUCED IMMUNODEFICIENCY: ICD-10-CM

## 2025-04-29 DIAGNOSIS — D84.821 DRUG-INDUCED IMMUNODEFICIENCY: ICD-10-CM

## 2025-04-29 DIAGNOSIS — L40.50 PSORIATIC ARTHRITIS: ICD-10-CM

## 2025-04-29 DIAGNOSIS — L40.50 PSORIATIC ARTHRITIS: Primary | ICD-10-CM

## 2025-04-29 PROCEDURE — 36415 COLL VENOUS BLD VENIPUNCTURE: CPT | Mod: PO

## 2025-04-29 PROCEDURE — 3008F BODY MASS INDEX DOCD: CPT | Mod: CPTII,S$GLB,, | Performed by: STUDENT IN AN ORGANIZED HEALTH CARE EDUCATION/TRAINING PROGRAM

## 2025-04-29 PROCEDURE — 3044F HG A1C LEVEL LT 7.0%: CPT | Mod: CPTII,S$GLB,, | Performed by: STUDENT IN AN ORGANIZED HEALTH CARE EDUCATION/TRAINING PROGRAM

## 2025-04-29 PROCEDURE — 99215 OFFICE O/P EST HI 40 MIN: CPT | Mod: S$GLB,,, | Performed by: STUDENT IN AN ORGANIZED HEALTH CARE EDUCATION/TRAINING PROGRAM

## 2025-04-29 PROCEDURE — 1159F MED LIST DOCD IN RCRD: CPT | Mod: CPTII,S$GLB,, | Performed by: STUDENT IN AN ORGANIZED HEALTH CARE EDUCATION/TRAINING PROGRAM

## 2025-04-29 PROCEDURE — 3077F SYST BP >= 140 MM HG: CPT | Mod: CPTII,S$GLB,, | Performed by: STUDENT IN AN ORGANIZED HEALTH CARE EDUCATION/TRAINING PROGRAM

## 2025-04-29 PROCEDURE — 3079F DIAST BP 80-89 MM HG: CPT | Mod: CPTII,S$GLB,, | Performed by: STUDENT IN AN ORGANIZED HEALTH CARE EDUCATION/TRAINING PROGRAM

## 2025-04-29 PROCEDURE — 99999 PR PBB SHADOW E&M-EST. PATIENT-LVL III: CPT | Mod: PBBFAC,,, | Performed by: STUDENT IN AN ORGANIZED HEALTH CARE EDUCATION/TRAINING PROGRAM

## 2025-04-29 PROCEDURE — 86480 TB TEST CELL IMMUN MEASURE: CPT

## 2025-04-29 PROCEDURE — 1160F RVW MEDS BY RX/DR IN RCRD: CPT | Mod: CPTII,S$GLB,, | Performed by: STUDENT IN AN ORGANIZED HEALTH CARE EDUCATION/TRAINING PROGRAM

## 2025-04-29 RX ORDER — ADALIMUMAB 40MG/0.4ML
40 KIT SUBCUTANEOUS
Qty: 6 PEN | Refills: 3 | Status: ACTIVE | OUTPATIENT
Start: 2025-04-29 | End: 2026-04-29

## 2025-04-29 RX ORDER — PREDNISONE 5 MG/1
5 TABLET ORAL DAILY
Qty: 90 TABLET | Refills: 3 | Status: SHIPPED | OUTPATIENT
Start: 2025-04-29 | End: 2026-04-29

## 2025-05-01 ENCOUNTER — RESULTS FOLLOW-UP (OUTPATIENT)
Dept: RHEUMATOLOGY | Facility: CLINIC | Age: 50
End: 2025-05-01

## 2025-05-01 LAB
MITOGEN MINUS NIL (OHS): 1.09
NIL TB SYNCED (OHS): 0
QUANTIFERON GOLD INTERP (OHS): NEGATIVE
TB1 AG MINUS NIL (OHS): 0.02
TB2 AG MINUS NIL (OHS): 0.03

## 2025-05-05 ENCOUNTER — TELEPHONE (OUTPATIENT)
Dept: FAMILY MEDICINE | Facility: CLINIC | Age: 50
End: 2025-05-05
Payer: COMMERCIAL

## 2025-05-05 NOTE — TELEPHONE ENCOUNTER
----- Message from Renee sent at 5/5/2025  3:41 PM CDT -----  Contact: self  Type:  Needs Medical AdviceWho Called: pt Symptoms (please be specific):  How long has patient had these symptoms:  Pharmacy name and phone #:  Would the patient rather a call back or a response via [facility]? callBest Call Back Number: [phone] Information: pt states she needs a nurse visit for a few shots asking office to give her a call to go over the shots she needs would like Friday would work for her   Please call back to advise. Thanks!

## 2025-05-06 ENCOUNTER — TELEPHONE (OUTPATIENT)
Dept: RHEUMATOLOGY | Facility: CLINIC | Age: 50
End: 2025-05-06
Payer: COMMERCIAL

## 2025-05-06 NOTE — TELEPHONE ENCOUNTER
[facility] pharmD reached out to me. Stated that patient is interested in coming to the office for injection training. She is planning to get a few vaccines and starting treatment a little after    Staff,  Please call patient to schedule her for an appointment with me for Humira injection training. Thanks!

## 2025-05-07 ENCOUNTER — TELEPHONE (OUTPATIENT)
Dept: FAMILY MEDICINE | Facility: CLINIC | Age: 50
End: 2025-05-07
Payer: COMMERCIAL

## 2025-05-07 DIAGNOSIS — Z23 NEED FOR PNEUMOCOCCAL VACCINATION: Primary | ICD-10-CM

## 2025-05-07 NOTE — TELEPHONE ENCOUNTER
----- Message from Elva sent at 5/7/2025  9:23 AM CDT -----  Contact: pt  Type: [facility]eds Medical Advice [facility]:[phone] Information: Requesting a call back regarding  Pt is asking for the office to set her a nurse visit for pneumonia vaccine. Pt said her Rheum provider is requiring it before she starts a rx. Pt is not wanting to go to pharm to have vaccine.  Pt looking for Friday afternoon Please Advise- Thank you  ----- Message -----  From: Elva Tolentino  Sent: 5/7/2025   9:25 AM CDT  To: Neri LEON Staff    Type: [facility]eds Medical Advice [facility] Number:[phone] Information: Requesting a call back regarding  Pt is asking for the office to set her a nurse visit for pneumonia vaccine. Pt said her Rheum provider is requiring it before she starts a rx. Pt is not wanting to go to pharm to have vaccine. Please Advise- Thank you

## 2025-05-09 ENCOUNTER — CLINICAL SUPPORT (OUTPATIENT)
Dept: FAMILY MEDICINE | Facility: CLINIC | Age: 50
End: 2025-05-09
Payer: COMMERCIAL

## 2025-05-09 DIAGNOSIS — Z23 NEED FOR PNEUMOCOCCAL VACCINATION: Primary | ICD-10-CM

## 2025-05-09 NOTE — PROGRESS NOTES
"    Patient's PCP:Carina He MD  Referring Provider: No ref. provider found    Subjective:      Chief Complaint:: Foot Pain (Right foot feels like little needles and soreness under toes)      Foot Injury   Pertinent negatives include no numbness.   Foot Pain  Pertinent negatives include no abdominal pain, arthralgias, chest pain, chills, coughing, fatigue, fever, headaches, joint swelling, myalgias, nausea, neck pain, numbness, rash or weakness.     Preeti Garvey is a 50 y.o. female who presents today with a complaint of RT foot injury. The current episode started 3 days ago.  The symptoms include swelling, throbbing, sharp pain. Probable cause of complaint walking on uneven surface.  The symptoms are aggravated by unknown. The problem has stayed the same. Treatment to date have included ice boot, OTC pain medication.  Overall improving with topical and oral anti-inflammatory/stretching/icing/splinting.    Vitals:    04/28/25 1130   BP: 125/81   Pulse: 82   Height: 5' 4" (1.626 m)   PainSc:   5   PainLoc: Foot      Shoe Size: 7.5    History reviewed. No pertinent surgical history.  History reviewed. No pertinent past medical history.  Family History   Problem Relation Name Age of Onset    Melanoma Neg Hx      Psoriasis Neg Hx      Lupus Neg Hx      Eczema Neg Hx          Social History:   Marital Status:   Alcohol History:  reports current alcohol use.  Tobacco History:  reports that she has never smoked. She has never used smokeless tobacco.  Drug History:  reports no history of drug use.    Review of patient's allergies indicates:   Allergen Reactions    Bactrim [sulfamethoxazole-trimethoprim] Rash       Current Outpatient Medications   Medication Sig Dispense Refill    desonide (DESOWEN) 0.05 % cream       ergocalciferol (ERGOCALCIFEROL) 50,000 unit Cap Take 1 capsule (50,000 Units total) by mouth every 7 days. 12 capsule 1    ferrous sulfate 324 mg (65 mg iron) TbEC Take 1 tablet (324 mg total) " by mouth once daily. 90 tablet 3    ibuprofen (ADVIL,MOTRIN) 800 MG tablet Take 1 tablet (800 mg total) by mouth every 12 (twelve) hours as needed for Pain. 30 tablet 1    medroxyPROGESTERone (PROVERA) 10 MG tablet Take 1 tablet by mouth 2 (two) times daily.      traMADoL (ULTRAM) 50 mg tablet Take 1 tablet (50 mg total) by mouth every 6 (six) hours as needed for Pain. 20 each 0    adalimumab (HUMIRA,CF, PEN) 40 mg/0.4 mL PnKt Inject 0.4 mLs (40 mg total) into the skin every 14 (fourteen) days. 6 pen 3    methylPREDNISolone (MEDROL DOSEPACK) 4 mg tablet use as directed 21 tablet 1    predniSONE (DELTASONE) 5 MG tablet Take 1 tablet (5 mg total) by mouth once daily. 90 tablet 3     No current facility-administered medications for this visit.       Review of Systems   Constitutional:  Negative for chills, fatigue, fever and unexpected weight change.   HENT:  Negative for hearing loss and trouble swallowing.    Eyes:  Negative for photophobia and visual disturbance.   Respiratory:  Negative for cough, shortness of breath and wheezing.    Cardiovascular:  Negative for chest pain, palpitations and leg swelling.   Gastrointestinal:  Negative for abdominal pain and nausea.   Genitourinary:  Negative for dysuria and frequency.   Musculoskeletal:  Negative for arthralgias, back pain, gait problem, joint swelling, myalgias and neck pain.   Skin:  Negative for rash and wound.   Neurological:  Negative for tremors, seizures, weakness, numbness and headaches.   Hematological:  Does not bruise/bleed easily.   Psychiatric/Behavioral:  Negative for hallucinations.          Objective:        Physical Exam:   Foot Exam    General  General Appearance: appears stated age and healthy   Orientation: alert and oriented to person, place, and time   Affect: appropriate   Gait: antalgic       Right Foot/Ankle     Inspection and Palpation  Ecchymosis: none  Tenderness: plantar fascia   Swelling: none   Arch: normal  Skin Exam: no drainage, no  ulcer and no erythema   Neurovascular  Dorsalis pedis: 2+  Posterior tibial: 2+  Capillary Refill: 2+  Varicose veins: not present  Saphenous nerve sensation: normal  Tibial nerve sensation: normal  Superficial peroneal nerve sensation: normal  Deep peroneal nerve sensation: normal  Sural nerve sensation: normal    Edema  Type of edema: non-pitting    Muscle Strength  Ankle dorsiflexion: 5  Ankle plantar flexion: 5  Ankle inversion: 5  Ankle eversion: 5  Great toe extension: 5  Great toe flexion: 5    Range of Motion    Normal right ankle ROM    Tests  Anterior drawer: negative   Talar tilt: negative   PT Tinel's sign: negative    Paresthesia: negative  Comments  Mild tenderness on palpation of the infeior medial heel and central plantar heel. No pain present  with side to side compression of the calcaneus. Negative tinnel's sign  at the tarsal tunnel. Negative Moore's nerve pain. Negative Calcaneal nerve pain. No soft tissue masses. Pain present with dorsiflexion of the ankle. No edema, erythema, or ecchymosis noted.           Physical Exam  Cardiovascular:      Pulses:           Dorsalis pedis pulses are 2+ on the right side.        Posterior tibial pulses are 2+ on the right side.   Feet:      Right foot:      Skin integrity: No ulcer or erythema.               Right Ankle/Foot Exam     Range of Motion   The patient has normal right ankle ROM.    Comments:  Mild tenderness on palpation of the infeior medial heel and central plantar heel. No pain present  with side to side compression of the calcaneus. Negative tinnel's sign  at the tarsal tunnel. Negative Moore's nerve pain. Negative Calcaneal nerve pain. No soft tissue masses. Pain present with dorsiflexion of the ankle. No edema, erythema, or ecchymosis noted.           Muscle Strength   Right Lower Extremity   Ankle Dorsiflexion:  5   Plantar flexion:  5/5    Vascular Exam     Right Pulses  Dorsalis Pedis:      2+  Posterior Tibial:      2+            Imaging: X-Ray Foot Complete Right  Narrative: EXAMINATION:  XR FOOT COMPLETE 3 VIEW RIGHT    CLINICAL HISTORY:  . Pain in right foot    TECHNIQUE:  AP, lateral, and oblique views of the right foot were performed.    COMPARISON:  Right foot x-rays of November 11, 2024    FINDINGS:  A fracture of the bones of the right foot is not seen.  Soft tissue swelling or foreign bodies are not noted.  Juxta-articular bone erosion or Osteoporosis is not seen.  Impression: Negative right foot radiographs.    Electronically signed by: Ant Greenfield MD  Date:    02/12/2025  Time:    09:13               Assessment:       1. Plantar fasciitis    2. Pain in both feet    3. Right foot pain    4. Psoriatic arthritis      Plan:   Plantar fasciitis    Pain in both feet    Right foot pain    Psoriatic arthritis    Other orders  -     methylPREDNISolone (MEDROL DOSEPACK) 4 mg tablet; use as directed  Dispense: 21 tablet; Refill: 1      Follow up if symptoms worsen or fail to improve.      I recommended patient be fitted for orthoses.  I explained that orthoses may improve function of the foot, reduce pain, decrease pronation, increase efficiency of muscle function of the foot and ankle and prevent surgery.  Alternative forms of biomechanical control of the foot and ankle were discussed with the patient.      Continue stretching, icing, topical anti-inflammatory medication and a night splint.      Follow-up in 2 months or sooner if new problems arise or condition worsens.

## 2025-05-14 DIAGNOSIS — Z12.31 OTHER SCREENING MAMMOGRAM: ICD-10-CM

## 2025-06-02 ENCOUNTER — PATIENT MESSAGE (OUTPATIENT)
Dept: RHEUMATOLOGY | Facility: CLINIC | Age: 50
End: 2025-06-02
Payer: COMMERCIAL

## 2025-06-02 ENCOUNTER — TELEPHONE (OUTPATIENT)
Dept: RHEUMATOLOGY | Facility: CLINIC | Age: 50
End: 2025-06-02
Payer: COMMERCIAL

## 2025-06-05 ENCOUNTER — PATIENT MESSAGE (OUTPATIENT)
Dept: RHEUMATOLOGY | Facility: CLINIC | Age: 50
End: 2025-06-05
Payer: COMMERCIAL

## 2025-06-06 ENCOUNTER — OFFICE VISIT (OUTPATIENT)
Dept: RHEUMATOLOGY | Facility: CLINIC | Age: 50
End: 2025-06-06
Payer: COMMERCIAL

## 2025-06-06 VITALS
WEIGHT: 140.44 LBS | HEIGHT: 64 IN | DIASTOLIC BLOOD PRESSURE: 75 MMHG | SYSTOLIC BLOOD PRESSURE: 131 MMHG | HEART RATE: 85 BPM | BODY MASS INDEX: 23.98 KG/M2

## 2025-06-06 DIAGNOSIS — Z71.85 IMMUNIZATION COUNSELING: ICD-10-CM

## 2025-06-06 DIAGNOSIS — L40.50 PSORIATIC ARTHRITIS: Primary | ICD-10-CM

## 2025-06-06 DIAGNOSIS — L40.9 PSORIASIS: ICD-10-CM

## 2025-06-06 DIAGNOSIS — Z51.81 MEDICATION MONITORING ENCOUNTER: ICD-10-CM

## 2025-06-06 PROCEDURE — 99499 UNLISTED E&M SERVICE: CPT | Mod: S$GLB,,, | Performed by: STUDENT IN AN ORGANIZED HEALTH CARE EDUCATION/TRAINING PROGRAM

## 2025-06-06 PROCEDURE — 99999 PR PBB SHADOW E&M-EST. PATIENT-LVL III: CPT | Mod: PBBFAC,,,

## 2025-06-06 NOTE — PATIENT INSTRUCTIONS
It was a pleasure meeting you today. As a reminder, my name is Dr. Lilly Koenig. I'm the clinical pharmacist in the Rheumatology office. If you have any questions or need any assistance, please reach out to the office.    Next Humira injection on Friday, 6/20/2025     Please wait at least 7 days after receiving your first Humira injection before receiving any vaccines    - Vaccines:   COVID vaccine: 2 new 8312-2691 formulated doses 2-6 months apart - Local pharmacy    Shingles (Shingrix): 2nd dose on 7/21/25 or later - PCP's office or local pharmacy  Respiratory syncytial virus (RSV): 1 dose - Local pharmacy   Influenza (Flu): 1 dose annually starting Sept.; high dose recommended - Local pharmacy or PCP's office  Tetanus (Tdap): 1 dose every 10 years; due 3/2027 - Local pharmacy

## 2025-06-06 NOTE — PROGRESS NOTES
Time: 46 mins  # of DX: 4    Subjective:     Patient ID:  Preeti Garvey    Chief Complaint: Biologic education, training, and observation     Referring Provider: Lindsay Logan MD    History of Present Illness:  Pt is a 50 y.o. female who presents to the clinic for Humira injection training. This is a new patient to me but has been following with Lindsay Logan MD. Patient is presenting with allergic rhinitis, ROBERT, vitamin D deficiency, psoriasis and psoriatic arthritis. Last clinic visit was 4/29/2025.  Patient presented today with her spouse.    Rheumatologic History:   - Previous Treatments:  prednisone 10mg:effective  meloxicam: ineffective  Otezla 30mg BID (2017-4/2024)     - Current Treatments:               Prednisone 5 mg daily  Ibuprofen 800mg daily  Humira 40 mg SC every other week - starting today    PsA & PsO:  Current therapy: Humira 40 mg SC every other week  Dispensing pharmacy: [facility]   Patient's spouse may help assist patient with injection  She completed injection successfully but would like to come back to the office for injection guidance in 2 weeks for the next Humira dose    Vaccines:  Patient is due for Influenza (annually), COVID, Shingrix (2 doses 2-6 months apart once in lifetime), and RSV (once if 60 years old or older)    Influenza: Discuss and recommend annually - high dose. Due next flu season starting around Sept./Oct.   PCV 20: 5/2025  Tetanus (TdaP): Discuss and recommend booster every 10 years. 3/2017. Due 3/2027  Shingrix: Discuss and recommend. 2 dose series 2-6 months apart. 1 dose 5/2025. Due 7/2025 or later for 2nd dose  Hepatitis B: Immune   Covid: CDC recommends 2 new 2265-7790 doses 2-6 months apart in immunocompromised patients that has received 2 or more COVID doses  RSV: Discuss and recommend 1 dose    Current Medications:  Current Outpatient Medications   Medication Instructions    adalimumab (HUMIRA,CF, PEN) 40 mg/0.4 mL PnKt Inject 1 pen (40 mg total)  "into the skin every 14 (fourteen) days.    desonide (DESOWEN) 0.05 % cream     ergocalciferol (ERGOCALCIFEROL) 50,000 Units, Oral, Every 7 days    ferrous sulfate 324 mg, Oral, Daily    ibuprofen (ADVIL,MOTRIN) 800 mg, Oral, Every 12 hours PRN    medroxyPROGESTERone (PROVERA) 10 MG tablet 1 tablet, 2 times daily    predniSONE (DELTASONE) 5 mg, Oral, Daily    traMADoL (ULTRAM) 50 mg, Oral, Every 6 hours PRN     Objective:     Vitals:    06/06/25 1509   BP: 131/75   Pulse: 85   Weight: 63.7 kg (140 lb 6.9 oz)   Height: 5' 4.02" (1.626 m)   PainSc:   3   PainLoc: Foot      BP Readings from Last 4 Encounters:   04/29/25 (!) 144/82   04/28/25 125/81   04/25/25 110/70   03/28/25 138/76     Body mass index is 24.09 kg/m².         6/5/2025     9:00 PM   Rapid3 Question Responses and Scores   MDHAQ Score 0.3   RAPID3 Score 3.17 (OPA)     Monitoring Lab Results:  Lab Results   Component Value Date    WBC 5.58 04/16/2025    RBC 5.09 04/16/2025    HGB 12.8 04/16/2025    HCT 42.3 04/16/2025    MCV 83 04/16/2025    MCH 25.1 (L) 04/16/2025    MCHC 30.3 (L) 04/16/2025    RDW 13.7 04/16/2025     04/16/2025      Lab Results   Component Value Date     04/16/2025    K 3.9 04/16/2025     04/16/2025    CO2 23 04/16/2025    GLU 94 04/16/2025    BUN 10 04/16/2025    CREATININE 0.7 04/16/2025    CALCIUM 9.7 04/16/2025    PROT 7.8 04/16/2025    ALBUMIN 3.7 04/16/2025    BILITOT 0.3 04/16/2025    ALKPHOS 92 04/16/2025    AST 16 04/16/2025    ALT 11 04/16/2025    ANIONGAP 7 (L) 04/16/2025    EGFRNORACEVR >60 04/16/2025     Lab Results   Component Value Date    SEDRATE 12 03/21/2025    CRP 7.3 12/06/2024      Lab Results   Component Value Date    RF <13.0 11/19/2024     Lab Results   Component Value Date    ANASCREEN Positive (A) 11/19/2024    ANATITER 1:320 11/19/2024    DSDNA Negative 1:10 11/19/2024     No results found for: "HLABB27"    Infectious Disease Screening:  Lab Results   Component Value Date    HEPBSAG " Non-reactive 03/21/2025    HEPBCAB Non-reactive 03/21/2025    HEPBSAB 778.61 03/21/2025    HEPBSAB Reactive 03/21/2025     Lab Results   Component Value Date    HEPCAB Non-reactive 03/21/2025     Lab Results   Component Value Date    QUANTTBGDPL Negative 04/29/2025     Assessment:     New start Humira for psoriatic arthritis & PsO. Patient was referred to the Rheumatology pharmacist for injection education. Pharmacy sent biologic to patient's home and was brought in for injection. Counseled patient on mechanism of action, route of administration, dose and frequency of adalimumab (HUMIRA). Discussed side effect profile, risks and benefits of Humira. Demonstrated proper injection technique, discussed step by step instructions. Explained importance of vaccines considering the increased risk of infections. Encouraged patient to practice proper hand hygiene considering increased risk of infection with biologics and to avoid raw seafood/live vaccines. Reviewed needed labs to ensure medication is being used safely.      Plan:      Encounter Diagnoses   Name Primary?    Psoriatic arthritis Yes    Psoriasis     Medication monitoring encounter     Immunization counseling      1. Injection Training/Education  Provided patient with resources and educational material on Humira  Patient verbalized understanding instructions and was able to demonstrate proper technique using teach back method  Patient self-administered first dose in clinic.  Checked injection site after 20 mins and no reaction observed. Patient left office in stable condition.   Patient to observe injection site for the next few days and call the office if they notice any injection site issues or side effects   ND: 6/20/2025  Patient to make us aware if they ever experiences s/sx of an infection including fever, chills, URI symptoms or UTI symptoms.    2. Health Maintenance  Vaccines needed: 2 new COVID doses, Shingrix 2nd dose, RSV, and TdaP 3/2027   Patient  agreed to complete vaccines   Advised pt to wait at least 7 days before getting any immunizations after receiving first dose of current biologic    Follow-up scheduled on 7/2/2025 with MD Lilly Mckeon, PharmD, [facility]  Rheumatology Clinical Pharmacist  [facility]

## 2025-06-12 NOTE — PROGRESS NOTES
This is an established patient that has been following with me. I confirm the findings and recommendations made by the ambulatory clinical pharmacist. I also created the plan of care and discussed the treatment plan with the ambulatory clinical pharmacist. I agree with the proposed management plan as documented in her note       Lindsay Logan M.D.  Rheumatology Dept  [place], [place]

## 2025-06-20 ENCOUNTER — ANESTHESIA EVENT (OUTPATIENT)
Dept: ENDOSCOPY | Facility: HOSPITAL | Age: 50
End: 2025-06-20
Payer: COMMERCIAL

## 2025-06-20 ENCOUNTER — HOSPITAL ENCOUNTER (OUTPATIENT)
Facility: HOSPITAL | Age: 50
Discharge: HOME | End: 2025-06-20
Attending: INTERNAL MEDICINE | Admitting: INTERNAL MEDICINE
Payer: COMMERCIAL

## 2025-06-20 ENCOUNTER — ANESTHESIA (OUTPATIENT)
Dept: ENDOSCOPY | Facility: HOSPITAL | Age: 50
End: 2025-06-20
Payer: COMMERCIAL

## 2025-06-20 DIAGNOSIS — Z12.11 COLON CANCER SCREENING: ICD-10-CM

## 2025-06-20 LAB
B-HCG UR QL: NEGATIVE
CTP QC/QA: YES

## 2025-06-20 PROCEDURE — 45380 COLONOSCOPY AND BIOPSY: CPT | Mod: 33 | Performed by: INTERNAL MEDICINE

## 2025-06-20 PROCEDURE — 81025 URINE PREGNANCY TEST: CPT | Performed by: INTERNAL MEDICINE

## 2025-06-20 PROCEDURE — 37000009 HC ANESTHESIA EA ADD 15 MINS: Performed by: INTERNAL MEDICINE

## 2025-06-20 PROCEDURE — 45380 COLONOSCOPY AND BIOPSY: CPT | Mod: 33,,, | Performed by: INTERNAL MEDICINE

## 2025-06-20 PROCEDURE — 63600175 PHARM REV CODE 636 W HCPCS: Performed by: NURSE ANESTHETIST, CERTIFIED REGISTERED

## 2025-06-20 PROCEDURE — 27201012 HC FORCEPS, HOT/COLD, DISP: Performed by: INTERNAL MEDICINE

## 2025-06-20 PROCEDURE — 37000008 HC ANESTHESIA 1ST 15 MINUTES: Performed by: INTERNAL MEDICINE

## 2025-06-20 PROCEDURE — 25000003 PHARM REV CODE 250: Performed by: INTERNAL MEDICINE

## 2025-06-20 RX ORDER — SODIUM CHLORIDE 9 MG/ML
INJECTION, SOLUTION INTRAVENOUS CONTINUOUS
Status: DISCONTINUED | OUTPATIENT
Start: 2025-06-20 | End: 2025-06-20 | Stop reason: HOSPADM

## 2025-06-20 RX ORDER — PROPOFOL 10 MG/ML
VIAL (ML) INTRAVENOUS
Status: DISCONTINUED | OUTPATIENT
Start: 2025-06-20 | End: 2025-06-20

## 2025-06-20 RX ORDER — LIDOCAINE HYDROCHLORIDE 20 MG/ML
INJECTION INTRAVENOUS
Status: DISCONTINUED | OUTPATIENT
Start: 2025-06-20 | End: 2025-06-20

## 2025-06-20 RX ORDER — DEXTROMETHORPHAN/PSEUDOEPHED 2.5-7.5/.8
DROPS ORAL
Status: COMPLETED | OUTPATIENT
Start: 2025-06-20 | End: 2025-06-20

## 2025-06-20 RX ADMIN — PROPOFOL 50 MG: 10 INJECTION, EMULSION INTRAVENOUS at 08:06

## 2025-06-20 RX ADMIN — SODIUM CHLORIDE: 9 INJECTION, SOLUTION INTRAVENOUS at 08:06

## 2025-06-20 RX ADMIN — LIDOCAINE HYDROCHLORIDE 50 MG: 20 INJECTION, SOLUTION INTRAVENOUS at 08:06

## 2025-06-20 NOTE — TRANSFER OF CARE
"Anesthesia Transfer of Care Note    Patient: Preeti Garvey    Procedure(s) Performed: Procedure(s) (LRB):  COLONOSCOPY, SCREENING, LOW RISK PATIENT (N/A)    Patient location: GI    Anesthesia Type: general    Transport from OR: Transported from OR on room air with adequate spontaneous ventilation    Post pain: adequate analgesia    Post assessment: no apparent anesthetic complications    Post vital signs: stable    Level of consciousness: sedated    Nausea/Vomiting: no nausea/vomiting    Complications: none    Transfer of care protocol was followed      Last vitals: Visit Vitals  /66 (BP Location: Left arm, Patient Position: Lying)   Pulse 83   Temp 36.7 °C (98.1 °F) (Skin)   Resp 16   Ht 5' 4" (1.626 m)   Wt 64.4 kg (142 lb)   LMP 06/20/2025   SpO2 98%   Breastfeeding No   BMI 24.37 kg/m²     "

## 2025-06-20 NOTE — H&P
"[facility]    CC: Screening colonoscopy    HPI 50 y.o. female presents for screening colonoscopy    Past Medical History:   Diagnosis Date    Iron deficiency anemia, unspecified     Psoriatic arthritis        Allergies and Medications reviewed     Review of Systems  General ROS: negative for - chills, fever or weight loss  Cardiovascular ROS: no chest pain or dyspnea on exertion  Gastrointestinal ROS: no blood in stool    Physical Examination  /66 (BP Location: Left arm, Patient Position: Lying)   Pulse 83   Temp 98.1 °F (36.7 °C) (Skin)   Resp 16   Ht 5' 4" (1.626 m)   Wt 64.4 kg (142 lb)   LMP 06/20/2025   SpO2 98%   Breastfeeding No   BMI 24.37 kg/m²   General appearance: alert, cooperative, no distress  HENT: Normocephalic, atraumatic, neck symmetrical, no nasal discharge, sclera anicteric   Lungs: clear to auscultation bilaterally, symmetric chest wall expansion bilaterally  Heart: regular rate and rhythm without rub; no displacement of the PMI   Abdomen: soft  Extremities: extremities symmetric; no clubbing, cyanosis, or edema        Labs:  Lab Results   Component Value Date    WBC 5.58 04/16/2025    HGB 12.8 04/16/2025    HCT 42.3 04/16/2025    MCV 83 04/16/2025     04/16/2025       Assessment:   50 y.o. female presents for routine screening colonoscopy    Plan:  PRoceed to colonoscopy     Sulema Purcell MD  [facility]  [address], [address]  [place] [address]  Office: [phone]  Fax: [phone]   "

## 2025-06-20 NOTE — [employer]ATION PATIENT INSTRUCTIONS
Discharge Summary/Instructions after an Endoscopic Procedure  Patient Name: Preeti Young  Patient MRN: [id number]  Patient YOB: 1975 Friday, June 20, 2025  Sulema Purcell MD  Dear patient,  As a result of recent federal legislation (The Federal Cures Act), you may   receive lab or pathology results from your procedure in your [facility]   account before your physician is able to contact you. Your physician or   their representative will relay the results to you with their   recommendations at their soonest availability.  Thank you,  RESTRICTIONS:  During your procedure today, you received medications for sedation.  These   medications may affect your judgment, balance and coordination.  Therefore,   for 24 hours, you have the following restrictions:   - DO NOT drive a car, operate machinery, make legal/financial decisions,   sign important papers or drink alcohol.    ACTIVITY:  Today: no heavy lifting, straining or running due to procedural   sedation/anesthesia.  The following day: return to full activity including work.  DIET:  Eat and drink normally unless instructed otherwise.     TREATMENT FOR COMMON SIDE EFFECTS:  - Mild abdominal pain, nausea, belching, bloating or excessive gas:  rest,   eat lightly and use a heating pad.  - Sore Throat: treat with throat lozenges and/or gargle with warm salt   water.  - Because air was used during the procedure, expelling large amounts of air   from your rectum or belching is normal.  - If a bowel prep was taken, you may not have a bowel movement for 1-3 days.    This is normal.  SYMPTOMS TO WATCH FOR AND REPORT TO YOUR PHYSICIAN:  1. Abdominal pain or bloating, other than gas cramps.  2. Chest pain.  3. Back pain.  4. Signs of infection such as: chills or fever occurring within 24 hours   after the procedure.  5. Rectal bleeding, which would show as bright red, maroon, or black stools.   (A tablespoon of blood from the rectum is not serious,  especially if   hemorrhoids are present.)  6. Vomiting.  7. Weakness or dizziness.  GO DIRECTLY TO THE NEAREST EMERGENCY ROOM IF YOU HAVE ANY OF THE FOLLOWING:      Difficulty breathing              Chills and/or fever over 101 F   Persistent vomiting and/or vomiting blood   Severe abdominal pain   Severe chest pain   Black, tarry stools   Bleeding- more than one tablespoon   Any other symptom or condition that you feel may need urgent attention  Your doctor recommends these additional instructions:  If any biopsies were taken, your doctors clinic will contact you in 1 to 2   weeks with any results.  - Discharge patient to home (with escort).   - Patient has a contact number available for emergencies.  The signs and   symptoms of potential delayed complications were discussed with the   patient.  Return to normal activities tomorrow.  Written discharge   instructions were provided to the patient.   - Resume previous diet.   - Continue present medications.   - Await pathology results.   - Repeat colonoscopy in 5-10 years for surveillance based on pathology   results.   - Return to my office PRN.  For questions, problems or results please call your physician - Sulema Purcell MD at Work:  [phone].  [facility], EMERGENCY ROOM PHONE NUMBER: [phone]  IF A COMPLICATION OR EMERGENCY SITUATION ARISES AND YOU ARE UNABLE TO REACH   YOUR PHYSICIAN - GO DIRECTLY TO THE EMERGENCY ROOM.  Sulema Purcell MD  6/20/2025 8:56:06 AM  This report has been verified and signed electronically.  Dear patient,  As a result of recent federal legislation (The Federal Cures Act), you may   receive lab or pathology results from your procedure in your [facility]   account before your physician is able to contact you. Your physician or   their representative will relay the results to you with their   recommendations at their soonest availability.  Thank you,  [employer]ATION

## 2025-06-20 NOTE — ANESTHESIA POSTPROCEDURE EVALUATION
Anesthesia Post Evaluation    Patient: Preeti Garvey    Procedure(s) Performed: Procedure(s) (LRB):  COLONOSCOPY, SCREENING, LOW RISK PATIENT (N/A)    Final Anesthesia Type: general      Patient location during evaluation: PACU  Patient participation: Yes- Able to Participate  Level of consciousness: awake  Post-procedure vital signs: reviewed and stable  Pain management: adequate  Airway patency: patent    PONV status at discharge: No PONV  Anesthetic complications: no      Cardiovascular status: blood pressure returned to baseline  Respiratory status: unassisted  Hydration status: euvolemic  Follow-up not needed.              Vitals Value Taken Time   /64 06/20/25 09:17   Temp 36.6 °C (97.9 °F) 06/20/25 08:54   Pulse 73 06/20/25 09:17   Resp 16 06/20/25 09:17   SpO2 100 % 06/20/25 09:17         Event Time   Out of Recovery 09:32:55         Pain/Greg Score: Greg Score: 10 (6/20/2025  9:17 AM)

## 2025-06-20 NOTE — ANESTHESIA PREPROCEDURE EVALUATION
06/20/2025  Preeti Garvey is a 50 y.o., female.      Pre-op Assessment    I have reviewed the Patient Summary Reports.    I have reviewed the NPO Status.   I have reviewed the Medications.     Review of Systems  Anesthesia Hx:  No problems with previous Anesthesia                Hematology/Oncology:       -- Anemia:                                  EENT/Dental:  chronic allergic rhinitis           Cardiovascular:  Cardiovascular Normal                                              Pulmonary:  Pulmonary Normal                       Musculoskeletal:  Arthritis               Neurological:  Neurology Normal                                          Physical Exam  General: Well nourished    Airway:  Mallampati: II   Mouth Opening: Normal  TM Distance: Normal  Neck ROM: Normal ROM        Anesthesia Plan  Type of Anesthesia, risks & benefits discussed:    Anesthesia Type: Gen Natural Airway  Intra-op Monitoring Plan: Standard ASA Monitors  Induction:  IV  Informed Consent: Informed consent signed with the Patient and all parties understand the risks and agree with anesthesia plan.  All questions answered.   ASA Score: 2    Ready For Surgery From Anesthesia Perspective.     .

## 2025-06-20 NOTE — PLAN OF CARE
Vss, wendy po fluids, denies pain, ambulates easily. IV removed, catheter intact. Discharge instructions provided and states understanding. States ready to go home.  Discharged from facility with family per wheelchair.

## 2025-06-23 VITALS
WEIGHT: 142 LBS | SYSTOLIC BLOOD PRESSURE: 111 MMHG | HEART RATE: 73 BPM | OXYGEN SATURATION: 100 % | HEIGHT: 64 IN | BODY MASS INDEX: 24.24 KG/M2 | TEMPERATURE: 98 F | RESPIRATION RATE: 16 BRPM | DIASTOLIC BLOOD PRESSURE: 64 MMHG

## 2025-06-26 ENCOUNTER — RESULTS FOLLOW-UP (OUTPATIENT)
Dept: GASTROENTEROLOGY | Facility: HOSPITAL | Age: 50
End: 2025-06-26

## 2025-06-27 ENCOUNTER — LAB VISIT (OUTPATIENT)
Dept: LAB | Facility: HOSPITAL | Age: 50
End: 2025-06-27
Attending: STUDENT IN AN ORGANIZED HEALTH CARE EDUCATION/TRAINING PROGRAM
Payer: COMMERCIAL

## 2025-06-27 DIAGNOSIS — Z51.81 MEDICATION MONITORING ENCOUNTER: ICD-10-CM

## 2025-06-27 DIAGNOSIS — L40.50 PSORIATIC ARTHRITIS: ICD-10-CM

## 2025-06-27 DIAGNOSIS — Z79.1 NSAID LONG-TERM USE: ICD-10-CM

## 2025-06-27 DIAGNOSIS — L40.9 PSORIASIS: ICD-10-CM

## 2025-06-27 LAB
ABSOLUTE EOSINOPHIL (OHS): 0.24 K/UL
ABSOLUTE MONOCYTE (OHS): 0.85 K/UL (ref 0.3–1)
ABSOLUTE NEUTROPHIL COUNT (OHS): 3.89 K/UL (ref 1.8–7.7)
ALT SERPL-CCNC: 13 UNIT/L (ref 10–44)
AST SERPL-CCNC: 15 UNIT/L (ref 11–45)
BASOPHILS # BLD AUTO: 0.06 K/UL
BASOPHILS NFR BLD AUTO: 0.8 %
CREAT SERPL-MCNC: 0.7 MG/DL (ref 0.5–1.4)
CRP SERPL-MCNC: 1.4 MG/L
ERYTHROCYTE [DISTWIDTH] IN BLOOD BY AUTOMATED COUNT: 14.3 % (ref 11.5–14.5)
ERYTHROCYTE [SEDIMENTATION RATE] IN BLOOD BY PHOTOMETRIC METHOD: 26 MM/HR
GFR SERPLBLD CREATININE-BSD FMLA CKD-EPI: >60 ML/MIN/1.73/M2
HCT VFR BLD AUTO: 41 % (ref 37–48.5)
HGB BLD-MCNC: 13.1 GM/DL (ref 12–16)
IMM GRANULOCYTES # BLD AUTO: 0.02 K/UL (ref 0–0.04)
IMM GRANULOCYTES NFR BLD AUTO: 0.3 % (ref 0–0.5)
LYMPHOCYTES # BLD AUTO: 2.64 K/UL (ref 1–4.8)
MCH RBC QN AUTO: 28.3 PG (ref 27–31)
MCHC RBC AUTO-ENTMCNC: 32 G/DL (ref 32–36)
MCV RBC AUTO: 89 FL (ref 82–98)
NUCLEATED RBC (/100WBC) (OHS): 0 /100 WBC
PLATELET # BLD AUTO: 367 K/UL (ref 150–450)
PMV BLD AUTO: 9.6 FL (ref 9.2–12.9)
RBC # BLD AUTO: 4.63 M/UL (ref 4–5.4)
RELATIVE EOSINOPHIL (OHS): 3.1 %
RELATIVE LYMPHOCYTE (OHS): 34.3 % (ref 18–48)
RELATIVE MONOCYTE (OHS): 11 % (ref 4–15)
RELATIVE NEUTROPHIL (OHS): 50.5 % (ref 38–73)
WBC # BLD AUTO: 7.7 K/UL (ref 3.9–12.7)

## 2025-06-27 PROCEDURE — 84450 TRANSFERASE (AST) (SGOT): CPT

## 2025-06-27 PROCEDURE — 85025 COMPLETE CBC W/AUTO DIFF WBC: CPT

## 2025-06-27 PROCEDURE — 86140 C-REACTIVE PROTEIN: CPT

## 2025-06-27 PROCEDURE — 84460 ALANINE AMINO (ALT) (SGPT): CPT | Performed by: STUDENT IN AN ORGANIZED HEALTH CARE EDUCATION/TRAINING PROGRAM

## 2025-06-27 PROCEDURE — 36415 COLL VENOUS BLD VENIPUNCTURE: CPT | Mod: PO

## 2025-06-27 PROCEDURE — 82565 ASSAY OF CREATININE: CPT | Performed by: STUDENT IN AN ORGANIZED HEALTH CARE EDUCATION/TRAINING PROGRAM

## 2025-06-27 PROCEDURE — 85652 RBC SED RATE AUTOMATED: CPT

## 2025-06-29 ENCOUNTER — RESULTS FOLLOW-UP (OUTPATIENT)
Dept: RHEUMATOLOGY | Facility: CLINIC | Age: 50
End: 2025-06-29

## 2025-07-01 NOTE — PROGRESS NOTES
Subjective:      Patient ID: Preeti Garvey is a 50 y.o. female.    Chief Complaint: Disease Management    HPI    Rheumatologic History:      - Diagnosis/es:              - Psoriasis (patient states biopsy proven) diagnosed around               - Psoriatic arthritis diagnosed 24 on the basis of personal history of psoriasis, inflammatory arthritis, dactylitis, enthesitis. No uveitis, inflammatory back pain, GI symptoms.   - Family History: No autoimmune conditions  - Gyn History:  (1001); no VTE  - Social History: never smoker, rare alcohol intake   - Positive serologies: DAIJA 1:320 homogenous  - Negative serologies: dsDNA, SSA, SSB, Sm, RNP, RF  - Infectious screening labs: Negative HIV, hepatitis B and C (3/21/25), quantiferon not drawn  - Imaging:              - Xray right foot (24) No fracture or dislocation. No significant degenerative change. The soft tissues are unremarkable.   - Previous Treatments:              - prednisone 10mg:effective              - meloxicam: ineffective              - Otezla 30mg BID (-2024)   - Current Treatments:               - Prednisone 2.5mg for 2 weeks then stop  - Humira biweekly (2025- )  - Family Planning: Still menstruating, but on Provera. No plans for pregnancy.   Initial History:   In 2024, she developed pain and swelling in her knees, antalgic gait requiring a walker, and pain and swelling in the 2nd digit of the right hand. Her PCP gave her a round of steroids that made the joint pain and swelling resolve completely. She still reports some pain and swelling in her right 2nd digit. She does report a history of plantar fasciitis and capsulitis in the right foot. The Otezla is working for her skin, but she still has residual pain and swelling in her knees and left right digit. Physical examination shows swelling in the knees and dactylitis of the right 2nd digit. I suspect psoriatic arthritis. We discussed potential treatment options  "including increasing the dose of ibuprofen for a month and monitoring response, methotrexate low dose, and Humira. She prefers to try ibuprofen first then if she fails will consider MTX. She is hesitant about injectables.      Interval History:   She reports significant improvement in her hand and foot pain on the Humira and denies medication adverse effects.    Objective:   /75 (BP Location: Right arm, Patient Position: Sitting)   Pulse 87   Ht 5' 4" (1.626 m)   Wt 65 kg (143 lb 4.8 oz)   LMP 06/20/2025   BMI 24.60 kg/m²   Physical Exam   Constitutional: normal appearance.   HENT:   Head: Normocephalic and atraumatic.   Cardiovascular: Normal rate, regular rhythm and normal heart sounds.   Pulmonary/Chest: Effort normal and breath sounds normal.   Musculoskeletal:      Comments: Cool effusions, both knees     Neurological: She is alert.   Skin: Skin is warm and dry. Rash noted.   Psoriatic plaques right elbow       No data to display    Labs (6/27/25)   CBC WNL   CR, AST, ALT WNL  ESR CRP WNL     Assessment:     1. Psoriatic arthritis    2. Psoriasis    3. Drug-induced immunodeficiency    4. High risk medications (not anticoagulants) long-term use        This is a 50 year old woman with allergic rhinitis, ROBERT, vitamin D deficiency,  psoriasis  and psoriatic arthritis on Humira biweekly and prednisone 5mg daily. She reports significant improvement in her hand and foot pain on the Humira and denies medication adverse effects. Decrease prednisone to 2.5mg for 2 weeks then stop.    Plan:     Problem List Items Addressed This Visit          Derm    Psoriasis    Relevant Orders    CBC Auto Differential    Sedimentation rate    C-Reactive Protein    Creatinine, Serum    AST (SGOT)    ALT (SGPT)       Immunology/Multi System    Drug-induced immunodeficiency    Relevant Orders    CBC Auto Differential    Sedimentation rate    C-Reactive Protein    Creatinine, Serum    AST (SGOT)    ALT (SGPT)       Orthopedic    " Psoriatic arthritis - Primary    Relevant Orders    CBC Auto Differential    Sedimentation rate    C-Reactive Protein    Creatinine, Serum    AST (SGOT)    ALT (SGPT)     Other Visit Diagnoses         High risk medications (not anticoagulants) long-term use        Relevant Orders    CBC Auto Differential    Sedimentation rate    C-Reactive Protein    Creatinine, Serum    AST (SGOT)    ALT (SGPT)          1.) Psoriasis  2.) Psoriatic arthritis   3.) Long term NSAID use  4.) Drug induced immunodeficiency  5.) High risk medication use  - Humira biweekly  - Prednisone 2.5mg daily for 2 weeks then stop  - CBC, CMP, ESR, CRP before follow up  - Pre-DMARD labs: quantiferon  - Immunizations: I recommended HD flu, PCV20, ad Shingrix in anticipation of potential DMARD therapy    Follow up in 3 months    40 minutes of total time spent on the encounter, which includes face to face time and non-face to face time preparing to see the patient (eg, review of tests), Obtaining and/or reviewing separately obtained history, Documenting clinical information in the electronic or other health record, Independently interpreting results (not separately reported) and communicating results to the patient/family/caregiver, or Care coordination (not separately reported).     This note was prepared with [employer] voice recognition transcription software. Garbled syntax, mangled pronouns, and other bizarre constructions may be attributed to that software system       Lindsay Logan M.D.  Rheumatology Dept  [place], [place]

## 2025-07-02 ENCOUNTER — OFFICE VISIT (OUTPATIENT)
Dept: RHEUMATOLOGY | Facility: CLINIC | Age: 50
End: 2025-07-02
Payer: COMMERCIAL

## 2025-07-02 VITALS
DIASTOLIC BLOOD PRESSURE: 75 MMHG | SYSTOLIC BLOOD PRESSURE: 135 MMHG | HEART RATE: 87 BPM | HEIGHT: 64 IN | WEIGHT: 143.31 LBS | BODY MASS INDEX: 24.46 KG/M2

## 2025-07-02 DIAGNOSIS — Z79.899 HIGH RISK MEDICATIONS (NOT ANTICOAGULANTS) LONG-TERM USE: ICD-10-CM

## 2025-07-02 DIAGNOSIS — D84.821 DRUG-INDUCED IMMUNODEFICIENCY: ICD-10-CM

## 2025-07-02 DIAGNOSIS — Z79.899 DRUG-INDUCED IMMUNODEFICIENCY: ICD-10-CM

## 2025-07-02 DIAGNOSIS — L40.9 PSORIASIS: ICD-10-CM

## 2025-07-02 DIAGNOSIS — L40.50 PSORIATIC ARTHRITIS: Primary | ICD-10-CM

## 2025-07-02 PROCEDURE — 1160F RVW MEDS BY RX/DR IN RCRD: CPT | Mod: CPTII,S$GLB,, | Performed by: STUDENT IN AN ORGANIZED HEALTH CARE EDUCATION/TRAINING PROGRAM

## 2025-07-02 PROCEDURE — 1159F MED LIST DOCD IN RCRD: CPT | Mod: CPTII,S$GLB,, | Performed by: STUDENT IN AN ORGANIZED HEALTH CARE EDUCATION/TRAINING PROGRAM

## 2025-07-02 PROCEDURE — 3008F BODY MASS INDEX DOCD: CPT | Mod: CPTII,S$GLB,, | Performed by: STUDENT IN AN ORGANIZED HEALTH CARE EDUCATION/TRAINING PROGRAM

## 2025-07-02 PROCEDURE — 3075F SYST BP GE 130 - 139MM HG: CPT | Mod: CPTII,S$GLB,, | Performed by: STUDENT IN AN ORGANIZED HEALTH CARE EDUCATION/TRAINING PROGRAM

## 2025-07-02 PROCEDURE — 99999 PR PBB SHADOW E&M-EST. PATIENT-LVL III: CPT | Mod: PBBFAC,,, | Performed by: STUDENT IN AN ORGANIZED HEALTH CARE EDUCATION/TRAINING PROGRAM

## 2025-07-02 PROCEDURE — 3044F HG A1C LEVEL LT 7.0%: CPT | Mod: CPTII,S$GLB,, | Performed by: STUDENT IN AN ORGANIZED HEALTH CARE EDUCATION/TRAINING PROGRAM

## 2025-07-02 PROCEDURE — 3078F DIAST BP <80 MM HG: CPT | Mod: CPTII,S$GLB,, | Performed by: STUDENT IN AN ORGANIZED HEALTH CARE EDUCATION/TRAINING PROGRAM

## 2025-07-02 PROCEDURE — 99215 OFFICE O/P EST HI 40 MIN: CPT | Mod: S$GLB,,, | Performed by: STUDENT IN AN ORGANIZED HEALTH CARE EDUCATION/TRAINING PROGRAM

## 2025-08-04 ENCOUNTER — PATIENT MESSAGE (OUTPATIENT)
Facility: CLINIC | Age: 50
End: 2025-08-04
Payer: COMMERCIAL

## 2025-08-11 ENCOUNTER — PATIENT MESSAGE (OUTPATIENT)
Dept: RHEUMATOLOGY | Facility: CLINIC | Age: 50
End: 2025-08-11
Payer: COMMERCIAL

## 2025-08-11 DIAGNOSIS — L40.50 PSORIATIC ARTHRITIS: ICD-10-CM

## 2025-08-14 ENCOUNTER — PATIENT MESSAGE (OUTPATIENT)
Dept: FAMILY MEDICINE | Facility: CLINIC | Age: 50
End: 2025-08-14
Payer: COMMERCIAL

## 2025-08-14 DIAGNOSIS — L40.50 PSORIATIC ARTHRITIS: Primary | ICD-10-CM

## 2025-08-14 RX ORDER — PREDNISONE 5 MG/1
2.5 TABLET ORAL DAILY
Qty: 90 TABLET | Refills: 1 | OUTPATIENT
Start: 2025-08-14 | End: 2026-08-14

## 2025-08-14 RX ORDER — PREDNISONE 2.5 MG/1
2.5 TABLET ORAL DAILY
Qty: 90 TABLET | Refills: 3 | Status: SHIPPED | OUTPATIENT
Start: 2025-08-14 | End: 2026-08-14

## 2025-08-15 ENCOUNTER — LAB VISIT (OUTPATIENT)
Dept: LAB | Facility: HOSPITAL | Age: 50
End: 2025-08-15
Attending: FAMILY MEDICINE
Payer: COMMERCIAL

## 2025-08-15 DIAGNOSIS — E55.9 VITAMIN D DEFICIENCY: ICD-10-CM

## 2025-08-15 DIAGNOSIS — D50.9 IRON DEFICIENCY ANEMIA, UNSPECIFIED IRON DEFICIENCY ANEMIA TYPE: ICD-10-CM

## 2025-08-15 DIAGNOSIS — Z00.00 ANNUAL PHYSICAL EXAM: ICD-10-CM

## 2025-08-15 DIAGNOSIS — R73.9 HYPERGLYCEMIA: ICD-10-CM

## 2025-08-15 LAB
25(OH)D3+25(OH)D2 SERPL-MCNC: 34 NG/ML (ref 30–96)
ABSOLUTE EOSINOPHIL (OHS): 0.36 K/UL
ABSOLUTE MONOCYTE (OHS): 0.45 K/UL (ref 0.3–1)
ABSOLUTE NEUTROPHIL COUNT (OHS): 1.68 K/UL (ref 1.8–7.7)
ALBUMIN SERPL BCP-MCNC: 3.8 G/DL (ref 3.5–5.2)
ALP SERPL-CCNC: 65 UNIT/L (ref 40–150)
ALT SERPL W/O P-5'-P-CCNC: 14 UNIT/L (ref 0–55)
ANION GAP (OHS): 7 MMOL/L (ref 8–16)
AST SERPL-CCNC: 21 UNIT/L (ref 0–50)
BASOPHILS # BLD AUTO: 0.06 K/UL
BASOPHILS NFR BLD AUTO: 1.2 %
BILIRUB SERPL-MCNC: 0.3 MG/DL (ref 0.1–1)
BUN SERPL-MCNC: 11 MG/DL (ref 6–20)
CALCIUM SERPL-MCNC: 8.9 MG/DL (ref 8.7–10.5)
CHLORIDE SERPL-SCNC: 108 MMOL/L (ref 95–110)
CHOLEST SERPL-MCNC: 167 MG/DL (ref 120–199)
CHOLEST/HDLC SERPL: 4 {RATIO} (ref 2–5)
CO2 SERPL-SCNC: 24 MMOL/L (ref 23–29)
CREAT SERPL-MCNC: 0.7 MG/DL (ref 0.5–1.4)
EAG (OHS): 111 MG/DL (ref 68–131)
ERYTHROCYTE [DISTWIDTH] IN BLOOD BY AUTOMATED COUNT: 11.9 % (ref 11.5–14.5)
FERRITIN SERPL-MCNC: 28 NG/ML (ref 20–300)
GFR SERPLBLD CREATININE-BSD FMLA CKD-EPI: >60 ML/MIN/1.73/M2
GLUCOSE SERPL-MCNC: 101 MG/DL (ref 70–110)
HBA1C MFR BLD: 5.5 % (ref 4–5.6)
HCT VFR BLD AUTO: 43.2 % (ref 37–48.5)
HDLC SERPL-MCNC: 42 MG/DL (ref 40–75)
HDLC SERPL: 25.1 % (ref 20–50)
HGB BLD-MCNC: 13.8 GM/DL (ref 12–16)
IMM GRANULOCYTES # BLD AUTO: 0.01 K/UL (ref 0–0.04)
IMM GRANULOCYTES NFR BLD AUTO: 0.2 % (ref 0–0.5)
IRON SATN MFR SERPL: 21 % (ref 20–50)
IRON SERPL-MCNC: 80 UG/DL (ref 30–160)
LDLC SERPL CALC-MCNC: 109.6 MG/DL (ref 63–159)
LYMPHOCYTES # BLD AUTO: 2.33 K/UL (ref 1–4.8)
MCH RBC QN AUTO: 29 PG (ref 27–31)
MCHC RBC AUTO-ENTMCNC: 31.9 G/DL (ref 32–36)
MCV RBC AUTO: 91 FL (ref 82–98)
NONHDLC SERPL-MCNC: 125 MG/DL
NUCLEATED RBC (/100WBC) (OHS): 0 /100 WBC
PLATELET # BLD AUTO: 349 K/UL (ref 150–450)
PMV BLD AUTO: 9.4 FL (ref 9.2–12.9)
POTASSIUM SERPL-SCNC: 3.7 MMOL/L (ref 3.5–5.1)
PROT SERPL-MCNC: 7.2 GM/DL (ref 6–8.4)
RBC # BLD AUTO: 4.76 M/UL (ref 4–5.4)
RELATIVE EOSINOPHIL (OHS): 7.4 %
RELATIVE LYMPHOCYTE (OHS): 47.6 % (ref 18–48)
RELATIVE MONOCYTE (OHS): 9.2 % (ref 4–15)
RELATIVE NEUTROPHIL (OHS): 34.4 % (ref 38–73)
SODIUM SERPL-SCNC: 139 MMOL/L (ref 136–145)
TIBC SERPL-MCNC: 380 UG/DL (ref 250–450)
TRANSFERRIN SERPL-MCNC: 257 MG/DL (ref 200–375)
TRIGL SERPL-MCNC: 77 MG/DL (ref 30–150)
WBC # BLD AUTO: 4.89 K/UL (ref 3.9–12.7)

## 2025-08-15 PROCEDURE — 80061 LIPID PANEL: CPT

## 2025-08-15 PROCEDURE — 82306 VITAMIN D 25 HYDROXY: CPT

## 2025-08-15 PROCEDURE — 82040 ASSAY OF SERUM ALBUMIN: CPT

## 2025-08-15 PROCEDURE — 84466 ASSAY OF TRANSFERRIN: CPT

## 2025-08-15 PROCEDURE — 83036 HEMOGLOBIN GLYCOSYLATED A1C: CPT

## 2025-08-15 PROCEDURE — 36415 COLL VENOUS BLD VENIPUNCTURE: CPT | Mod: PO

## 2025-08-15 PROCEDURE — 85025 COMPLETE CBC W/AUTO DIFF WBC: CPT

## 2025-08-15 PROCEDURE — 82728 ASSAY OF FERRITIN: CPT

## 2025-08-20 ENCOUNTER — TELEPHONE (OUTPATIENT)
Dept: FAMILY MEDICINE | Facility: CLINIC | Age: 50
End: 2025-08-20
Payer: COMMERCIAL

## 2025-08-24 ENCOUNTER — PATIENT MESSAGE (OUTPATIENT)
Dept: RHEUMATOLOGY | Facility: CLINIC | Age: 50
End: 2025-08-24
Payer: COMMERCIAL

## 2025-08-27 ENCOUNTER — OFFICE VISIT (OUTPATIENT)
Dept: FAMILY MEDICINE | Facility: CLINIC | Age: 50
End: 2025-08-27
Payer: COMMERCIAL

## 2025-08-27 DIAGNOSIS — E55.9 VITAMIN D DEFICIENCY: ICD-10-CM

## 2025-08-27 DIAGNOSIS — R73.9 HYPERGLYCEMIA: ICD-10-CM

## 2025-08-27 DIAGNOSIS — N93.8 DUB (DYSFUNCTIONAL UTERINE BLEEDING): ICD-10-CM

## 2025-08-27 DIAGNOSIS — L40.9 PSORIASIS: ICD-10-CM

## 2025-08-27 DIAGNOSIS — Z00.00 ANNUAL PHYSICAL EXAM: ICD-10-CM

## 2025-08-27 DIAGNOSIS — Z79.899 DRUG-INDUCED IMMUNODEFICIENCY: ICD-10-CM

## 2025-08-27 DIAGNOSIS — D84.821 DRUG-INDUCED IMMUNODEFICIENCY: ICD-10-CM

## 2025-08-27 DIAGNOSIS — L40.50 PSORIATIC ARTHRITIS: ICD-10-CM

## 2025-08-27 DIAGNOSIS — D50.9 IRON DEFICIENCY ANEMIA, UNSPECIFIED IRON DEFICIENCY ANEMIA TYPE: Primary | ICD-10-CM
